# Patient Record
Sex: FEMALE | Race: WHITE | NOT HISPANIC OR LATINO | Employment: OTHER | URBAN - METROPOLITAN AREA
[De-identification: names, ages, dates, MRNs, and addresses within clinical notes are randomized per-mention and may not be internally consistent; named-entity substitution may affect disease eponyms.]

---

## 2017-01-24 ENCOUNTER — HOSPITAL ENCOUNTER (OUTPATIENT)
Dept: CT IMAGING | Facility: CLINIC | Age: 62
Discharge: HOME | End: 2017-01-24
Attending: NURSE PRACTITIONER | Admitting: NURSE PRACTITIONER
Payer: COMMERCIAL

## 2017-01-24 ENCOUNTER — CHARTING TRANS (OUTPATIENT)
Dept: OTHER | Age: 62
End: 2017-01-24

## 2017-01-24 ENCOUNTER — OFFICE VISIT (OUTPATIENT)
Dept: FAMILY MEDICINE | Facility: CLINIC | Age: 62
End: 2017-01-24
Payer: COMMERCIAL

## 2017-01-24 VITALS
SYSTOLIC BLOOD PRESSURE: 126 MMHG | HEART RATE: 80 BPM | TEMPERATURE: 98.7 F | DIASTOLIC BLOOD PRESSURE: 80 MMHG | WEIGHT: 286 LBS | BODY MASS INDEX: 38.78 KG/M2

## 2017-01-24 DIAGNOSIS — R10.84 ABDOMINAL PAIN, GENERALIZED: ICD-10-CM

## 2017-01-24 DIAGNOSIS — K57.32 DIVERTICULITIS OF COLON: Primary | ICD-10-CM

## 2017-01-24 LAB
ALBUMIN SERPL-MCNC: 3.7 G/DL (ref 3.4–5)
ALBUMIN UR-MCNC: ABNORMAL MG/DL
ALP SERPL-CCNC: 89 U/L (ref 40–150)
ALT SERPL W P-5'-P-CCNC: 25 U/L (ref 0–50)
ANION GAP SERPL CALCULATED.3IONS-SCNC: 8 MMOL/L (ref 3–14)
APPEARANCE UR: CLEAR
AST SERPL W P-5'-P-CCNC: 16 U/L (ref 0–45)
BACTERIA #/AREA URNS HPF: ABNORMAL /HPF
BILIRUB SERPL-MCNC: 0.4 MG/DL (ref 0.2–1.3)
BILIRUB UR QL STRIP: NEGATIVE
BUN SERPL-MCNC: 11 MG/DL (ref 7–30)
CALCIUM SERPL-MCNC: 8.9 MG/DL (ref 8.5–10.1)
CHLORIDE SERPL-SCNC: 105 MMOL/L (ref 94–109)
CO2 SERPL-SCNC: 29 MMOL/L (ref 20–32)
COLOR UR AUTO: YELLOW
CREAT SERPL-MCNC: 0.86 MG/DL (ref 0.52–1.04)
CRP SERPL-MCNC: 70.2 MG/L (ref 0–8)
ERYTHROCYTE [DISTWIDTH] IN BLOOD BY AUTOMATED COUNT: 13.4 % (ref 10–15)
GFR SERPL CREATININE-BSD FRML MDRD: 67 ML/MIN/1.7M2
GLUCOSE SERPL-MCNC: 91 MG/DL (ref 70–99)
GLUCOSE UR STRIP-MCNC: NEGATIVE MG/DL
HCT VFR BLD AUTO: 42.2 % (ref 35–47)
HGB BLD-MCNC: 14.1 G/DL (ref 11.7–15.7)
HGB UR QL STRIP: ABNORMAL
KETONES UR STRIP-MCNC: NEGATIVE MG/DL
LEUKOCYTE ESTERASE UR QL STRIP: ABNORMAL
MCH RBC QN AUTO: 30.5 PG (ref 26.5–33)
MCHC RBC AUTO-ENTMCNC: 33.4 G/DL (ref 31.5–36.5)
MCV RBC AUTO: 91 FL (ref 78–100)
NITRATE UR QL: NEGATIVE
NON-SQ EPI CELLS #/AREA URNS LPF: ABNORMAL /LPF
PH UR STRIP: 6.5 PH (ref 5–7)
PLATELET # BLD AUTO: 296 10E9/L (ref 150–450)
POTASSIUM SERPL-SCNC: 4.2 MMOL/L (ref 3.4–5.3)
PROT SERPL-MCNC: 8.2 G/DL (ref 6.8–8.8)
RBC # BLD AUTO: 4.63 10E12/L (ref 3.8–5.2)
RBC #/AREA URNS AUTO: ABNORMAL /HPF (ref 0–2)
SODIUM SERPL-SCNC: 142 MMOL/L (ref 133–144)
SP GR UR STRIP: 1.02 (ref 1–1.03)
URN SPEC COLLECT METH UR: ABNORMAL
UROBILINOGEN UR STRIP-ACNC: 0.2 EU/DL (ref 0.2–1)
WBC # BLD AUTO: 9.2 10E9/L (ref 4–11)
WBC #/AREA URNS AUTO: ABNORMAL /HPF (ref 0–2)

## 2017-01-24 PROCEDURE — 86140 C-REACTIVE PROTEIN: CPT | Performed by: NURSE PRACTITIONER

## 2017-01-24 PROCEDURE — 80053 COMPREHEN METABOLIC PANEL: CPT | Performed by: NURSE PRACTITIONER

## 2017-01-24 PROCEDURE — 99214 OFFICE O/P EST MOD 30 MIN: CPT | Performed by: NURSE PRACTITIONER

## 2017-01-24 PROCEDURE — 81001 URINALYSIS AUTO W/SCOPE: CPT | Performed by: NURSE PRACTITIONER

## 2017-01-24 PROCEDURE — 85027 COMPLETE CBC AUTOMATED: CPT | Performed by: NURSE PRACTITIONER

## 2017-01-24 PROCEDURE — 36415 COLL VENOUS BLD VENIPUNCTURE: CPT | Performed by: NURSE PRACTITIONER

## 2017-01-24 PROCEDURE — 25000125 ZZHC RX 250: Performed by: NURSE PRACTITIONER

## 2017-01-24 PROCEDURE — 74177 CT ABD & PELVIS W/CONTRAST: CPT

## 2017-01-24 PROCEDURE — 25500064 ZZH RX 255 OP 636: Performed by: NURSE PRACTITIONER

## 2017-01-24 RX ORDER — CIPROFLOXACIN 500 MG/1
500 TABLET, FILM COATED ORAL 2 TIMES DAILY
Qty: 28 TABLET | Refills: 0 | Status: SHIPPED | OUTPATIENT
Start: 2017-01-24 | End: 2017-11-13

## 2017-01-24 RX ORDER — METRONIDAZOLE 500 MG/1
500 TABLET ORAL 4 TIMES DAILY
Qty: 56 TABLET | Refills: 0 | Status: SHIPPED | OUTPATIENT
Start: 2017-01-24 | End: 2017-02-07

## 2017-01-24 RX ORDER — IOPAMIDOL 755 MG/ML
100 INJECTION, SOLUTION INTRAVASCULAR ONCE
Status: COMPLETED | OUTPATIENT
Start: 2017-01-24 | End: 2017-01-24

## 2017-01-24 RX ADMIN — SODIUM CHLORIDE 60 ML: 9 INJECTION, SOLUTION INTRAVENOUS at 15:24

## 2017-01-24 RX ADMIN — IOPAMIDOL 99 ML: 755 INJECTION, SOLUTION INTRAVENOUS at 15:24

## 2017-01-24 NOTE — NURSING NOTE
Chief Complaint   Patient presents with     Abdominal Pain     pelvic pain       Initial /80 mmHg  Pulse 80  Temp(Src) 98.7  F (37.1  C) (Tympanic)  Wt 286 lb (129.729 kg)  LMP 01/19/2005 Estimated body mass index is 38.78 kg/(m^2) as calculated from the following:    Height as of 4/6/16: 6' (1.829 m).    Weight as of this encounter: 286 lb (129.729 kg).  BP completed using cuff size: large

## 2017-01-24 NOTE — PROGRESS NOTES
SUBJECTIVE:                                                    Ashli Rogers is a 61 year old female who presents to clinic today for the following health issues:      ABDOMINAL PAIN     Onset: 3 days     Description:   Character: Cramping  Location: suprapubic region  Radiation: None    Intensity: moderate    Progression of Symptoms:  same    Accompanying Signs & Symptoms:  Fever/Chills?: no   Gas/Bloating: no   Nausea: no   Vomitting: no   Diarrhea?: no   Constipation:no   Dysuria or Hematuria: no    History:   Trauma: no   Previous similar pain: no    Previous tests done: none    Precipitating factors:   Does the pain change with:     Food: no      BM: no     Urination: no     Alleviating factors:  Heating pad    Therapies Tried and outcome: pad    LMP:  not applicable       Ashli is a 61-year-old female who seen today with a 4 day history of lower abdominal pain. She describes this as a cramping pain. It is not affected by eating or by defecation.Stools are a little bit loose, but no actual diarrhea. Denies passing blood or mucus in her stools.  She denies nausea, denies fever. She has a history of gluten intolerance, and on Saturday she went to the [facility], did eat a little gluten, and so when she began having pain that evening she thought it was related to that. However, the pain has persisted. She denies urinary tract symptoms other thenher chronic issues with urinary urge and stress incontinence. She denies vaginal discharge.    She does have a cold, has some laryngitis and nasal congestion.    Problem list and histories reviewed & adjusted, as indicated.  Additional history: as documented    BP Readings from Last 3 Encounters:   01/24/17 126/80   07/05/16 124/76   05/11/16 112/70    Wt Readings from Last 3 Encounters:   01/24/17 286 lb (129.729 kg)   07/05/16 298 lb (135.172 kg)   04/06/16 292 lb (132.45 kg)                    ROS:  Constitutional, HEENT, cardiovascular, pulmonary, gi and  gu systems are negative, except as otherwise noted.    OBJECTIVE:                                                    /80 mmHg  Pulse 80  Temp(Src) 98.7  F (37.1  C) (Tympanic)  Wt 286 lb (129.729 kg)  LMP 01/19/2005  Body mass index is 38.78 kg/(m^2).  GENERAL: Well-nourished, well-hydrated. Appears moderately uncomfortable, no acute distress.  EYES: Eyes grossly normal to inspection, PERRL and conjunctivae and sclerae normal  HENT: ear canals and TM's normal, nose and mouth without ulcers or lesions  NECK: no adenopathy, no asymmetry, masses, or scars and thyroid normal to palpation  RESP: lungs clear to auscultation - no rales, rhonchi or wheezes  CV: regular rate and rhythm, normal S1 S2, no S3 or S4, no murmur, click or rub, no peripheral edema and peripheral pulses strong  ABDOMEN: Soft, nondistended. Bowel sounds are present in all quadrants. No masses, no organomegaly. She has diffuse tenderness across the lower abdomen palpation. No guarding or rebound tenderness  SKIN: no suspicious lesions or rashes    Diagnostic Test Results:  Results for orders placed or performed in visit on 01/24/17 (from the past 24 hour(s))   CBC with platelets   Result Value Ref Range    WBC 9.2 4.0 - 11.0 10e9/L    RBC Count 4.63 3.8 - 5.2 10e12/L    Hemoglobin 14.1 11.7 - 15.7 g/dL    Hematocrit 42.2 35.0 - 47.0 %    MCV 91 78 - 100 fl    MCH 30.5 26.5 - 33.0 pg    MCHC 33.4 31.5 - 36.5 g/dL    RDW 13.4 10.0 - 15.0 %    Platelet Count 296 150 - 450 10e9/L   Comprehensive metabolic panel   Result Value Ref Range    Sodium 142 133 - 144 mmol/L    Potassium 4.2 3.4 - 5.3 mmol/L    Chloride 105 94 - 109 mmol/L    Carbon Dioxide 29 20 - 32 mmol/L    Anion Gap 8 3 - 14 mmol/L    Glucose 91 70 - 99 mg/dL    Urea Nitrogen 11 7 - 30 mg/dL    Creatinine 0.86 0.52 - 1.04 mg/dL    GFR Estimate 67 >60 mL/min/1.7m2    GFR Estimate If Black 81 >60 mL/min/1.7m2    Calcium 8.9 8.5 - 10.1 mg/dL    Bilirubin Total 0.4 0.2 - 1.3 mg/dL     Albumin 3.7 3.4 - 5.0 g/dL    Protein Total 8.2 6.8 - 8.8 g/dL    Alkaline Phosphatase 89 40 - 150 U/L    ALT 25 0 - 50 U/L    AST 16 0 - 45 U/L   CRP, inflammation   Result Value Ref Range    CRP Inflammation 70.2 (H) 0.0 - 8.0 mg/L   *UA reflex to Microscopic and Culture ([facility] (except [place] and [place])   Result Value Ref Range    Color Urine Yellow     Appearance Urine Clear     Glucose Urine Negative NEG mg/dL    Bilirubin Urine Negative NEG    Ketones Urine Negative NEG mg/dL    Specific Gravity Urine 1.020 1.003 - 1.035    Blood Urine Small (A) NEG    pH Urine 6.5 5.0 - 7.0 pH    Protein Albumin Urine Trace (A) NEG mg/dL    Urobilinogen Urine 0.2 0.2 - 1.0 EU/dL    Nitrite Urine Negative NEG    Leukocyte Esterase Urine Trace (A) NEG    Source Unspecified Urine    Urine Microscopic   Result Value Ref Range    WBC Urine 2-5 (A) 0 - 2 /HPF    RBC Urine 2-5 (A) 0 - 2 /HPF    Squamous Epithelial /LPF Urine Moderate (A) FEW /LPF    Bacteria Urine Few (A) NEG /HPF        CT ABDOMEN AND PELVIS WITH CONTRAST  1/24/2017 3:54 PM     HISTORY: Generalized abdominal pain and cramping.     TECHNIQUE: Helical axial scans from dome of liver through pubic  symphysis with 99 mL Isovue 370 IV contrast. Radiation dose for this  scan was reduced using automated exposure control, adjustment of the  mA and/or kV according to patient size, or iterative reconstruction  technique.     COMPARISON: None.     FINDINGS: There are four small lucencies in the liver measuring 1 cm  or less. One of these in the posterior right lobe is a benign cyst.  Another in the lateral segment of the left lobe is almost certainly a  benign cyst. Two other very small lucencies at the inferior tip of the  right lobe of the liver are too small to characterize but most likely  also represent benign cysts. The liver is otherwise unremarkable. The  spleen, pancreas, adrenal glands bilaterally and left kidney show  no  significant abnormalities. There is a 5 cm benign cyst in the upper  pole of the right kidney and a probable 1.5 x 1 cm peripelvic cyst at  the midpole of the right kidney. Minimal vascular calcifications are  seen. There is a small to moderate-sized hiatal hernia.     There is abnormal induration of the pericolonic fat in the hepatic  flexure region. There are multiple diverticula scattered throughout  the colon, and the acute inflammatory changes are most likely related  to acute diverticulitis. No evidence for abscess or perforation. The  remainder of the bowel and mesentery in the upper abdomen appear  normal.     Scans through the pelvis show extensive diverticulosis in the distal  descending and sigmoid colon without acute diverticulitis. There is a  retrocecal appendix without appendicitis. No free fluid. The lower  pelvis is partially obscured by metallic artifact from a right hip  arthroplasty.                                                                       IMPRESSION:  1. Diffuse colonic diverticulosis with acute diverticulitis at the  hepatic flexure. No evidence for abscess or perforation.  2. Multiple small liver lucencies most consistent with benign cysts,  but a few are too small to characterize.  3. Benign cysts in the right kidney.           ASSESSMENT/PLAN:                                                      Problem List Items Addressed This Visit     None      Visit Diagnoses     Abdominal pain, generalized    -  Primary     Relevant Orders     CBC with platelets (Completed)     Comprehensive metabolic panel (Completed)     CRP, inflammation (Completed)     *UA reflex to Microscopic and Culture ([facility] (except [place] and [place])     CT Abdomen Pelvis w Contrast            Results reviewed with the patient  Cipro 500 mg b.i.d. For 14 days  Metronidazole 500 mg q.i.d. For 14 days  Clear liquids for 2 days, then gradually increase to a low residue  diet  Minimal activity for the next 3-4 days  Follow up in clinic for recheck in one week, sooner if running fever or abdominal pain worsens        ADRIENNE Loya CNP  [facility]

## 2017-02-01 ENCOUNTER — OFFICE VISIT (OUTPATIENT)
Dept: FAMILY MEDICINE | Facility: CLINIC | Age: 62
End: 2017-02-01
Payer: COMMERCIAL

## 2017-02-01 VITALS
OXYGEN SATURATION: 99 % | TEMPERATURE: 96 F | BODY MASS INDEX: 37.93 KG/M2 | DIASTOLIC BLOOD PRESSURE: 80 MMHG | RESPIRATION RATE: 18 BRPM | SYSTOLIC BLOOD PRESSURE: 118 MMHG | HEART RATE: 115 BPM | WEIGHT: 280 LBS | HEIGHT: 72 IN

## 2017-02-01 DIAGNOSIS — K57.32 DIVERTICULITIS OF COLON: Primary | ICD-10-CM

## 2017-02-01 PROCEDURE — 99213 OFFICE O/P EST LOW 20 MIN: CPT | Performed by: NURSE PRACTITIONER

## 2017-02-01 ASSESSMENT — PAIN SCALES - GENERAL: PAINLEVEL: NO PAIN (0)

## 2017-02-01 NOTE — NURSING NOTE
Chief Complaint   Patient presents with     Abdominal Pain       Initial LMP 01/19/2005 Estimated body mass index is 38.78 kg/(m^2) as calculated from the following:    Height as of 4/6/16: 6' (1.829 m).    Weight as of 1/24/17: 286 lb (129.729 kg).  BP completed using cuff size: annie Corado MA

## 2017-02-01 NOTE — PROGRESS NOTES
SUBJECTIVE:                                                    Ashli Rogers is a 61 year old female who presents to clinic today for the following health issues:       Recheck abdominal pain     Ashli was recently seen in clinic, was diagnosed with diverticulitis of the colon on CT. She is currently taking Cipro and metronidazole. She's had diarrhea with the medications and the diverticulitis, but for the most part is tolerating this quite well. She is no longer having any abdominal pain. She denies passing any blood or mucus in the stools. She is tolerating oral intake, would like to advance her diet.    Problem list and histories reviewed & adjusted, as indicated.  Additional history: as documented    BP Readings from Last 3 Encounters:   02/01/17 118/80   01/24/17 126/80   07/05/16 124/76    Wt Readings from Last 3 Encounters:   02/01/17 280 lb (127.007 kg)   01/24/17 286 lb (129.729 kg)   07/05/16 298 lb (135.172 kg)                  Labs reviewed in EPIC    ROS:  Constitutional, HEENT, cardiovascular, pulmonary, gi and gu systems are negative, except as otherwise noted.    OBJECTIVE:                                                    /80 mmHg  Pulse 115  Temp(Src) 96  F (35.6  C) (Tympanic)  Resp 18  Ht 6' (1.829 m)  Wt 280 lb (127.007 kg)  BMI 37.97 kg/m2  SpO2 99%  LMP 01/19/2005  Body mass index is 37.97 kg/(m^2).  GENERAL: healthy, alert and no distress  RESP: lungs clear to auscultation - no rales, rhonchi or wheezes  CV: regular rates and rhythm, normal S1 S2, no S3 or S4 and no murmur, click or rub  ABDOMEN: Soft, nondistended. Bowel sounds normal in all quadrants. Mild diffuse tenderness, no guarding or rebound tenderness.         ASSESSMENT/PLAN:                                                      Problem List Items Addressed This Visit        Medium    Diverticulitis of colon - Primary         Diverticulitis resolving.  Continue antibiotics as ordered  May increase diet gradually. When  this has completely resolved, she may follow a normal diet. Discussed the fact that our recommended any longer for patients with diverticulosis.  Patient had a normal colonoscopy one year ago.    ADRIENNE Loya CNP  [facility]

## 2017-02-01 NOTE — MR AVS SNAPSHOT
After Visit Summary   2/1/2017    Ashli Rogers    MRN: [id number]           Patient Information     Date Of Birth          1955        Visit Information        Provider Department      2/1/2017 3:15 PM Lakisha Wynne APRN CNP [facility]         Follow-ups after your visit        Who to contact     If you have questions or need follow up information about today's clinic visit or your schedule please contact [facility] directly at [phone].  Normal or non-critical lab and imaging results will be communicated to you by [email or web address], letter or phone within 4 business days after the clinic has received the results. If you do not hear from us within 7 days, please contact the clinic through [employer] or phone. If you have a critical or abnormal lab result, we will notify you by phone as soon as possible.  Submit refill requests through Last Size or call your pharmacy and they will forward the refill request to us. Please allow 3 business days for your refill to be completed.          Additional Information About Your Visit        MyChart Information     Last [employer] gives you secure access to your electronic health record. If you see a primary care provider, you can also send messages to your care team and make appointments. If you have questions, please call your primary care clinic.  If you do not have a primary care provider, please call [phone] and they will assist you.        Care EveryWhere ID     This is your Care EveryWhere ID. This could be used by other organizations to access your [facility] medical records  [id number]        Your Vitals Were     Pulse Temperature Respirations Height BMI (Body Mass Index) Pulse Oximetry    115 96  F (35.6  C) (Tympanic) 18 6' (1.829 m) 37.97 kg/m2 99%    Last Period                   01/19/2005            Blood Pressure from Last 3 Encounters:   02/01/17 118/80   01/24/17 126/80   07/05/16 124/76    Weight from Last 3  Encounters:   02/01/17 280 lb (127.007 kg)   01/24/17 286 lb (129.729 kg)   07/05/16 298 lb (135.172 kg)              Today, you had the following     No orders found for display       Primary Care Provider Office Phone # Fax #    ADRIENNE Loya [phone]       [facility] [address] [place] [address]        Thank you!     Thank you for choosing [facility]  for your care. Our goal is always to provide you with excellent care. Hearing back from our patients is one way we can continue to improve our services. Please take a few minutes to complete the written survey that you may receive in the mail after your visit with us. Thank you!             Your Updated Medication List - Protect others around you: Learn how to safely use, store and throw away your medicines at www.disposemymeds.org.          This list is accurate as of: 2/1/17  4:28 PM.  Always use your most recent med list.                   Brand Name Dispense Instructions for use    CALCIUM-MAGNESIUM-ZINC PO      Take 1 tablet by mouth daily       ciprofloxacin 500 MG tablet    CIPRO    28 tablet    Take 1 tablet (500 mg) by mouth 2 times daily       meclizine 25 MG tablet    ANTIVERT    30 tablet    Take 1 tablet by mouth every 6 hours as needed.       metroNIDAZOLE 500 MG tablet    FLAGYL    56 tablet    Take 1 tablet (500 mg) by mouth 4 times daily for 14 days       VITAMIN D3 PO      Take by mouth daily       VYVANSE 30 MG capsule   Generic drug:  lisdexamfetamine      Take by mouth every morning       ZYRTEC 10 MG tablet   Generic drug:  cetirizine     30    ONE TABLET DAILY

## 2017-02-03 PROBLEM — K57.32 DIVERTICULITIS OF COLON: Status: ACTIVE | Noted: 2017-02-03

## 2017-02-10 ENCOUNTER — CHARTING TRANS (OUTPATIENT)
Dept: OTHER | Age: 62
End: 2017-02-10

## 2017-03-01 ENCOUNTER — MYAURORA ACCOUNT LINK (OUTPATIENT)
Dept: OTHER | Age: 62
End: 2017-03-01

## 2017-03-01 ENCOUNTER — CHARTING TRANS (OUTPATIENT)
Dept: OBGYN | Age: 62
End: 2017-03-01

## 2017-03-22 ENCOUNTER — CHARTING TRANS (OUTPATIENT)
Dept: OTHER | Age: 62
End: 2017-03-22

## 2017-03-28 ENCOUNTER — OFFICE VISIT (OUTPATIENT)
Dept: URGENT CARE | Facility: RETAIL CLINIC | Age: 62
End: 2017-03-28
Payer: COMMERCIAL

## 2017-03-28 VITALS
OXYGEN SATURATION: 99 % | HEART RATE: 70 BPM | DIASTOLIC BLOOD PRESSURE: 89 MMHG | SYSTOLIC BLOOD PRESSURE: 121 MMHG | TEMPERATURE: 98.2 F

## 2017-03-28 DIAGNOSIS — H10.31 ACUTE CONJUNCTIVITIS OF RIGHT EYE, UNSPECIFIED ACUTE CONJUNCTIVITIS TYPE: ICD-10-CM

## 2017-03-28 DIAGNOSIS — J02.9 ACUTE PHARYNGITIS, UNSPECIFIED ETIOLOGY: Primary | ICD-10-CM

## 2017-03-28 LAB — S PYO AG THROAT QL IA.RAPID: NORMAL

## 2017-03-28 PROCEDURE — 87081 CULTURE SCREEN ONLY: CPT | Performed by: NURSE PRACTITIONER

## 2017-03-28 PROCEDURE — 99213 OFFICE O/P EST LOW 20 MIN: CPT | Performed by: NURSE PRACTITIONER

## 2017-03-28 PROCEDURE — 87880 STREP A ASSAY W/OPTIC: CPT | Mod: QW | Performed by: NURSE PRACTITIONER

## 2017-03-28 NOTE — NURSING NOTE
Chief Complaint   Patient presents with     Eye Problem     redness and itchy for x 1day     Pharyngitis     x 1 week       Initial /89  Pulse 70  Temp 98.2  F (36.8  C) (Oral)  LMP 01/19/2005  SpO2 99% Estimated body mass index is 37.97 kg/(m^2) as calculated from the following:    Height as of 2/1/17: 6' (1.829 m).    Weight as of 2/1/17: 280 lb (127 kg).  Medication Reconciliation: complete   Leeanna Art, CMA

## 2017-03-28 NOTE — PROGRESS NOTES
[facility] clinic note    SUBJECTIVE:  Ashli Rogers is a 62 year old female who presents to [facility] clinic with chief complaint of sore throat.    Onset of symptoms was 1 week(s) ago.    Course of illness: gradual onset, worsening and the eye sudden today.    Severity mild and moderate  Course of illness:  Current and Associated symptoms: nasal congestion, rhinorrhea, cough , sore throat, facial pain/pressure, hoarse voice and headache  Treatment measures tried at home include OTC meds.  Predisposing factors include Teacher.    Current Outpatient Prescriptions   Medication     neomycin-polymixin-dexamethasone (MAXITROL) ophthalmic ointment     Cholecalciferol (VITAMIN D3 PO)     lisdexamfetamine (VYVANSE) 30 MG capsule     ZYRTEC 10 MG OR TABS     ciprofloxacin (CIPRO) 500 MG tablet     CALCIUM-MAGNESIUM-ZINC PO     meclizine (ANTIVERT) 25 MG tablet     No current facility-administered medications for this visit.      PAST MEDICAL HISTORY:   Past Medical History:   Diagnosis Date     Closed fracture of medial malleolus     Distal evulsion fracture of the medial malleoli     Diverticulitis of colon 2/3/2017     DJD (degenerative joint disease) 5/24/2011     Erythema nodosum      Generalized osteoarthrosis, unspecified site     Hips     S/P total hip arthroplasty 4/4/2003       PAST SURGICAL HISTORY:   Past Surgical History:   Procedure Laterality Date     C TOTAL HIP ARTHROPLASTY  4/26/04    Hip Replacement, Total     COLONOSCOPY N/A 5/11/2016    Procedure: COLONOSCOPY;  Surgeon: Ervin Johnston MD;  Location:  GI     ESOPHAGOSCOPY, GASTROSCOPY, DUODENOSCOPY (EGD), COMBINED N/A 5/11/2016    Procedure: COMBINED ESOPHAGOSCOPY, GASTROSCOPY, DUODENOSCOPY (EGD), BIOPSY SINGLE OR MULTIPLE;  Surgeon: Ervin Johnston MD;  Location:  GI     HC COLONOSCOPY W/WO BRUSH/WASH  1/11/2006       FAMILY HISTORY:   Family History   Problem Relation Age of Onset     CANCER  Father       of lung cancer     HEART DISEASE Daughter      2 holes in her heart       SOCIAL HISTORY:   Social History   Substance Use Topics     Smoking status: Never Smoker     Smokeless tobacco: Never Used     Alcohol use Yes      Comment: 2 drinks per month       ROS:  Review of systems negative except as stated above.    OBJECTIVE:   Vitals:    17 1741   BP: 121/89   Pulse: 70   Temp: 98.2  F (36.8  C)   TempSrc: Oral   SpO2: 99%     GENERAL APPEARANCE: alert, mild distress and cooperative  EYES: EOMI,  PERRL, conjunctiva clear  HENT: ear canals and TM's normal.  Nose normal.  Pharynx erythematous noted.  NECK: bilateral anterior cervical adenopathy  RESP: lungs clear to auscultation - no rales, rhonchi or wheezes  CV: regular rates and rhythm, normal S1 S2, no murmur noted  ABDOMEN:  soft, nontender, no HSM or masses and bowel sounds normal  SKIN: no suspicious lesions or rashes    Rapid Strep test is negative; await throat culture results.    ASSESSMENT:     Acute pharyngitis, unspecified etiology  Acute conjunctivitis of right eye, unspecified acute conjunctivitis type      PLAN:   Outpatient Encounter Prescriptions as of 3/28/2017   Medication Sig Dispense Refill     neomycin-polymixin-dexamethasone (MAXITROL) ophthalmic ointment Instil 1/2 inch ribbon of ointment into affected eye every 4 hours for 7 to 10 days. 3.5 g 0     Cholecalciferol (VITAMIN D3 PO) Take by mouth daily       lisdexamfetamine (VYVANSE) 30 MG capsule Take by mouth every morning       ZYRTEC 10 MG OR TABS ONE TABLET DAILY 30 8     ciprofloxacin (CIPRO) 500 MG tablet Take 1 tablet (500 mg) by mouth 2 times daily (Patient not taking: Reported on 3/28/2017) 28 tablet 0     CALCIUM-MAGNESIUM-ZINC PO Take 1 tablet by mouth daily Reported on 3/28/2017       meclizine (ANTIVERT) 25 MG tablet Take 1 tablet by mouth every 6 hours as needed. (Patient not taking: Reported on 3/28/2017) 30 tablet 0     No facility-administered encounter  medications on file as of 3/28/2017.      If not improving Follow up at:  [facility] [phone]  Encourage good hydration (mainly water), may drink tea /c honey, warm chicken broth to sooth throat.  Soft foods may be preferred for several days.  Symptomatic treatment with warm Na+ H2O gargles, OTC analgesic, etc. discussed.   Strep culture pending.   Ashli Rogers told positive cultures called only.  Rest as needed.  Follow-up with primary care provider if not improving.    If difficulty breathing or swallowing be seen immediately in the ED.    Nabil Bradford MSN, APRN, Family NP-C  [facility]

## 2017-03-28 NOTE — MR AVS SNAPSHOT
After Visit Summary   3/28/2017    Ashli Rogers    MRN: [id number]           Patient Information     Date Of Birth          1955        Visit Information        Provider Department      3/28/2017 4:50 PM Nabil Bradford APRN [facility]        Today's Diagnoses     Acute pharyngitis, unspecified etiology    -  1    Acute conjunctivitis of right eye, unspecified acute conjunctivitis type           Follow-ups after your visit        Who to contact     You can reach your care team any time of the day by calling [phone].  Notification of test results:  If you have an abnormal lab result, we will notify you by phone as soon as possible.         Additional Information About Your Visit        MyChart Information     [facility] gives you secure access to your electronic health record. If you see a primary care provider, you can also send messages to your care team and make appointments. If you have questions, please call your primary care clinic.  If you do not have a primary care provider, please call [phone] and they will assist you.        Care EveryWhere ID     This is your Care EveryWhere ID. This could be used by other organizations to access your [place] medical records  [id number]        Your Vitals Were     Pulse Temperature Last Period Pulse Oximetry          70 98.2  F (36.8  C) (Oral) 01/19/2005 99%         Blood Pressure from Last 3 Encounters:   03/28/17 121/89   02/01/17 118/80   01/24/17 126/80    Weight from Last 3 Encounters:   02/01/17 280 lb (127 kg)   01/24/17 286 lb (129.7 kg)   07/05/16 298 lb (135.2 kg)              We Performed the Following     BETA STREP GROUP A R/O CULTURE     RAPID STREP SCREEN          Today's Medication Changes          These changes are accurate as of: 3/28/17  6:14 PM.  If you have any questions, ask your nurse or doctor.               Start taking these medicines.        Dose/Directions     neomycin-polymixin-dexamethasone ophthalmic ointment   Commonly known as:  MAXITROL   Used for:  Acute conjunctivitis of right eye, unspecified acute conjunctivitis type   Started by:  Nabil Bradford, ADRIENNE CNP        Instil 1/2 inch ribbon of ointment into affected eye every 4 hours for 7 to 10 days.   Quantity:  3.5 g   Refills:  0            Where to get your medicines      These medications were sent to [address]  [address] [facility] [address]     Phone:  [phone]     neomycin-polymixin-dexamethasone ophthalmic ointment                Primary Care Provider Office Phone # Fax #    Lakisha ADRIENNE Franklin [phone]       [facility] [address]   [facility] [place] [address]        Thank you!     Thank you for choosing [facility]  for your care. Our goal is always to provide you with excellent care. Hearing back from our patients is one way we can continue to improve our services. Please take a few minutes to complete the written survey that you may receive in the mail after your visit with us. Thank you!             Your Updated Medication List - Protect others around you: Learn how to safely use, store and throw away your medicines at www.disposemymeds.org.          This list is accurate as of: 3/28/17  6:14 PM.  Always use your most recent med list.                   Brand Name Dispense Instructions for use    CALCIUM-MAGNESIUM-ZINC PO      Take 1 tablet by mouth daily Reported on 3/28/2017       ciprofloxacin 500 MG tablet    CIPRO    28 tablet    Take 1 tablet (500 mg) by mouth 2 times daily       meclizine 25 MG tablet    ANTIVERT    30 tablet    Take 1 tablet by mouth every 6 hours as needed.       neomycin-polymixin-dexamethasone ophthalmic ointment    MAXITROL    3.5 g    Instil 1/2 inch ribbon of ointment into affected eye every 4 hours for 7 to 10 days.       VITAMIN D3 PO      Take by mouth daily        VYVANSE 30 MG capsule   Generic drug:  lisdexamfetamine      Take by mouth every morning       ZYRTEC 10 MG tablet   Generic drug:  cetirizine     30    ONE TABLET DAILY

## 2017-03-29 ENCOUNTER — CHARTING TRANS (OUTPATIENT)
Dept: OTHER | Age: 62
End: 2017-03-29

## 2017-03-31 ENCOUNTER — CHARTING TRANS (OUTPATIENT)
Dept: FAMILY MEDICINE | Age: 62
End: 2017-03-31

## 2017-03-31 LAB — BETA STREP CONFIRM: NORMAL

## 2017-04-20 ENCOUNTER — CHARTING TRANS (OUTPATIENT)
Dept: OTHER | Age: 62
End: 2017-04-20

## 2017-04-24 ENCOUNTER — CHARTING TRANS (OUTPATIENT)
Dept: OTHER | Age: 62
End: 2017-04-24

## 2017-04-25 ENCOUNTER — CHARTING TRANS (OUTPATIENT)
Dept: OTHER | Age: 62
End: 2017-04-25

## 2017-05-05 ENCOUNTER — CHARTING TRANS (OUTPATIENT)
Dept: FAMILY MEDICINE | Age: 62
End: 2017-05-05

## 2017-05-09 ENCOUNTER — LAB SERVICES (OUTPATIENT)
Dept: OTHER | Age: 62
End: 2017-05-09

## 2017-05-09 ENCOUNTER — CHARTING TRANS (OUTPATIENT)
Dept: OTHER | Age: 62
End: 2017-05-09

## 2017-05-09 LAB
ALBUMIN SERPL BCG-MCNC: 3.5 G/DL (ref 3.6–5.1)
ALBUMIN/CREAT UR: 5.8 MG/G (ref 0–30)
ALP SERPL-CCNC: 82 U/L (ref 45–105)
ALT SERPL W/O P-5'-P-CCNC: 47 U/L (ref 15–43)
AST SERPL-CCNC: 36 U/L (ref 14–43)
BASOPHIL %: 0.2 % (ref 0–1.2)
BASOPHIL ABSOLUTE #: 0 10*3/UL (ref 0–0.1)
BILIRUB SERPL-MCNC: 1.4 MG/DL (ref 0–1.3)
BUN SERPL-MCNC: 24 MG/DL (ref 7–20)
CALCIUM SERPL-MCNC: 9.5 MG/DL (ref 8.6–10.6)
CHLORIDE SERPL-SCNC: 103 MMOL/L (ref 96–107)
CHOLEST SERPL-MCNC: 144 MG/DL (ref 140–200)
CREAT UR-MCNC: 122.6 MG/DL
CREATININE, SERUM: 0.7 MG/DL (ref 0.5–1.4)
DIFFERENTIAL TYPE: ABNORMAL
EOSINOPHIL %: 1.2 % (ref 0–10)
EOSINOPHIL ABSOLUTE #: 0.2 10*3/UL (ref 0–0.5)
GFR SERPL CREATININE-BSD FRML MDRD: >60 ML/MIN/{1.73M2}
GFR SERPL CREATININE-BSD FRML MDRD: >60 ML/MIN/{1.73M2}
GLUCOSE P FAST SERPL-MCNC: 97 MG/DL (ref 60–100)
HCO3 SERPL-SCNC: 29 MMOL/L (ref 22–32)
HDLC SERPL-MCNC: 53 MG/DL
HEMATOCRIT: 40.1 % (ref 34–45)
HEMOGLOBIN: 13 G/DL (ref 11.2–15.7)
LDLC SERPL CALC-MCNC: 81 MG/DL (ref 30–100)
LYMPH PERCENT: 13.2 % (ref 20.5–51.1)
LYMPHOCYTE ABSOLUTE #: 1.7 10*3/UL (ref 1.2–3.4)
MEAN CORPUSCULAR HGB CONCENTRATION: 32.4 % (ref 32–36)
MEAN CORPUSCULAR HGB: 30.4 PG (ref 27–34)
MEAN CORPUSCULAR VOLUME: 93.7 FL (ref 79–95)
MEAN PLATELET VOLUME: 11.7 FL (ref 8.6–12.4)
MICROALBUMIN UR-MCNC: 7.1 MG/L
MONOCYTE ABSOLUTE #: 1 10*3/UL (ref 0.2–0.9)
MONOCYTE PERCENT: 7.9 % (ref 4.3–12.9)
NEUTROPHIL ABSOLUTE #: 9.7 10*3/UL (ref 1.4–6.5)
NEUTROPHIL PERCENT: 77.5 % (ref 34–73.5)
PLATELET COUNT: 237 10*3/UL (ref 150–400)
POTASSIUM SERPL-SCNC: 4.3 MMOL/L (ref 3.5–5.3)
PROT SERPL-MCNC: 6.4 G/DL (ref 6.4–8.5)
RED BLOOD CELL COUNT: 4.28 10*6/UL (ref 3.7–5.2)
RED CELL DISTRIBUTION WIDTH: 14.1 % (ref 11.3–14.8)
SODIUM SERPL-SCNC: 141 MMOL/L (ref 136–146)
TRIGL SERPL-MCNC: 51 MG/DL (ref 0–200)
TSH SERPL-ACNC: 0.32 M[IU]/L (ref 0.3–4.82)
WHITE BLOOD CELL COUNT: 12.6 10*3/UL (ref 4–10)

## 2017-06-09 ENCOUNTER — CHARTING TRANS (OUTPATIENT)
Dept: OTHER | Age: 62
End: 2017-06-09

## 2017-06-19 ENCOUNTER — IMAGING SERVICES (OUTPATIENT)
Dept: OTHER | Age: 62
End: 2017-06-19

## 2017-06-26 ENCOUNTER — CHARTING TRANS (OUTPATIENT)
Dept: OTHER | Age: 62
End: 2017-06-26

## 2017-08-22 ENCOUNTER — CHARTING TRANS (OUTPATIENT)
Dept: OTHER | Age: 62
End: 2017-08-22

## 2017-08-28 ENCOUNTER — IMAGING SERVICES (OUTPATIENT)
Dept: OTHER | Age: 62
End: 2017-08-28

## 2017-08-28 ENCOUNTER — CHARTING TRANS (OUTPATIENT)
Dept: FAMILY MEDICINE | Age: 62
End: 2017-08-28

## 2017-08-28 ENCOUNTER — CHARTING TRANS (OUTPATIENT)
Dept: OTHER | Age: 62
End: 2017-08-28

## 2017-09-01 ENCOUNTER — HEALTH MAINTENANCE LETTER (OUTPATIENT)
Age: 62
End: 2017-09-01

## 2017-09-06 ENCOUNTER — MYAURORA ACCOUNT LINK (OUTPATIENT)
Dept: OTHER | Age: 62
End: 2017-09-06

## 2017-09-06 ENCOUNTER — CHARTING TRANS (OUTPATIENT)
Dept: FAMILY MEDICINE | Age: 62
End: 2017-09-06

## 2017-09-11 ENCOUNTER — CHARTING TRANS (OUTPATIENT)
Dept: OTHER | Age: 62
End: 2017-09-11

## 2017-09-13 ENCOUNTER — CHARTING TRANS (OUTPATIENT)
Dept: OTHER | Age: 62
End: 2017-09-13

## 2017-09-13 ENCOUNTER — IMAGING SERVICES (OUTPATIENT)
Dept: OTHER | Age: 62
End: 2017-09-13

## 2017-10-17 ENCOUNTER — CHARTING TRANS (OUTPATIENT)
Dept: OTHER | Age: 62
End: 2017-10-17

## 2017-10-30 ENCOUNTER — LAB SERVICES (OUTPATIENT)
Dept: OTHER | Age: 62
End: 2017-10-30

## 2017-10-30 LAB
ALBUMIN SERPL BCG-MCNC: 4.1 G/DL (ref 3.6–5.1)
ALP SERPL-CCNC: 81 U/L (ref 45–105)
ALT SERPL W/O P-5'-P-CCNC: 42 U/L (ref 15–43)
AST SERPL-CCNC: 34 U/L (ref 14–43)
BASOPHIL %: 0.7 % (ref 0–1.2)
BASOPHIL ABSOLUTE #: 0 10*3/UL (ref 0–0.1)
BILIRUB SERPL-MCNC: 0.6 MG/DL (ref 0–1.3)
BUN SERPL-MCNC: 20 MG/DL (ref 7–20)
CALCIUM SERPL-MCNC: 9.7 MG/DL (ref 8.6–10.6)
CHLORIDE SERPL-SCNC: 105 MMOL/L (ref 96–107)
CREATININE, SERUM: 0.7 MG/DL (ref 0.5–1.4)
DIFFERENTIAL TYPE: ABNORMAL
EOSINOPHIL %: 2.7 % (ref 0–10)
EOSINOPHIL ABSOLUTE #: 0.2 10*3/UL (ref 0–0.5)
GFR SERPL CREATININE-BSD FRML MDRD: >60 ML/MIN/{1.73M2}
GFR SERPL CREATININE-BSD FRML MDRD: >60 ML/MIN/{1.73M2}
GLUCOSE SERPL-MCNC: 95 MG/DL (ref 70–200)
HCO3 SERPL-SCNC: 29 MMOL/L (ref 22–32)
HEMATOCRIT: 42.1 % (ref 34–45)
HEMOGLOBIN: 13 G/DL (ref 11.2–15.7)
LYMPH PERCENT: 29.4 % (ref 20.5–51.1)
LYMPHOCYTE ABSOLUTE #: 1.7 10*3/UL (ref 1.2–3.4)
MEAN CORPUSCULAR HGB CONCENTRATION: 30.9 % (ref 32–36)
MEAN CORPUSCULAR HGB: 29.4 PG (ref 27–34)
MEAN CORPUSCULAR VOLUME: 95.2 FL (ref 79–95)
MEAN PLATELET VOLUME: 12 FL (ref 8.6–12.4)
MONOCYTE ABSOLUTE #: 0.6 10*3/UL (ref 0.2–0.9)
MONOCYTE PERCENT: 9.8 % (ref 4.3–12.9)
NEUTROPHIL ABSOLUTE #: 3.4 10*3/UL (ref 1.4–6.5)
NEUTROPHIL PERCENT: 57.4 % (ref 34–73.5)
PLATELET COUNT: 242 10*3/UL (ref 150–400)
POTASSIUM SERPL-SCNC: 4.3 MMOL/L (ref 3.5–5.3)
PROT SERPL-MCNC: 7.3 G/DL (ref 6.4–8.5)
RED BLOOD CELL COUNT: 4.42 10*6/UL (ref 3.7–5.2)
RED CELL DISTRIBUTION WIDTH: 14.6 % (ref 11.3–14.8)
SODIUM SERPL-SCNC: 144 MMOL/L (ref 136–146)
WHITE BLOOD CELL COUNT: 5.9 10*3/UL (ref 4–10)

## 2017-11-02 ENCOUNTER — CHARTING TRANS (OUTPATIENT)
Dept: OTHER | Age: 62
End: 2017-11-02

## 2017-11-03 ENCOUNTER — CHARTING TRANS (OUTPATIENT)
Dept: OTHER | Age: 62
End: 2017-11-03

## 2017-11-06 ENCOUNTER — CHARTING TRANS (OUTPATIENT)
Dept: FAMILY MEDICINE | Age: 62
End: 2017-11-06

## 2017-11-06 ENCOUNTER — IMAGING SERVICES (OUTPATIENT)
Dept: OTHER | Age: 62
End: 2017-11-06

## 2017-11-06 ENCOUNTER — CHARTING TRANS (OUTPATIENT)
Dept: OTHER | Age: 62
End: 2017-11-06

## 2017-11-07 ENCOUNTER — CHARTING TRANS (OUTPATIENT)
Dept: OTHER | Age: 62
End: 2017-11-07

## 2017-11-13 ENCOUNTER — RADIANT APPOINTMENT (OUTPATIENT)
Dept: GENERAL RADIOLOGY | Facility: CLINIC | Age: 62
End: 2017-11-13
Attending: PODIATRIST
Payer: COMMERCIAL

## 2017-11-13 ENCOUNTER — OFFICE VISIT (OUTPATIENT)
Dept: PODIATRY | Facility: CLINIC | Age: 62
End: 2017-11-13
Payer: COMMERCIAL

## 2017-11-13 ENCOUNTER — TELEPHONE (OUTPATIENT)
Dept: PODIATRY | Facility: CLINIC | Age: 62
End: 2017-11-13

## 2017-11-13 VITALS — WEIGHT: 289 LBS | TEMPERATURE: 96.5 F | HEIGHT: 72 IN | BODY MASS INDEX: 39.14 KG/M2

## 2017-11-13 DIAGNOSIS — S93.401A SPRAIN OF RIGHT ANKLE, UNSPECIFIED LIGAMENT, INITIAL ENCOUNTER: Primary | ICD-10-CM

## 2017-11-13 DIAGNOSIS — M12.571 TRAUMATIC ARTHRITIS OF ANKLE, RIGHT: ICD-10-CM

## 2017-11-13 DIAGNOSIS — M79.671 RIGHT FOOT PAIN: ICD-10-CM

## 2017-11-13 DIAGNOSIS — M25.571 RIGHT ANKLE PAIN: ICD-10-CM

## 2017-11-13 PROCEDURE — 73600 X-RAY EXAM OF ANKLE: CPT | Mod: TC

## 2017-11-13 PROCEDURE — 99203 OFFICE O/P NEW LOW 30 MIN: CPT | Performed by: PODIATRIST

## 2017-11-13 PROCEDURE — 73630 X-RAY EXAM OF FOOT: CPT | Mod: TC

## 2017-11-13 ASSESSMENT — PAIN SCALES - GENERAL: PAINLEVEL: MODERATE PAIN (5)

## 2017-11-13 NOTE — PROGRESS NOTES
HPI:  Ashli Rogers is a 62 year old female who is seen in consultation at the request of self.    Pt presents for eval of:   (Onset, Location, L/R, Character, Treatments, Injury if yes)    XR Right foot and ankle today, 11/13/2017 11/12/2017, when getting out of hot tub and walking on wet pool deck slipped and twisted Right foot and ankle. Presents today WB with sandals and medial and dorsal Right foot and ankle pain and Right knee pain.  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    Weight management plan: Patient was referred to their PCP to discuss a diet and exercise plan.     ROS:  10 point ROS neg other than the symptoms noted above in the HPI.    PAST MEDICAL HISTORY:   Past Medical History:   Diagnosis Date     Closed fracture of medial malleolus     Distal evulsion fracture of the medial malleoli     Diverticulitis of colon 2/3/2017     DJD (degenerative joint disease) 5/24/2011     Erythema nodosum      Generalized osteoarthrosis, unspecified site     Hips     S/P total hip arthroplasty 4/4/2003        PAST SURGICAL HISTORY:   Past Surgical History:   Procedure Laterality Date     C TOTAL HIP ARTHROPLASTY  4/26/04    Hip Replacement, Total     COLONOSCOPY N/A 5/11/2016    Procedure: COLONOSCOPY;  Surgeon: Ervin Johnston MD;  Location:  GI     ESOPHAGOSCOPY, GASTROSCOPY, DUODENOSCOPY (EGD), COMBINED N/A 5/11/2016    Procedure: COMBINED ESOPHAGOSCOPY, GASTROSCOPY, DUODENOSCOPY (EGD), BIOPSY SINGLE OR MULTIPLE;  Surgeon: Ervin Johnston MD;  Location:  GI     HC COLONOSCOPY W/WO BRUSH/WASH  1/11/2006        MEDICATIONS:   Current Outpatient Prescriptions:      IBUPROFEN PO, , Disp: , Rfl:      CALCIUM-MAGNESIUM-ZINC PO, Take 1 tablet by mouth daily Reported on 3/28/2017, Disp: , Rfl:      Cholecalciferol (VITAMIN D3 PO), Take by mouth daily, Disp: , Rfl:       lisdexamfetamine (VYVANSE) 30 MG capsule, Take by mouth every morning, Disp: , Rfl:      ZYRTEC 10 MG OR TABS, ONE TABLET DAILY, Disp: 30, Rfl: 8     meclizine (ANTIVERT) 25 MG tablet, Take 1 tablet by mouth every 6 hours as needed., Disp: 30 tablet, Rfl: 0     ALLERGIES:    Allergies   Allergen Reactions     Cephalexin Monohydrate      keflex     Gluten Meal GI Disturbance     Warfarin Sodium      coumadin        SOCIAL HISTORY:   Social History     Social History     Marital status:      Spouse name: Osbaldo     Number of children: 2     Years of education: N/A     Occupational History     Not on file.     Social History Main Topics     Smoking status: Never Smoker     Smokeless tobacco: Never Used     Alcohol use Yes      Comment: 2 drinks per month     Drug use: No     Sexual activity: Yes     Partners: Male     Other Topics Concern     Weight Concern Yes     going to lose 100 pounds     Social History Narrative        FAMILY HISTORY:   Family History   Problem Relation Age of Onset     CANCER Father       of lung cancer     HEART DISEASE Daughter      2 holes in her heart        EXAM:Vitals: Temp 96.5  F (35.8  C) (Temporal)  Ht 6' (1.829 m)  Wt 289 lb (131.1 kg)  LMP 2005  BMI 39.2 kg/m2  BMI= Body mass index is 39.2 kg/(m^2).    General appearance: Patient is alert and fully cooperative with history & exam.  No sign of distress is noted during the visit.     Psychiatric: Affect is pleasant & appropriate.  Patient appears motivated to improve health.     Respiratory: Breathing is regular & unlabored while sitting.     HEENT: Hearing is intact to spoken word.  Speech is clear.  No gross evidence of visual impairment that would impact ambulation.     Vascular: DP & PT pulses are intact & regular bilaterally.  No significant edema or varicosities noted.  CFT and skin temperature is normal to both lower extremities.     Neurologic: Lower extremity sensation is intact to light touch.  No  evidence of weakness or contracture in the lower extremities.  No evidence of neuropathy.    Dermatologic: Skin is intact to both lower extremities with adequate texture, turgor and tone about the integument.  No paronychia or evidence of soft tissue infection is noted.     Musculoskeletal: Patient is ambulatory limping and guarding about the right ankle. Tight edema is noted about the medial lateral ankle anterior ankle. Ecchymosis extends from medial calcaneus to anterior ankle to lateral calcaneus. No pain with direct palpation to the Achilles tendon or fifth metatarsal base or proximal fibula.    Radiographs 3 views right foot and ankle demonstrate joint space narrowing about the ankle mortise as well as medial lateral gutters. No obvious displaced fracture or cortical disruption. Hypertrophic bone changes are noted about the distal tib-fib syndesmosis. Irregular bone morphology noted throughout the ankle. This is consistent with chronic arthritis of the ankle.     ASSESSMENT:       ICD-10-CM    1. Sprain of right ankle, unspecified ligament, initial encounter S93.401A XR Ankle Right 2 Views     XR Foot Right G/E 3 Views     IBUPROFEN PO   2. Traumatic arthritis of ankle, right M12.571 XR Ankle Right 2 Views     XR Foot Right G/E 3 Views     IBUPROFEN PO        PLAN:  Reviewed patient's chart in [address].      11/13/2017   Intern for grafts of the foot and ankle.  Its been almost 20 years since last sprain so that likely chronic instability. No feeling of weakness over the last years  She does have a history of a bad sprain many decades ago.  Begin compression until no edema   Dispensed fracture boot  Then stay in boot until follow up in three weeks.     Jordan Jang DPM

## 2017-11-13 NOTE — PATIENT INSTRUCTIONS
Follow-up in 3 weeks  Utilize the fracture boot for weightbearing activities as tolerated.  Compression dressing daily.

## 2017-11-13 NOTE — LETTER
11/13/2017         RE: Ashli Rogers  [address]  [facility] [address]        Dear Colleague,    Thank you for referring your patient, Ashli Rogers, to the [facility]. Please see a copy of my visit note below.    HPI:  Ashli Rogers is a 62 year old female who is seen in consultation at the request of self.    Pt presents for eval of:   (Onset, Location, L/R, Character, Treatments, Injury if yes)    XR Right foot and ankle today, 11/13/2017 11/12/2017, when getting out of hot tub and walking on wet pool deck slipped and twisted Right foot and ankle. Presents today WB with sandals and medial and dorsal Right foot and ankle pain and Right knee pain.  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    Weight management plan: Patient was referred to their PCP to discuss a diet and exercise plan.     ROS:  10 point ROS neg other than the symptoms noted above in the HPI.    PAST MEDICAL HISTORY:   Past Medical History:   Diagnosis Date     Closed fracture of medial malleolus     Distal evulsion fracture of the medial malleoli     Diverticulitis of colon 2/3/2017     DJD (degenerative joint disease) 5/24/2011     Erythema nodosum      Generalized osteoarthrosis, unspecified site     Hips     S/P total hip arthroplasty 4/4/2003        PAST SURGICAL HISTORY:   Past Surgical History:   Procedure Laterality Date     C TOTAL HIP ARTHROPLASTY  4/26/04    Hip Replacement, Total     COLONOSCOPY N/A 5/11/2016    Procedure: COLONOSCOPY;  Surgeon: Ervin Johnston MD;  Location:  GI     ESOPHAGOSCOPY, GASTROSCOPY, DUODENOSCOPY (EGD), COMBINED N/A 5/11/2016    Procedure: COMBINED ESOPHAGOSCOPY, GASTROSCOPY, DUODENOSCOPY (EGD), BIOPSY SINGLE OR MULTIPLE;  Surgeon: Ervin Johnston MD;  Location:  GI     HC COLONOSCOPY W/WO BRUSH/WASH  1/11/2006        MEDICATIONS:   Current Outpatient  Prescriptions:      IBUPROFEN PO, , Disp: , Rfl:      CALCIUM-MAGNESIUM-ZINC PO, Take 1 tablet by mouth daily Reported on 3/28/2017, Disp: , Rfl:      Cholecalciferol (VITAMIN D3 PO), Take by mouth daily, Disp: , Rfl:      lisdexamfetamine (VYVANSE) 30 MG capsule, Take by mouth every morning, Disp: , Rfl:      ZYRTEC 10 MG OR TABS, ONE TABLET DAILY, Disp: 30, Rfl: 8     meclizine (ANTIVERT) 25 MG tablet, Take 1 tablet by mouth every 6 hours as needed., Disp: 30 tablet, Rfl: 0     ALLERGIES:    Allergies   Allergen Reactions     Cephalexin Monohydrate      keflex     Gluten Meal GI Disturbance     Warfarin Sodium      coumadin        SOCIAL HISTORY:   Social History     Social History     Marital status:      Spouse name: Osbaldo     Number of children: 2     Years of education: N/A     Occupational History     Not on file.     Social History Main Topics     Smoking status: Never Smoker     Smokeless tobacco: Never Used     Alcohol use Yes      Comment: 2 drinks per month     Drug use: No     Sexual activity: Yes     Partners: Male     Other Topics Concern     Weight Concern Yes     going to lose 100 pounds     Social History Narrative        FAMILY HISTORY:   Family History   Problem Relation Age of Onset     CANCER Father       of lung cancer     HEART DISEASE Daughter      2 holes in her heart        EXAM:Vitals: Temp 96.5  F (35.8  C) (Temporal)  Ht 6' (1.829 m)  Wt 289 lb (131.1 kg)  LMP 2005  BMI 39.2 kg/m2  BMI= Body mass index is 39.2 kg/(m^2).    General appearance: Patient is alert and fully cooperative with history & exam.  No sign of distress is noted during the visit.     Psychiatric: Affect is pleasant & appropriate.  Patient appears motivated to improve health.     Respiratory: Breathing is regular & unlabored while sitting.     HEENT: Hearing is intact to spoken word.  Speech is clear.  No gross evidence of visual impairment that would impact ambulation.     Vascular: DP & PT  pulses are intact & regular bilaterally.  No significant edema or varicosities noted.  CFT and skin temperature is normal to both lower extremities.     Neurologic: Lower extremity sensation is intact to light touch.  No evidence of weakness or contracture in the lower extremities.  No evidence of neuropathy.    Dermatologic: Skin is intact to both lower extremities with adequate texture, turgor and tone about the integument.  No paronychia or evidence of soft tissue infection is noted.     Musculoskeletal: Patient is ambulatory limping and guarding about the right ankle. Tight edema is noted about the medial lateral ankle anterior ankle. Ecchymosis extends from medial calcaneus to anterior ankle to lateral calcaneus. No pain with direct palpation to the Achilles tendon or fifth metatarsal base or proximal fibula.    Radiographs 3 views right foot and ankle demonstrate joint space narrowing about the ankle mortise as well as medial lateral gutters. No obvious displaced fracture or cortical disruption. Hypertrophic bone changes are noted about the distal tib-fib syndesmosis. Irregular bone morphology noted throughout the ankle. This is consistent with chronic arthritis of the ankle.     ASSESSMENT:       ICD-10-CM    1. Sprain of right ankle, unspecified ligament, initial encounter S93.401A XR Ankle Right 2 Views     XR Foot Right G/E 3 Views     IBUPROFEN PO   2. Traumatic arthritis of ankle, right M12.571 XR Ankle Right 2 Views     XR Foot Right G/E 3 Views     IBUPROFEN PO        PLAN:  Reviewed patient's chart in [facility].      11/13/2017   Intern for grafts of the foot and ankle.  Its been almost 20 years since last sprain so that likely chronic instability. No feeling of weakness over the last years  She does have a history of a bad sprain many decades ago.  Begin compression until no edema   Dispensed fracture boot  Then stay in boot until follow up in three weeks.     Jordan Jang DPM      Again, thank you  for allowing me to participate in the care of your patient.        Sincerely,        Jordan Jang DPM

## 2017-11-13 NOTE — MR AVS SNAPSHOT
After Visit Summary   11/13/2017    Ashli Rogers    MRN: [id number]           Patient Information     Date Of Birth          1955        Visit Information        Provider Department      11/13/2017 2:00 PM Jordan Jang DPM [facility]        Today's Diagnoses     Sprain of right ankle, unspecified ligament, initial encounter    -  1    Traumatic arthritis of ankle, right          Care Instructions    Follow-up in 3 weeks  Utilize the fracture boot for weightbearing activities as tolerated.  Compression dressing daily.          Follow-ups after your visit        Your next 10 appointments already scheduled     Dec 04, 2017  3:45 PM CST   Return Visit with Jordan Jang DPM   [facility] ([facility]    [address] [facility] [address] [phone]              Who to contact     If you have questions or need follow up information about today's clinic visit or your schedule please contact [facility] directly at [phone].  Normal or non-critical lab and imaging results will be communicated to you by [employer], letter or phone within 4 business days after the clinic has received the results. If you do not hear from us within 7 days, please contact the clinic through "[employer]t or phone. If you have a critical or abnormal lab result, we will notify you by phone as soon as possible.  Submit refill requests through [facility] or call your pharmacy and they will forward the refill request to us. Please allow 3 business days for your refill to be completed.          Additional Information About Your Visit        MyChart Information     [facility] gives you secure access to your electronic health record. If you see a primary care provider, you can also send messages to your care team and make appointments. If you have questions, please call your primary care clinic.  If you do not have a primary care provider, please call  [phone] and they will assist you.        Care EveryWhere ID     This is your Care EveryWhere ID. This could be used by other organizations to access your [facility] medical records  [id number]        Your Vitals Were     Temperature Height Last Period BMI (Body Mass Index)          96.5  F (35.8  C) (Temporal) 6' (1.829 m) 01/19/2005 39.2 kg/m2         Blood Pressure from Last 3 Encounters:   03/28/17 121/89   02/01/17 118/80   01/24/17 126/80    Weight from Last 3 Encounters:   11/13/17 289 lb (131.1 kg)   02/01/17 280 lb (127 kg)   01/24/17 286 lb (129.7 kg)               Primary Care Provider Office Phone # Fax #    Lakisha Suzanna Wynne, APRN [facility] [phone] [address] [place] [address]        Equal Access to Services     MANNY SESAY : Hadii aad ku hadasho Soomaali, waaxda luqadaha, qaybta kaalmada adeegyada, waxay vishnuin haymelon jason mcduffie . So [facility] [phone].    ATENCIÓN: Si habla español, tiene a selby disposición servicios gratuitos de asistencia lingüística. Llame al [phone].    We comply with applicable federal civil rights laws and [place] laws. We do not discriminate on the basis of race, color, national origin, age, disability, sex, sexual orientation, or gender identity.            Thank you!     Thank you for choosing [facility]  for your care. Our goal is always to provide you with excellent care. Hearing back from our patients is one way we can continue to improve our services. Please take a few minutes to complete the written survey that you may receive in the mail after your visit with us. Thank you!             Your Updated Medication List - Protect others around you: Learn how to safely use, store and throw away your medicines at www.disposemymeds.org.          This list is accurate as of: 11/13/17  4:32 PM.  Always use your most recent med list.                   Brand Name Dispense Instructions for use Diagnosis     CALCIUM-MAGNESIUM-ZINC PO      Take 1 tablet by mouth daily Reported on 3/28/2017        IBUPROFEN PO       Sprain of right ankle, unspecified ligament, initial encounter, Traumatic arthritis of ankle, right       meclizine 25 MG tablet    ANTIVERT    30 tablet    Take 1 tablet by mouth every 6 hours as needed.    Vertigo       VITAMIN D3 PO      Take by mouth daily        VYVANSE 30 MG capsule   Generic drug:  lisdexamfetamine      Take by mouth every morning        ZYRTEC 10 MG tablet   Generic drug:  cetirizine     30    ONE TABLET DAILY    Allergic rhinitis, cause unspecified

## 2017-11-13 NOTE — NURSING NOTE
Chief Complaint   Patient presents with     Consult     Right foot and ankle pain, onset 11/12/2017; new pt       Initial Temp 96.5  F (35.8  C) (Temporal)  Ht 6' (1.829 m)  Wt 289 lb (131.1 kg)  LMP 01/19/2005  BMI 39.2 kg/m2 Estimated body mass index is 39.2 kg/(m^2) as calculated from the following:    Height as of this encounter: 6' (1.829 m).    Weight as of this encounter: 289 lb (131.1 kg).  BP completed using cuff size: NA (Not Taken)  Medication Reconciliation: complete    Carolina Limon CMA, November 13, 2017

## 2017-11-13 NOTE — NURSING NOTE
Dispensed 1 Pneumatic Walking Brace, Size Large, with [facility] agreement signed by patient. Carolina Limon CMA, November 13, 2017

## 2017-11-13 NOTE — TELEPHONE ENCOUNTER
Reason for Call:  Other appointment    Detailed comments: Patient states she sprained / possible fractured her right foot yesterday and was hoping to be seen today in [place].  Please advise    Phone Number Patient can be reached at: Home number on file [phone] (home)    Best Time: any    Can we leave a detailed message on this number? YES    Call taken on 11/13/2017 at 12:39 PM by Caridad Tapia

## 2017-11-14 ENCOUNTER — CHARTING TRANS (OUTPATIENT)
Dept: OTHER | Age: 62
End: 2017-11-14

## 2017-11-27 ENCOUNTER — CHARTING TRANS (OUTPATIENT)
Dept: OTHER | Age: 62
End: 2017-11-27

## 2017-12-04 ENCOUNTER — OFFICE VISIT (OUTPATIENT)
Dept: PODIATRY | Facility: CLINIC | Age: 62
End: 2017-12-04
Payer: COMMERCIAL

## 2017-12-04 VITALS — HEIGHT: 72 IN | TEMPERATURE: 97.2 F | WEIGHT: 289 LBS | BODY MASS INDEX: 39.14 KG/M2

## 2017-12-04 DIAGNOSIS — S93.491A SPRAIN OF ANTERIOR TALOFIBULAR LIGAMENT OF RIGHT ANKLE, INITIAL ENCOUNTER: Primary | ICD-10-CM

## 2017-12-04 PROCEDURE — 99213 OFFICE O/P EST LOW 20 MIN: CPT | Performed by: PODIATRIST

## 2017-12-04 ASSESSMENT — PAIN SCALES - GENERAL: PAINLEVEL: MODERATE PAIN (4)

## 2017-12-04 NOTE — PROGRESS NOTES
Chief Complaint   Patient presents with     RECHECK     (3w1d) 1 week only in tall gray fx boot, presents today with sandal, swelling, medial Right arch pain, KTtape at heel/ankle, pain 4 - Right ankle sprain, onset 11/12/2017; LOV 11/13/2017       Weight management plan: Patient was referred to their PCP to discuss a diet and exercise plan.     HPI:  Ashli Rogers is a 62 year old female who is seen in consultation at the request of self.    Pt presents for eval of:   (Onset, Location, L/R, Character, Treatments, Injury if yes)    XR Right foot and ankle today, 11/13/2017 11/12/2017, when getting out of hot tub and walking on wet pool deck slipped and twisted Right foot and ankle. Presents today WB with sandals and medial and dorsal Right foot and ankle pain and Right knee pain.  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    General appearance: Patient is alert and fully cooperative with history & exam.  No sign of distress is noted during the visit.     Psychiatric: Affect is pleasant & appropriate.  Patient appears motivated to improve health.     Respiratory: Breathing is regular & unlabored while sitting.     HEENT: Hearing is intact to spoken word.  Speech is clear.  No gross evidence of visual impairment that would impact ambulation.     Vascular: DP & PT pulses are intact & regular bilaterally.  No significant edema or varicosities noted.  CFT and skin temperature is normal to both lower extremities.     Neurologic: Lower extremity sensation is intact to light touch.  No evidence of weakness or contracture in the lower extremities.  No evidence of neuropathy.    Dermatologic: Skin is intact to both lower extremities with  adequate texture, turgor and tone about the integument.  No paronychia or evidence of soft tissue infection is noted.     Musculoskeletal: Patient is ambulatory with tape and regular shoe but still considerable edema noted throughout the foot and ankle right foot. Most discomfort is noted directly over the ATF ligament anterolateral right ankle. No peroneal subluxation. No pain with direct palpation to the Achilles peroneal or posterior tibial tendons bilateral.    Radiographs 3 views right foot and ankle demonstrate joint space narrowing about the ankle mortise as well as medial lateral gutters. No obvious displaced fracture or cortical disruption. Hypertrophic bone changes are noted about the distal tib-fib syndesmosis. Irregular bone morphology noted throughout the ankle. This is consistent with chronic arthritis of the ankle.     ASSESSMENT:       ICD-10-CM    1. Sprain of anterior talofibular ligament of right ankle, initial encounter S93.491A PHYSICAL THERAPY REFERRAL        PLAN:  Reviewed patient's chart in [facility].      11/13/2017   Interpreted radiographs of the foot and ankle.  Its been almost 20 years since last sprain so that likely chronic instability. No feeling of weakness over the last years  She does have a history of a bad sprain many decades ago.  Begin compression until no edema   Dispensed fracture boot  Then stay in boot until follow up in three weeks.     12/4/2017  3 weeks since injury.     She stopped using the boot secondary to balance walking and hip pain  Start using it at night  Start ankle compression brace for a few weeks then go away from it.  Start PT order today, progress as tolerated.    Progress proprioception and strength after edema is resolved.     Jordan Jang DPM

## 2017-12-04 NOTE — MR AVS SNAPSHOT
After Visit Summary   12/4/2017    Ashli Rogers    MRN: [id number]           Patient Information     Date Of Birth          1955        Visit Information        Provider Department      12/4/2017 3:45 PM Jordan Jang DPM [facility]        Care Instructions    Tri Lock ankle brace is a reliable and sturdy ankle brace. A Stromgren double ankle strap, figure of 8 ankle brace or lace up ankle gauntlet or similar brand are most readily available on line, "[employer] or [employer] delivered for around $20.  Health insurance will not usually pay for these.             Follow-ups after your visit        Who to contact     If you have questions or need follow up information about today's clinic visit or your schedule please contact [facility] directly at [phone].  Normal or non-critical lab and imaging results will be communicated to you by Alfredhart, letter or phone within 4 business days after the clinic has received the results. If you do not hear from us within 7 days, please contact the clinic through Alfredhart or phone. If you have a critical or abnormal lab result, we will notify you by phone as soon as possible.  Submit refill requests through Skillset or call your pharmacy and they will forward the refill request to us. Please allow 3 business days for your refill to be completed.          Additional Information About Your Visit        MyChart Information     Skillset gives you secure access to your electronic health record. If you see a primary care provider, you can also send messages to your care team and make appointments. If you have questions, please call your primary care clinic.  If you do not have a primary care provider, please call [phone] and they will assist you.        Care EveryWhere ID     This is your Care EveryWhere ID. This could be used by other organizations to access your [facility] medical records  [id number]        Your Vitals Were      Temperature Height Last Period BMI (Body Mass Index)          97.2  F (36.2  C) (Temporal) 6' (1.829 m) 01/19/2005 39.2 kg/m2         Blood Pressure from Last 3 Encounters:   03/28/17 121/89   02/01/17 118/80   01/24/17 126/80    Weight from Last 3 Encounters:   12/04/17 289 lb (131.1 kg)   11/13/17 289 lb (131.1 kg)   02/01/17 280 lb (127 kg)              Today, you had the following     No orders found for display       Primary Care Provider Office Phone # Fax #    Lakisha Suzanna Wynne, APRN [facility] [phone] [address] [place] [address]        Equal Access to Services     MANNY SESAY : Nel benavidezo Sojesseali, waaxda luqadaha, qaybta kaalmada adeegyada, davin mcduffie . So [facility] [phone].    ATENCIÓN: Si habla español, tiene a selby disposición servicios gratuitos de asistencia lingüística. Llame al [phone].    We comply with applicable federal civil rights laws and [place] laws. We do not discriminate on the basis of race, color, national origin, age, disability, sex, sexual orientation, or gender identity.            Thank you!     Thank you for choosing [facility]  for your care. Our goal is always to provide you with excellent care. Hearing back from our patients is one way we can continue to improve our services. Please take a few minutes to complete the written survey that you may receive in the mail after your visit with us. Thank you!             Your Updated Medication List - Protect others around you: Learn how to safely use, store and throw away your medicines at www.disposemymeds.org.          This list is accurate as of: 12/4/17  4:19 PM.  Always use your most recent med list.                   Brand Name Dispense Instructions for use Diagnosis    CALCIUM-MAGNESIUM-ZINC PO      Take 1 tablet by mouth daily Reported on 3/28/2017        IBUPROFEN PO       Sprain of right ankle, unspecified ligament, initial encounter,  Traumatic arthritis of ankle, right       meclizine 25 MG tablet    ANTIVERT    30 tablet    Take 1 tablet by mouth every 6 hours as needed.    Vertigo       VITAMIN D3 PO      Take by mouth daily        VYVANSE 30 MG capsule   Generic drug:  lisdexamfetamine      Take by mouth every morning        ZYRTEC 10 MG tablet   Generic drug:  cetirizine     30    ONE TABLET DAILY    Allergic rhinitis, cause unspecified

## 2017-12-04 NOTE — PATIENT INSTRUCTIONS
Tri Lock ankle brace is a reliable and sturdy ankle brace. A Stromgren double ankle strap, figure of 8 ankle brace or lace up ankle gauntlet or similar brand are most readily available on line, [employer], or [employer] delivered for around $20.  Health insurance will not usually pay for these.

## 2017-12-04 NOTE — NURSING NOTE
Chief Complaint   Patient presents with     RECHECK     (3w1d) 1 week only in tall gray fx boot, presents today with sandal, swelling, medial Right arch pain, KTtape at heel/ankle, pain 4 - Right ankle sprain, onset 11/12/2017; LOV 11/13/2017       Initial Temp 97.2  F (36.2  C) (Temporal)  Ht 6' (1.829 m)  Wt 289 lb (131.1 kg)  LMP 01/19/2005  BMI 39.2 kg/m2 Estimated body mass index is 39.2 kg/(m^2) as calculated from the following:    Height as of this encounter: 6' (1.829 m).    Weight as of this encounter: 289 lb (131.1 kg).  BP completed using cuff size: NA (Not Taken)  Medication Reconciliation: complete    Carolina Limon CMA, December 4, 2017

## 2018-01-24 ENCOUNTER — OFFICE VISIT (OUTPATIENT)
Dept: PODIATRY | Facility: CLINIC | Age: 63
End: 2018-01-24
Payer: COMMERCIAL

## 2018-01-24 VITALS — HEIGHT: 72 IN | TEMPERATURE: 97.2 F | WEIGHT: 283 LBS | BODY MASS INDEX: 38.33 KG/M2

## 2018-01-24 DIAGNOSIS — S93.491A SPRAIN OF ANTERIOR TALOFIBULAR LIGAMENT OF RIGHT ANKLE, INITIAL ENCOUNTER: Primary | ICD-10-CM

## 2018-01-24 PROCEDURE — 99213 OFFICE O/P EST LOW 20 MIN: CPT | Performed by: PODIATRIST

## 2018-01-24 ASSESSMENT — PAIN SCALES - GENERAL: PAINLEVEL: MILD PAIN (2)

## 2018-01-24 NOTE — MR AVS SNAPSHOT
"              After Visit Summary   1/24/2018    Ashli Rogers    MRN: [id number]           Patient Information     Date Of Birth          1955        Visit Information        Provider Department      1/24/2018 4:00 PM Jordan Jang DPM [facility]        Today's Diagnoses     Sprain of anterior talofibular ligament of right ankle, initial encounter    -  1      Care Instructions    Follow-up as needed          Follow-ups after your visit        Who to contact     If you have questions or need follow up information about today's clinic visit or your schedule please contact [facility] directly at [phone].  Normal or non-critical lab and imaging results will be communicated to you by GoSavehart, letter or phone within 4 business days after the clinic has received the results. If you do not hear from us within 7 days, please contact the clinic through GoSavehart or phone. If you have a critical or abnormal lab result, we will notify you by phone as soon as possible.  Submit refill requests through [employer] or call your pharmacy and they will forward the refill request to us. Please allow 3 business days for your refill to be completed.          Additional Information About Your Visit        MyChart Information     [employer] gives you secure access to your electronic health record. If you see a primary care provider, you can also send messages to your care team and make appointments. If you have questions, please call your primary care clinic.  If you do not have a primary care provider, please call [phone] and they will assist you.        Care EveryWhere ID     This is your Care EveryWhere ID. This could be used by other organizations to access your [facility] medical records  [id number]        Your Vitals Were     Temperature Height Last Period BMI (Body Mass Index)          97.2  F (36.2  C) (Temporal) 5' 11.65\" (1.82 m) 01/19/2005 38.75 kg/m2         Blood Pressure from Last " 3 Encounters:   03/28/17 121/89   02/01/17 118/80   01/24/17 126/80    Weight from Last 3 Encounters:   01/24/18 283 lb (128.4 kg)   12/04/17 289 lb (131.1 kg)   11/13/17 289 lb (131.1 kg)              Today, you had the following     No orders found for display       Primary Care Provider Office Phone # Fax #    Lakisha Wynne, APRN [facility] [phone]       [address] [place] [address]        Equal Access to Services     [facility]: Hadii aad ku hadasho Soomaali, waaxda luqadaha, qaybta kaalmada adeegyada, waxbrendan mcduffie . So [facility] [phone].    ATENCIÓN: Si habla español, tiene a selby disposición servicios gratuitos de asistencia lingüística. [facility] [phone].    We comply with applicable federal civil rights laws and [place] laws. We do not discriminate on the basis of race, color, national origin, age, disability, sex, sexual orientation, or gender identity.            Thank you!     Thank you for choosing [facility]  for your care. Our goal is always to provide you with excellent care. Hearing back from our patients is one way we can continue to improve our services. Please take a few minutes to complete the written survey that you may receive in the mail after your visit with us. Thank you!             Your Updated Medication List - Protect others around you: Learn how to safely use, store and throw away your medicines at www.disposemymeds.org.          This list is accurate as of 1/24/18  4:58 PM.  Always use your most recent med list.                   Brand Name Dispense Instructions for use Diagnosis    CALCIUM-MAGNESIUM-ZINC PO      Take 1 tablet by mouth daily Reported on 3/28/2017        IBUPROFEN PO       Sprain of right ankle, unspecified ligament, initial encounter, Traumatic arthritis of ankle, right       meclizine 25 MG tablet    ANTIVERT    30 tablet    Take 1 tablet by mouth every 6 hours as needed.    Vertigo        VITAMIN D3 PO      Take by mouth daily        VYVANSE 30 MG capsule   Generic drug:  lisdexamfetamine      Take by mouth every morning        ZYRTEC 10 MG tablet   Generic drug:  cetirizine     30    ONE TABLET DAILY    Allergic rhinitis, cause unspecified

## 2018-01-24 NOTE — NURSING NOTE
"Chief Complaint   Patient presents with     RECHECK     (10w3d) swelling, PT, limited ROM w/stairs, pain 2, increasing activity - Right ankle sprain, onset 11/12/2017; LOV 12/4/2017       Initial Temp 97.2  F (36.2  C) (Temporal)  Ht 5' 11.65\" (1.82 m)  Wt 283 lb (128.4 kg)  LMP 01/19/2005  BMI 38.75 kg/m2 Estimated body mass index is 38.75 kg/(m^2) as calculated from the following:    Height as of this encounter: 5' 11.65\" (1.82 m).    Weight as of this encounter: 283 lb (128.4 kg).  BP completed using cuff size: NA (Not Taken)  Medication Reconciliation: complete    Carolina Limon CMA, January 24, 2018  "

## 2018-01-24 NOTE — PROGRESS NOTES
"Chief Complaint   Patient presents with     RECHECK     (10w3d) swelling, PT, limited ROM w/stairs, pain 2, increasing activity - Right ankle sprain, onset 11/12/2017; LOV 12/4/2017       Weight management plan: Patient was referred to their PCP to discuss a diet and exercise plan.     HPI:  Ashli Rogers is a 62 year old female who is seen in consultation at the request of self.    Pt presents for eval of:   (Onset, Location, L/R, Character, Treatments, Injury if yes)    XR Right foot and ankle today, 11/13/2017 11/12/2017, when getting out of hot tub and walking on wet pool deck slipped and twisted Right foot and ankle. Presents today WB with sandals and medial and dorsal Right foot and ankle pain and Right knee pain.  Swelling, sharp, throbbing, numbness, pain 5-7, bruising  Ice, rest, some elevation, ibuprofen    Works as a Speech and Language Pathologist at [employer]     EXAM:Vitals: Temp 97.2  F (36.2  C) (Temporal)  Ht 5' 11.65\" (1.82 m)  Wt 283 lb (128.4 kg)  LMP 01/19/2005  BMI 38.75 kg/m2  BMI= Body mass index is 38.75 kg/(m^2).    General appearance: Patient is alert and fully cooperative with history & exam.  No sign of distress is noted during the visit.     Psychiatric: Affect is pleasant & appropriate.  Patient appears motivated to improve health.     Respiratory: Breathing is regular & unlabored while sitting.     HEENT: Hearing is intact to spoken word.  Speech is clear.  No gross evidence of visual impairment that would impact ambulation.     Vascular: DP & PT pulses are intact & regular bilaterally.  No significant edema or varicosities noted.  CFT and skin temperature is normal to both lower extremities.     Neurologic: Lower extremity sensation is intact to light touch.  No evidence of weakness or contracture in the lower extremities.  No evidence of neuropathy.    Dermatologic: Skin is intact to both lower extremities with adequate texture, turgor and tone about the integument.  " No paronychia or evidence of soft tissue infection is noted.     Musculoskeletal: Patient is ambulatory with tape and regular shoe but still considerable edema noted throughout the foot and ankle right foot. Most discomfort is noted directly over the ATF ligament anterolateral right ankle. No peroneal subluxation. No pain with direct palpation to the Achilles peroneal or posterior tibial tendons bilateral.    Radiographs 3 views right foot and ankle demonstrate joint space narrowing about the ankle mortise as well as medial lateral gutters. No obvious displaced fracture or cortical disruption. Hypertrophic bone changes are noted about the distal tib-fib syndesmosis. Irregular bone morphology noted throughout the ankle. This is consistent with chronic arthritis of the ankle.     ASSESSMENT:       ICD-10-CM    1. Sprain of anterior talofibular ligament of right ankle, initial encounter S93.491A         PLAN:  Reviewed patient's chart in [facility].      11/13/2017   Interpreted radiographs of the foot and ankle.  Its been almost 20 years since last sprain so that likely chronic instability. No feeling of weakness over the last years  She does have a history of a bad sprain many decades ago.  Begin compression until no edema   Dispensed fracture boot  Then stay in boot until follow up in three weeks.     12/4/2017  3 weeks since injury.     She stopped using the boot secondary to balance walking and hip pain  Start using it at night  Start ankle compression brace for a few weeks then go away from it.  Start PT order today, progress as tolerated.    Progress proprioception and strength after edema is resolved.     1/24/2018  Return to all activities as tolerated.  No restrictions.  Discussed continuing proprioception activities without restrictions and make this part of lifestyle.  All questions were answered and she will follow-up as needed.    Jordan Jang DPM

## 2018-02-07 ENCOUNTER — OFFICE VISIT (OUTPATIENT)
Dept: FAMILY MEDICINE | Facility: CLINIC | Age: 63
End: 2018-02-07
Payer: COMMERCIAL

## 2018-02-07 VITALS
OXYGEN SATURATION: 94 % | HEART RATE: 114 BPM | TEMPERATURE: 98.4 F | BODY MASS INDEX: 37.66 KG/M2 | DIASTOLIC BLOOD PRESSURE: 80 MMHG | WEIGHT: 275 LBS | SYSTOLIC BLOOD PRESSURE: 122 MMHG | RESPIRATION RATE: 18 BRPM

## 2018-02-07 DIAGNOSIS — J01.00 SUBACUTE MAXILLARY SINUSITIS: Primary | ICD-10-CM

## 2018-02-07 PROCEDURE — 99213 OFFICE O/P EST LOW 20 MIN: CPT | Performed by: FAMILY MEDICINE

## 2018-02-07 ASSESSMENT — PAIN SCALES - GENERAL: PAINLEVEL: NO PAIN (0)

## 2018-02-07 NOTE — PROGRESS NOTES
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  Additional history: as documented      Reviewed and updated as needed this visit by clinical staff  Tobacco  Allergies  Meds  Problems       Reviewed and updated as needed this visit by Provider  Allergies  Meds  Problems         Patient here today for evaluation of above-noted symptoms.  Specifically, she is concerned about possibility of sinus infection.  She also was not sure if she has something more severe with her lungs as she continues to have coughing with some wheezing.  She has no history of asthma or COPD or a smoking history.    ROS:  10 point ROS of systems including Constitutional, Eyes, HENT, Respiratory, Cardiovascular, Gastroenterology, Genitourinary, Integumentary, Muscularskeletal, Psychiatric were all negative except for pertinent positives noted in my HPI.     OBJECTIVE:   /80  Pulse 114  Temp 98.4  F (36.9  C) (Tympanic)  Resp 18  Wt 275 lb (124.7 kg)  LMP 01/19/2005  SpO2 94%  BMI 37.66 kg/m2  Body mass index is 37.66 kg/(m^2).  Physical Exam   Constitutional: She appears well-developed and well-nourished.   HENT:   Head: Normocephalic.   Right Ear: Tympanic membrane, external ear and ear canal normal.   Left Ear: Tympanic membrane, external ear and ear canal normal.   Nose: Mucosal  edema and rhinorrhea present. Right sinus exhibits maxillary sinus tenderness (Mild). Right sinus exhibits no frontal sinus tenderness. Left sinus exhibits maxillary sinus tenderness (Mild). Left sinus exhibits no frontal sinus tenderness.   Mouth/Throat: Uvula is midline, oropharynx is clear and moist and mucous membranes are normal. Mucous membranes are not dry. No oropharyngeal exudate. No tonsillar exudate.   Eyes: Conjunctivae and lids are normal. Right eye exhibits no discharge. Left eye exhibits no discharge.   Neck: Normal range of motion. Neck supple. No thyromegaly present.   Cardiovascular: Normal rate, regular rhythm, S1 normal, S2 normal and normal heart sounds.    No murmur heard.  Pulmonary/Chest: Effort normal and breath sounds normal. No respiratory distress. She has no wheezes. She has no rhonchi. She has no rales.   Lymphadenopathy:     She has no cervical adenopathy.   Neurological: She is alert.   Skin: Skin is warm. No rash noted. No erythema.         ASSESSMENT/PLAN:       ICD-10-CM    1. Subacute maxillary sinusitis J01.00      PLAN:  1.  Patient has mild bilateral sinusitis symptoms that at this point still seem consistent with viral illness.  We discussed the typical course of bacterial sinus infections and she may contact us if she starts developing unilateral sinus pain, upper tooth pain, or unilateral ear pain as these would be signs of bacterial infection and would necessitate an antibiotic.  I told her that she could talk to 1 of our triage nurses and review her symptoms and if it does appear she has a bacterial infection, I will be happy to call her in an appropriate antibiotic at that time.  2. See below for over the counter medication recommendations to manage her sinus symptoms at this time.    Patient Instructions   1.  Saline sinus rinse (one form comes in a squirt bottle form and the another kind is also known as Neti Pots).  Follow directions on package.  May do this 1-2 times  per day.  2.  May also use Afrin (oxymetazoline) nasal spray for a maximum of 3 days for symptom control.  Do not use for longer than this.  A good idea is to use this medication with saline nasal irrigation.  If you choose to do this, use the Afrin about 30-45 minutes after the sinus rinse to maximize effect of medication.    3.  Sudafed (psudoephedrine) per package directions.  This is the medication that has to be obtained from behind the pharmacist's counter  4.  Antihistamines:  Daytime:  Claritin (loratadine) or zyrtec (cetirizine).  Nighttime:  Benadryl (diphenhydramine).  5.  Tylenol (acetaminophen) or Advil (ibuprofen) as needed for pain and/or fever.         Damaso Liu MD   [facility]

## 2018-02-07 NOTE — NURSING NOTE
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  Cheryl Corado MA

## 2018-02-07 NOTE — NURSING NOTE
"Chief Complaint   Patient presents with     URI       Initial /80  Pulse 114  Temp 98.4  F (36.9  C) (Tympanic)  Resp 18  Wt 275 lb (124.7 kg)  LMP 01/19/2005  SpO2 94%  BMI 37.66 kg/m2 Estimated body mass index is 37.66 kg/(m^2) as calculated from the following:    Height as of 1/24/18: 5' 11.65\" (1.82 m).    Weight as of this encounter: 275 lb (124.7 kg).  BP completed using cuff size: john Corado MA      "

## 2018-02-07 NOTE — MR AVS SNAPSHOT
After Visit Summary   2/7/2018    Ashli Rogers    MRN: [id number]           Patient Information     Date Of Birth          1955        Visit Information        Provider Department      2/7/2018 12:00 PM Damaso Liu MD [facility]        Today's Diagnoses     Subacute maxillary sinusitis    -  1      Care Instructions    1.  Saline sinus rinse (one form comes in a squirt bottle form and the another kind is also known as Neti Pots).  Follow directions on package.  May do this 1-2 times per day.  2.  May also use Afrin (oxymetazoline) nasal spray for a maximum of 3 days for symptom control.  Do not use for longer than this.  A good idea is to use this medication with saline nasal irrigation.  If you choose to do this, use the Afrin about 30-45 minutes after the sinus rinse to maximize effect of medication.    3.  Sudafed (psudoephedrine) per package directions.  This is the medication that has to be obtained from behind the pharmacist's counter  4.  Antihistamines:  Daytime:  Claritin (loratadine) or zyrtec (cetirizine).  Nighttime:  Benadryl (diphenhydramine).  5.  Tylenol (acetaminophen) or Advil (ibuprofen) as needed for pain and/or fever.           Follow-ups after your visit        Who to contact     If you have questions or need follow up information about today's clinic visit or your schedule please contact [facility] directly at [phone].  Normal or non-critical lab and imaging results will be communicated to you by MyChart, letter or phone within 4 business days after the clinic has received the results. If you do not hear from us within 7 days, please contact the clinic through Loopcamhart or phone. If you have a critical or abnormal lab result, we will notify you by phone as soon as possible.  Submit refill requests through [employer] or call your pharmacy and they will forward the refill request to us. Please allow 3 business days for your refill to  be completed.          Additional Information About Your Visit        Mozaik Mediahart Information     dotSyntax gives you secure access to your electronic health record. If you see a primary care provider, you can also send messages to your care team and make appointments. If you have questions, please call your primary care clinic.  If you do not have a primary care provider, please call [phone] and they will assist you.        Care EveryWhere ID     This is your Care EveryWhere ID. This could be used by other organizations to access your [place] medical records  [id number]        Your Vitals Were     Pulse Temperature Respirations Last Period Pulse Oximetry BMI (Body Mass Index)    114 98.4  F (36.9  C) (Tympanic) 18 01/19/2005 94% 37.66 kg/m2       Blood Pressure from Last 3 Encounters:   02/07/18 122/80   03/28/17 121/89   02/01/17 118/80    Weight from Last 3 Encounters:   02/07/18 275 lb (124.7 kg)   01/24/18 283 lb (128.4 kg)   12/04/17 289 lb (131.1 kg)              Today, you had the following     No orders found for display       Primary Care Provider Office Phone # Fax #    Lakisha Wynne, ADRIENNE [facility] [phone] [address] [place] [address]        Equal Access to Services     MANNY SESAY : Hadii jodie ku hadasho Soomaali, waaxda luqadaha, qaybta kaalmada adeegyada, waxay idiin haymelon jason rivera. So [facility] [phone].    ATENCIÓN: Si habla español, tiene a selby disposición servicios gratuitos de asistencia lingüística. Llame al [phone].    We comply with applicable federal civil rights laws and [place] laws. We do not discriminate on the basis of race, color, national origin, age, disability, sex, sexual orientation, or gender identity.            Thank you!     Thank you for choosing [facility]  for your care. Our goal is always to provide you with excellent care. Hearing back from our patients is one way we can continue to improve our services.  Please take a few minutes to complete the written survey that you may receive in the mail after your visit with us. Thank you!             Your Updated Medication List - Protect others around you: Learn how to safely use, store and throw away your medicines at www.disposemymeds.org.          This list is accurate as of 2/7/18  1:08 PM.  Always use your most recent med list.                   Brand Name Dispense Instructions for use Diagnosis    CALCIUM-MAGNESIUM-ZINC PO      Take 1 tablet by mouth daily Reported on 3/28/2017        IBUPROFEN PO       Sprain of right ankle, unspecified ligament, initial encounter, Traumatic arthritis of ankle, right       VITAMIN D3 PO      Take by mouth daily        VYVANSE 30 MG capsule   Generic drug:  lisdexamfetamine      Take by mouth every morning        ZYRTEC 10 MG tablet   Generic drug:  cetirizine     30    ONE TABLET DAILY    Allergic rhinitis, cause unspecified

## 2018-03-07 ENCOUNTER — CHARTING TRANS (OUTPATIENT)
Dept: OTHER | Age: 63
End: 2018-03-07

## 2018-03-09 ENCOUNTER — CHARTING TRANS (OUTPATIENT)
Dept: OTHER | Age: 63
End: 2018-03-09

## 2018-03-12 ENCOUNTER — CHARTING TRANS (OUTPATIENT)
Dept: OTHER | Age: 63
End: 2018-03-12

## 2018-04-02 ENCOUNTER — MYAURORA ACCOUNT LINK (OUTPATIENT)
Dept: OTHER | Age: 63
End: 2018-04-02

## 2018-04-02 ENCOUNTER — CHARTING TRANS (OUTPATIENT)
Dept: OTHER | Age: 63
End: 2018-04-02

## 2018-04-03 ENCOUNTER — CHARTING TRANS (OUTPATIENT)
Dept: OTHER | Age: 63
End: 2018-04-03

## 2018-04-09 ENCOUNTER — CHARTING TRANS (OUTPATIENT)
Dept: OTHER | Age: 63
End: 2018-04-09

## 2018-04-09 ENCOUNTER — IMAGING SERVICES (OUTPATIENT)
Dept: OTHER | Age: 63
End: 2018-04-09

## 2018-04-13 ENCOUNTER — CHARTING TRANS (OUTPATIENT)
Dept: OTHER | Age: 63
End: 2018-04-13

## 2018-04-17 ENCOUNTER — CHARTING TRANS (OUTPATIENT)
Dept: OTHER | Age: 63
End: 2018-04-17

## 2018-04-30 ENCOUNTER — LAB SERVICES (OUTPATIENT)
Dept: OTHER | Age: 63
End: 2018-04-30

## 2018-04-30 LAB
ALBUMIN SERPL-MCNC: 3.9 G/DL (ref 3.6–5.1)
ALP SERPL-CCNC: 77 U/L (ref 45–105)
ALT SERPL-CCNC: 38 U/L (ref 15–43)
AST SERPL-CCNC: 30 U/L (ref 14–43)
BASOPHIL %: 0.5 % (ref 0–1.2)
BASOPHIL ABSOLUTE #: 0 10*3/UL (ref 0–0.1)
BILIRUB SERPL-MCNC: 0.8 MG/DL (ref 0–1.3)
BUN SERPL-MCNC: 19 MG/DL (ref 7–20)
CALCIUM SERPL-MCNC: 9.9 MG/DL (ref 8.6–10.6)
CHLORIDE SERPL-SCNC: 106 MMOL/L (ref 96–107)
CHOLEST SERPL-MCNC: 142 MG/DL (ref 140–200)
CO2 SERPL-SCNC: 29 MMOL/L (ref 22–32)
CREAT SERPL-MCNC: 0.7 MG/DL (ref 0.5–1.4)
DIFFERENTIAL TYPE: ABNORMAL
EOSINOPHIL %: 4.2 % (ref 0–10)
EOSINOPHIL ABSOLUTE #: 0.3 10*3/UL (ref 0–0.5)
GFR SERPL CREATININE-BSD FRML MDRD: >60 ML/MIN/{1.73M2}
GFR SERPL CREATININE-BSD FRML MDRD: >60 ML/MIN/{1.73M2}
GLUCOSE P FAST SERPL-MCNC: 95 MG/DL (ref 60–100)
HDLC SERPL-MCNC: 53 MG/DL
HEMATOCRIT: 39.7 % (ref 34–45)
HEMOGLOBIN: 12.6 G/DL (ref 11.2–15.7)
LDLC SERPL CALC-MCNC: 78 MG/DL (ref 30–100)
LYMPH PERCENT: 30.4 % (ref 20.5–51.1)
LYMPHOCYTE ABSOLUTE #: 1.8 10*3/UL (ref 1.2–3.4)
MEAN CORPUSCULAR HGB CONCENTRATION: 31.7 % (ref 32–36)
MEAN CORPUSCULAR HGB: 31.4 PG (ref 27–34)
MEAN CORPUSCULAR VOLUME: 99 FL (ref 79–95)
MEAN PLATELET VOLUME: 12 FL (ref 8.6–12.4)
MONOCYTE ABSOLUTE #: 0.7 10*3/UL (ref 0.2–0.9)
MONOCYTE PERCENT: 10.9 % (ref 4.3–12.9)
NEUTROPHIL ABSOLUTE #: 3.2 10*3/UL (ref 1.4–6.5)
NEUTROPHIL PERCENT: 54 % (ref 34–73.5)
PLATELET COUNT: 237 10*3/UL (ref 150–400)
POTASSIUM SERPL-SCNC: 4.5 MMOL/L (ref 3.5–5.3)
PROT SERPL-MCNC: 7.1 G/DL (ref 6.4–8.5)
RED BLOOD CELL COUNT: 4.01 10*6/UL (ref 3.7–5.2)
RED CELL DISTRIBUTION WIDTH: 14.5 % (ref 11.3–14.8)
SODIUM SERPL-SCNC: 145 MMOL/L (ref 136–146)
TRIGL SERPL-MCNC: 57 MG/DL (ref 0–200)
TSH SERPL DL<=0.05 MIU/L-ACNC: 0.59 M[IU]/L (ref 0.3–4.82)
WHITE BLOOD CELL COUNT: 6 10*3/UL (ref 4–10)

## 2018-05-01 ENCOUNTER — CHARTING TRANS (OUTPATIENT)
Dept: OTHER | Age: 63
End: 2018-05-01

## 2018-05-14 ENCOUNTER — CHARTING TRANS (OUTPATIENT)
Dept: OTHER | Age: 63
End: 2018-05-14

## 2018-06-11 ENCOUNTER — CHARTING TRANS (OUTPATIENT)
Dept: OTHER | Age: 63
End: 2018-06-11

## 2018-06-22 ENCOUNTER — IMAGING SERVICES (OUTPATIENT)
Dept: OTHER | Age: 63
End: 2018-06-22

## 2018-07-05 ENCOUNTER — MYAURORA ACCOUNT LINK (OUTPATIENT)
Dept: OTHER | Age: 63
End: 2018-07-05

## 2018-07-05 ENCOUNTER — CHARTING TRANS (OUTPATIENT)
Dept: OTHER | Age: 63
End: 2018-07-05

## 2018-07-18 ENCOUNTER — CHARTING TRANS (OUTPATIENT)
Dept: OTHER | Age: 63
End: 2018-07-18

## 2018-07-20 ENCOUNTER — CHARTING TRANS (OUTPATIENT)
Dept: OTHER | Age: 63
End: 2018-07-20

## 2018-08-01 ENCOUNTER — CHARTING TRANS (OUTPATIENT)
Dept: OTHER | Age: 63
End: 2018-08-01

## 2018-08-21 ENCOUNTER — LAB SERVICES (OUTPATIENT)
Dept: OTHER | Age: 63
End: 2018-08-21

## 2018-08-21 ENCOUNTER — MYAURORA ACCOUNT LINK (OUTPATIENT)
Dept: OTHER | Age: 63
End: 2018-08-21

## 2018-08-21 ENCOUNTER — CHARTING TRANS (OUTPATIENT)
Dept: OTHER | Age: 63
End: 2018-08-21

## 2018-08-23 ENCOUNTER — CHARTING TRANS (OUTPATIENT)
Dept: OTHER | Age: 63
End: 2018-08-23

## 2018-08-23 LAB — AP REPORT: NORMAL

## 2018-09-10 ENCOUNTER — CHARTING TRANS (OUTPATIENT)
Dept: OTHER | Age: 63
End: 2018-09-10

## 2018-09-21 ENCOUNTER — CHARTING TRANS (OUTPATIENT)
Dept: OTHER | Age: 63
End: 2018-09-21

## 2018-10-05 ENCOUNTER — MYAURORA ACCOUNT LINK (OUTPATIENT)
Dept: OTHER | Age: 63
End: 2018-10-05

## 2018-10-05 ENCOUNTER — CHARTING TRANS (OUTPATIENT)
Dept: OTHER | Age: 63
End: 2018-10-05

## 2018-10-11 ENCOUNTER — CHARTING TRANS (OUTPATIENT)
Dept: OTHER | Age: 63
End: 2018-10-11

## 2018-10-11 ENCOUNTER — MYAURORA ACCOUNT LINK (OUTPATIENT)
Dept: OTHER | Age: 63
End: 2018-10-11

## 2018-10-17 ENCOUNTER — OFFICE VISIT (OUTPATIENT)
Dept: URGENT CARE | Facility: RETAIL CLINIC | Age: 63
End: 2018-10-17
Payer: COMMERCIAL

## 2018-10-17 VITALS
SYSTOLIC BLOOD PRESSURE: 125 MMHG | DIASTOLIC BLOOD PRESSURE: 74 MMHG | OXYGEN SATURATION: 99 % | TEMPERATURE: 97.7 F | HEART RATE: 75 BPM

## 2018-10-17 DIAGNOSIS — J06.9 UPPER RESPIRATORY TRACT INFECTION, UNSPECIFIED TYPE: Primary | ICD-10-CM

## 2018-10-17 DIAGNOSIS — J02.9 ACUTE PHARYNGITIS, UNSPECIFIED ETIOLOGY: ICD-10-CM

## 2018-10-17 DIAGNOSIS — J01.90 ACUTE SINUSITIS WITH SYMPTOMS > 10 DAYS: ICD-10-CM

## 2018-10-17 DIAGNOSIS — R05.9 COUGH: ICD-10-CM

## 2018-10-17 PROCEDURE — 87880 STREP A ASSAY W/OPTIC: CPT | Mod: QW | Performed by: PHYSICIAN ASSISTANT

## 2018-10-17 PROCEDURE — 87081 CULTURE SCREEN ONLY: CPT | Performed by: PHYSICIAN ASSISTANT

## 2018-10-17 PROCEDURE — 99213 OFFICE O/P EST LOW 20 MIN: CPT | Performed by: PHYSICIAN ASSISTANT

## 2018-10-17 NOTE — PATIENT INSTRUCTIONS
Throat culture pending - will be notified of positive results only.      Please FOLLOW UP at primary care clinic if not improving, new symptoms, worse or this does not resolve.  [facility] [place] [facility]  [phone]     * PHARYNGITIS (Sore Throat),REPORT PENDING    Pharyngitis (sore throat) is often due to a virus, but can also be caused by the  strep  bacteria. This is called  strep throat . Both viral and strep infection can cause throat pain that is worse when swallowing, aching all over with headache and fever. Both types of infections are contagious. They may be spread by coughing, kissing or touching others after touching your mouth or nose, so wash your hands often.  A test has been done to determine whether or not you have strep throat. If it is positive for strep infection you will usually need to take antibiotics. If the test is negative, you probably have a viral pharyngitis, and antibiotic treatment will not help you recover.  HOME CARE:      If your symptoms are severe, rest at home for the first 2-3 days. If you are told that your test is positive for strep, you should be off work and school for the first two days of antibiotic treatment. After that, you will no longer be as contagious.    Children: Use acetaminophen (Tylenol) for fever, fussiness or discomfort. In infants over six months of age, you may use ibuprofen (Children's Motrin) instead of Tylenol. [NOTE: If your child has chronic liver or kidney disease or ever had a stomach ulcer or GI bleeding, talk with your doctor before using these medicines.]   (Aspirin should never be used in anyone under 18 years of age who is ill with a fever. It may cause severe liver damage.)  Adults: You may use acetaminophen (Tylenol) 650-1000 mg every 6 hours or ibuprofen (Motrin, Advil) 600 mg every 6-8 hours with food to control pain, if you are able to take these medicines. [NOTE: If you have chronic liver or kidney disease or ever had a stomach ulcer or GI  bleeding, talk with your doctor before using these medicines.]    Throat lozenges or sprays (Chloraseptic and others), or gargling with warm salt water will reduce throat pain. Dissolve 1/2 teaspoon of salt in 1 glass of warm water. This is especially useful just before meals.     FOLLOW UP with your doctor as advised by our staff if you are not improving over the next week.  GET PROMPT MEDICAL ATTENTION  if any of the following occur:    Fever over 101 F (38.3 C) for more than three days    New or worsening ear pain, sinus pain or headache    Unable to swallow liquids or open your mouth wide due to throat pain    Trouble breathing    Muffled voice    New rash       0814-7072 [employer]. [address] [place] [address], [place] [address]. All rights reserved. This information is not intended as a substitute for professional medical care. Always follow your healthcare professional's instructions.  This information has been modified by your health care provider with permission from the publisher.

## 2018-10-17 NOTE — PROGRESS NOTES
Chief Complaint   Patient presents with     Throat Pain     2 weeks; pt believes is post nasal drip; using zycam, mucinex     Cough     10 days     Nasal Congestion     had a cold beginning 2 weeks ago, has improved     Headache     sinus pressure days ago         SUBJECTIVE:   Pt. presenting to [facility] -  with a chief complaint of URI symptoms x 2 weeks. Not improving. Now ST.   See CC.   Hx of asthma not for years.  Onset of symptoms gradual  Course of illness is same.    Severity moderate  Current and Associated symptoms: runny nose, stuffy nose, cough - non-productive, sore throat, hoarse voice, facial pain/pressure and headache  Treatment measures tried include Tylenol/Ibuprofen, Decongestants and Fluids.  Predisposing factors include works in schools.  Last antibiotic > year     ROS:  Afebrile   Energy level is <  ENT - denies ear pain  CP - No SOB or chest pain   GI- - appetite ok. No nausea, vomiting or diarrhea.   No bowel or bladder changes   MSK - no joint pain or swelling   Skin: No rashes      Past Medical History:   Diagnosis Date     Closed fracture of medial malleolus     Distal evulsion fracture of the medial malleoli     Diverticulitis of colon 2/3/2017     DJD (degenerative joint disease) 5/24/2011     Erythema nodosum      Generalized osteoarthrosis, unspecified site     Hips     S/P total hip arthroplasty 4/4/2003     Past Surgical History:   Procedure Laterality Date     C TOTAL HIP ARTHROPLASTY  4/26/04    Hip Replacement, Total     COLONOSCOPY N/A 5/11/2016    Procedure: COLONOSCOPY;  Surgeon: Ervin Johnston MD;  Location:  GI     ESOPHAGOSCOPY, GASTROSCOPY, DUODENOSCOPY (EGD), COMBINED N/A 5/11/2016    Procedure: COMBINED ESOPHAGOSCOPY, GASTROSCOPY, DUODENOSCOPY (EGD), BIOPSY SINGLE OR MULTIPLE;  Surgeon: Ervin Johnston MD;  Location:  GI     HC COLONOSCOPY W/WO BRUSH/WASH  1/11/2006     Patient Active Problem List   Diagnosis     S/P total hip  arthroplasty     Varicose veins of lower extremities with complications     Allergic rhinitis     HYPERLIPIDEMIA LDL GOAL <160     DJD (degenerative joint disease) of knee     Advanced directives, counseling/discussion     ADD (attention deficit disorder)     Gastroesophageal reflux disease, esophagitis presence not specified     Morbid obesity due to excess calories (H)     Hiatal hernia     Diverticulitis of colon     Current Outpatient Prescriptions   Medication     CALCIUM-MAGNESIUM-ZINC PO     Cholecalciferol (VITAMIN D3 PO)     IBUPROFEN PO     lisdexamfetamine (VYVANSE) 30 MG capsule     ZYRTEC 10 MG OR TABS     No current facility-administered medications for this visit.          OBJECTIVE:  /74 (BP Location: Right arm)  Pulse 75  Temp 97.7  F (36.5  C) (Oral)  LMP 01/19/2005  SpO2 99%    GENERAL APPEARANCE: cooperative, alert and no distress. Appears well hydrated.  EYES: conjunctiva clear  HENT: Rt ear canal  clear and TM normal   Lt ear canal clear and TM normal   Nose mod congestion. thick discharge  Mouth without ulcers or lesions. mild erythema. no exudate. PND noted  NECK: supple, few small shoddy NT ant nodes. No  posterior nodes.  RESP: lungs clear to auscultation - no rales, rhonchi or wheezes. Breathing easily.  CV: regular rates and rhythm  ABDOMEN:  soft, nontender, no HSM or masses and bowel sounds normal   SKIN: no suspicious lesions or rashes  no tenderness to palpate over  sinus areas.    Rapid strep neg    ASSESSMENT:     Acute pharyngitis, unspecified etiology  Cough  Upper respiratory tract infection, unspecified type  Acute sinusitis with symptoms > 10 days      PLAN:  Symptomatic measures   Prescriptions as below. Discussed indications, dosing, side affects and adverse reactions of medications with  Patient -Augmentin - 'watchful waiting' - fill if sym[toms not improving in a week, 'worse', fever.  Eat yogurt daily or take a probiotic supplement when on antibiotics.  OTC cough  suppressant/expectorant discussed.  Salt water gargles  -throat lozenges or honey/lemon tea if soothing   Throat culture pending - will be notified of positive results only..  saline nasal spray for  nasal congestion .  Cool mist vaporizer.   Stay in clean air environment.  > rest.  > fluids.  Contagiousness and hygiene discussed.  Fever and pain  control measures discusse  If unable to swallow or any breathing difficulty to go to ED   AVS given and discussed:  Patient Instructions   Throat culture pending - will be notified of positive results only.      Please FOLLOW UP at primary care clinic if not improving, new symptoms, worse or this does not resolve.  [facility]  [phone]     * PHARYNGITIS (Sore Throat),REPORT PENDING    Pharyngitis (sore throat) is often due to a virus, but can also be caused by the  strep  bacteria. This is called  strep throat . Both viral and strep infection can cause throat pain that is worse when swallowing, aching all over with headache and fever. Both types of infections are contagious. They may be spread by coughing, kissing or touching others after touching your mouth or nose, so wash your hands often.  A test has been done to determine whether or not you have strep throat. If it is positive for strep infection you will usually need to take antibiotics. If the test is negative, you probably have a viral pharyngitis, and antibiotic treatment will not help you recover.  HOME CARE:      If your symptoms are severe, rest at home for the first 2-3 days. If you are told that your test is positive for strep, you should be off work and school for the first two days of antibiotic treatment. After that, you will no longer be as contagious.    Children: Use acetaminophen (Tylenol) for fever, fussiness or discomfort. In infants over six months of age, you may use ibuprofen (Children's Motrin) instead of Tylenol. [NOTE: If your child has chronic liver or kidney disease or ever  had a stomach ulcer or GI bleeding, talk with your doctor before using these medicines.]   (Aspirin should never be used in anyone under 18 years of age who is ill with a fever. It may cause severe liver damage.)  Adults: You may use acetaminophen (Tylenol) 650-1000 mg every 6 hours or ibuprofen (Motrin, Advil) 600 mg every 6-8 hours with food to control pain, if you are able to take these medicines. [NOTE: If you have chronic liver or kidney disease or ever had a stomach ulcer or GI bleeding, talk with your doctor before using these medicines.]    Throat lozenges or sprays (Chloraseptic and others), or gargling with warm salt water will reduce throat pain. Dissolve 1/2 teaspoon of salt in 1 glass of warm water. This is especially useful just before meals.     FOLLOW UP with your doctor as advised by our staff if you are not improving over the next week.  GET PROMPT MEDICAL ATTENTION  if any of the following occur:    Fever over 101 F (38.3 C) for more than three days    New or worsening ear pain, sinus pain or headache    Unable to swallow liquids or open your mouth wide due to throat pain    Trouble breathing    Muffled voice    New rash       6084-4285 [employer]. [address] [place] [address], [place] [address]. All rights reserved. This information is not intended as a substitute for professional medical care. Always follow your healthcare professional's instructions.  This information has been modified by your health care provider with permission from the publisher.      .Pt is comfortable with this plan.  Electronically signed,  GENIE Saini

## 2018-10-17 NOTE — MR AVS SNAPSHOT
After Visit Summary   10/17/2018    Ashli Rogers    MRN: [id number]           Patient Information     Date Of Birth          1955        Visit Information        Provider Department      10/17/2018 11:30 AM Ceci Traylor PA-C [facility]        Today's Diagnoses     Acute pharyngitis, unspecified etiology    -  1    Cough        Upper respiratory tract infection, unspecified type        Acute sinusitis with symptoms > 10 days          Care Instructions    Throat culture pending - will be notified of positive results only.      Please FOLLOW UP at primary care clinic if not improving, new symptoms, worse or this does not resolve.  [facility]  [phone]     * PHARYNGITIS (Sore Throat),REPORT PENDING    Pharyngitis (sore throat) is often due to a virus, but can also be caused by the  strep  bacteria. This is called  strep throat . Both viral and strep infection can cause throat pain that is worse when swallowing, aching all over with headache and fever. Both types of infections are contagious. They may be spread by coughing, kissing or touching others after touching your mouth or nose, so wash your hands often.  A test has been done to determine whether or not you have strep throat. If it is positive for strep infection you will usually need to take antibiotics. If the test is negative, you probably have a viral pharyngitis, and antibiotic treatment will not help you recover.  HOME CARE:      If your symptoms are severe, rest at home for the first 2-3 days. If you are told that your test is positive for strep, you should be off work and school for the first two days of antibiotic treatment. After that, you will no longer be as contagious.    Children: Use acetaminophen (Tylenol) for fever, fussiness or discomfort. In infants over six months of age, you may use ibuprofen (Children's Motrin) instead of Tylenol. [NOTE: If your child has chronic liver or kidney  disease or ever had a stomach ulcer or GI bleeding, talk with your doctor before using these medicines.]   (Aspirin should never be used in anyone under 18 years of age who is ill with a fever. It may cause severe liver damage.)  Adults: You may use acetaminophen (Tylenol) 650-1000 mg every 6 hours or ibuprofen (Motrin, Advil) 600 mg every 6-8 hours with food to control pain, if you are able to take these medicines. [NOTE: If you have chronic liver or kidney disease or ever had a stomach ulcer or GI bleeding, talk with your doctor before using these medicines.]    Throat lozenges or sprays (Chloraseptic and others), or gargling with warm salt water will reduce throat pain. Dissolve 1/2 teaspoon of salt in 1 glass of warm water. This is especially useful just before meals.     FOLLOW UP with your doctor as advised by our staff if you are not improving over the next week.  GET PROMPT MEDICAL ATTENTION  if any of the following occur:    Fever over 101 F (38.3 C) for more than three days    New or worsening ear pain, sinus pain or headache    Unable to swallow liquids or open your mouth wide due to throat pain    Trouble breathing    Muffled voice    New rash       4171-4012 [employer]. [address] [place] [address], [place] [address]. All rights reserved. This information is not intended as a substitute for professional medical care. Always follow your healthcare professional's instructions.  This information has been modified by your health care provider with permission from the publisher.            Follow-ups after your visit        Who to contact     You can reach your care team any time of the day by calling [phone].  Notification of test results:  If you have an abnormal lab result, we will notify you by phone as soon as possible.         Additional Information About Your Visit        Pinocularhart Information     [employer] gives you secure access to your electronic health record. If you see a primary care  provider, you can also send messages to your care team and make appointments. If you have questions, please call your primary care clinic.  If you do not have a primary care provider, please call [phone] and they will assist you.        Care EveryWhere ID     This is your Care EveryWhere ID. This could be used by other organizations to access your [place] medical records  [id number]        Your Vitals Were     Pulse Temperature Last Period Pulse Oximetry          75 97.7  F (36.5  C) (Oral) 01/19/2005 99%         Blood Pressure from Last 3 Encounters:   10/17/18 125/74   02/07/18 122/80   03/28/17 121/89    Weight from Last 3 Encounters:   02/07/18 275 lb (124.7 kg)   01/24/18 283 lb (128.4 kg)   12/04/17 289 lb (131.1 kg)              We Performed the Following     BETA STREP GROUP A R/O CULTURE     RAPID STREP SCREEN          Today's Medication Changes          These changes are accurate as of 10/17/18 12:25 PM.  If you have any questions, ask your nurse or doctor.               Start taking these medicines.        Dose/Directions    amoxicillin-clavulanate 875-125 MG per tablet   Commonly known as:  AUGMENTIN   Used for:  Acute sinusitis with symptoms > 10 days        Dose:  1 tablet   Take 1 tablet by mouth 2 times daily   Quantity:  20 tablet   Refills:  0            Where to get your medicines      Some of these will need a paper prescription and others can be bought over the counter.  Ask your nurse if you have questions.     Bring a paper prescription for each of these medications     amoxicillin-clavulanate 875-125 MG per tablet                Primary Care Provider Office Phone # Fax #    ADRIENNE Loya [facility] [phone] [address] [place] [address]        Equal Access to Services     [facility] HospitalRAFAEL AH: Nel Olivia, vi smith, davin qiuroga. So [facility] [phone].    ATENCIÓN: [place] carlson  español, tiene a selby disposición servicios gratuitos de asistencia lingüística. Arianne bernabe [phone].    We comply with applicable federal civil rights laws and [place] laws. We do not discriminate on the basis of race, color, national origin, age, disability, sex, sexual orientation, or gender identity.            Thank you!     Thank you for choosing [facility]  for your care. Our goal is always to provide you with excellent care. Hearing back from our patients is one way we can continue to improve our services. Please take a few minutes to complete the written survey that you may receive in the mail after your visit with us. Thank you!             Your Updated Medication List - Protect others around you: Learn how to safely use, store and throw away your medicines at www.disposemymeds.org.          This list is accurate as of 10/17/18 12:25 PM.  Always use your most recent med list.                   Brand Name Dispense Instructions for use Diagnosis    amoxicillin-clavulanate 875-125 MG per tablet    AUGMENTIN    20 tablet    Take 1 tablet by mouth 2 times daily    Acute sinusitis with symptoms > 10 days       CALCIUM-MAGNESIUM-ZINC PO      Take 1 tablet by mouth daily Reported on 3/28/2017        IBUPROFEN PO       Sprain of right ankle, unspecified ligament, initial encounter, Traumatic arthritis of ankle, right       VITAMIN D3 PO      Take by mouth daily        VYVANSE 30 MG capsule   Generic drug:  lisdexamfetamine      Take by mouth every morning        ZYRTEC 10 MG tablet   Generic drug:  cetirizine     30    ONE TABLET DAILY    Allergic rhinitis, cause unspecified

## 2018-10-19 LAB
BACTERIA SPEC CULT: NORMAL
SPECIMEN SOURCE: NORMAL

## 2018-11-06 ENCOUNTER — CHARTING TRANS (OUTPATIENT)
Dept: OTHER | Age: 63
End: 2018-11-06

## 2018-11-09 ENCOUNTER — CHARTING TRANS (OUTPATIENT)
Dept: OTHER | Age: 63
End: 2018-11-09

## 2018-11-13 ENCOUNTER — CHARTING TRANS (OUTPATIENT)
Dept: OTHER | Age: 63
End: 2018-11-13

## 2018-11-13 ENCOUNTER — LAB SERVICES (OUTPATIENT)
Dept: OTHER | Age: 63
End: 2018-11-13

## 2018-11-13 LAB
BILIRUBIN URINE: NEGATIVE
BLOOD URINE: ABNORMAL
CLARITY: ABNORMAL
COLOR: YELLOW
GLUCOSE QUALITATIVE U: NEGATIVE
KETONES, URINE: NEGATIVE
LEUKOCYTE ESTERASE URINE: NEGATIVE
NITRITE URINE: NEGATIVE
PH URINE: 7 (ref 5–7)
SPECIFIC GRAVITY URINE: 1.02 (ref 1–1.03)
URINE PROTEIN, QUAL (DIPSTICK): 100
UROBILINOGEN URINE: <2

## 2018-11-14 ENCOUNTER — CHARTING TRANS (OUTPATIENT)
Dept: OTHER | Age: 63
End: 2018-11-14

## 2018-11-23 ENCOUNTER — IMAGING SERVICES (OUTPATIENT)
Dept: OTHER | Age: 63
End: 2018-11-23

## 2018-11-27 ENCOUNTER — LAB SERVICES (OUTPATIENT)
Dept: OTHER | Age: 63
End: 2018-11-27

## 2018-11-27 LAB
BILIRUBIN URINE: NEGATIVE
BLOOD URINE: NEGATIVE
CLARITY: NORMAL
COLOR: YELLOW
GLUCOSE QUALITATIVE U: NEGATIVE
HYALINE CAST: ABNORMAL
KETONES, URINE: NEGATIVE
LEUKOCYTE ESTERASE URINE: NEGATIVE
MUCOUS: ABNORMAL
NITRITE URINE: NEGATIVE
PH URINE: 7 (ref 5–7)
RED BLOOD CELLS URINE: ABNORMAL (ref 0–3)
SPECIFIC GRAVITY URINE: 1.02 (ref 1–1.03)
SQUAMOUS EPITHELIAL CELLS: ABNORMAL
URINE PROTEIN, QUAL (DIPSTICK): NEGATIVE
UROBILINOGEN URINE: <2
WHITE BLOOD CELLS URINE: 0 (ref 0–5)

## 2018-11-28 VITALS
DIASTOLIC BLOOD PRESSURE: 80 MMHG | TEMPERATURE: 97.2 F | HEART RATE: 88 BPM | RESPIRATION RATE: 22 BRPM | BODY MASS INDEX: 37.04 KG/M2 | WEIGHT: 236 LBS | HEIGHT: 67 IN | SYSTOLIC BLOOD PRESSURE: 130 MMHG

## 2018-11-28 VITALS
DIASTOLIC BLOOD PRESSURE: 82 MMHG | TEMPERATURE: 100.2 F | HEART RATE: 96 BPM | HEIGHT: 67 IN | WEIGHT: 241 LBS | BODY MASS INDEX: 37.83 KG/M2 | SYSTOLIC BLOOD PRESSURE: 140 MMHG | RESPIRATION RATE: 16 BRPM

## 2018-11-28 VITALS
TEMPERATURE: 98.3 F | HEART RATE: 84 BPM | DIASTOLIC BLOOD PRESSURE: 88 MMHG | BODY MASS INDEX: 36.65 KG/M2 | SYSTOLIC BLOOD PRESSURE: 128 MMHG | RESPIRATION RATE: 16 BRPM | WEIGHT: 234 LBS

## 2018-11-28 VITALS
SYSTOLIC BLOOD PRESSURE: 148 MMHG | HEART RATE: 64 BPM | TEMPERATURE: 98.1 F | WEIGHT: 241 LBS | DIASTOLIC BLOOD PRESSURE: 82 MMHG

## 2018-11-28 VITALS
DIASTOLIC BLOOD PRESSURE: 74 MMHG | RESPIRATION RATE: 18 BRPM | OXYGEN SATURATION: 96 % | TEMPERATURE: 98.5 F | HEART RATE: 104 BPM | WEIGHT: 234 LBS | SYSTOLIC BLOOD PRESSURE: 116 MMHG

## 2018-11-30 VITALS — SYSTOLIC BLOOD PRESSURE: 138 MMHG | DIASTOLIC BLOOD PRESSURE: 80 MMHG | WEIGHT: 236 LBS

## 2018-12-01 ENCOUNTER — DOCUMENTATION (OUTPATIENT)
Dept: OPHTHALMOLOGY | Age: 63
End: 2018-12-01

## 2018-12-09 RX ORDER — VERAPAMIL HYDROCHLORIDE 240 MG/1
TABLET, FILM COATED, EXTENDED RELEASE ORAL
Qty: 90 TABLET | Refills: 0 | Status: SHIPPED | OUTPATIENT
Start: 2018-12-09 | End: 2019-03-09 | Stop reason: SDUPTHER

## 2019-01-16 ENCOUNTER — TELEPHONE (OUTPATIENT)
Dept: FAMILY MEDICINE | Age: 64
End: 2019-01-16

## 2019-01-17 RX ORDER — LOSARTAN POTASSIUM AND HYDROCHLOROTHIAZIDE 25; 100 MG/1; MG/1
TABLET ORAL
Qty: 90 TABLET | Refills: 0 | Status: SHIPPED | OUTPATIENT
Start: 2019-01-17 | End: 2019-04-08 | Stop reason: SDUPTHER

## 2019-01-17 RX ORDER — LOSARTAN POTASSIUM AND HYDROCHLOROTHIAZIDE 25; 100 MG/1; MG/1
TABLET ORAL
COMMUNITY
Start: 2018-07-05 | End: 2019-01-17 | Stop reason: SDUPTHER

## 2019-01-24 ENCOUNTER — IMAGING SERVICES (OUTPATIENT)
Dept: OTHER | Age: 64
End: 2019-01-24

## 2019-02-11 ENCOUNTER — IMAGING SERVICES (OUTPATIENT)
Dept: OTHER | Age: 64
End: 2019-02-11

## 2019-02-18 RX ORDER — FLUTICASONE PROPIONATE AND SALMETEROL 250; 50 UG/1; UG/1
POWDER RESPIRATORY (INHALATION)
COMMUNITY
Start: 2018-10-05 | End: 2019-05-07 | Stop reason: SDUPTHER

## 2019-02-18 RX ORDER — ALBUTEROL SULFATE 90 UG/1
AEROSOL, METERED RESPIRATORY (INHALATION)
COMMUNITY
Start: 2017-03-31 | End: 2020-07-17 | Stop reason: SDUPTHER

## 2019-02-18 RX ORDER — FLUTICASONE PROPIONATE 50 MCG
SPRAY, SUSPENSION (ML) NASAL
COMMUNITY
Start: 2018-08-01 | End: 2019-09-06 | Stop reason: SDUPTHER

## 2019-02-18 RX ORDER — LEVOTHYROXINE SODIUM 137 UG/1
TABLET ORAL
COMMUNITY
Start: 2018-11-09 | End: 2019-05-02 | Stop reason: SDUPTHER

## 2019-02-18 RX ORDER — OMEPRAZOLE 40 MG/1
CAPSULE, DELAYED RELEASE ORAL
COMMUNITY
Start: 2018-11-21 | End: 2019-08-25 | Stop reason: SDUPTHER

## 2019-02-18 RX ORDER — METRONIDAZOLE 10 MG/G
GEL TOPICAL
COMMUNITY
Start: 2017-05-09 | End: 2019-04-18 | Stop reason: SDUPTHER

## 2019-02-18 RX ORDER — ATORVASTATIN CALCIUM 40 MG/1
TABLET, FILM COATED ORAL
COMMUNITY
Start: 2018-11-09 | End: 2019-05-02 | Stop reason: SDUPTHER

## 2019-02-18 RX ORDER — CLOBETASOL PROPIONATE 0.5 MG/G
OINTMENT TOPICAL
COMMUNITY
Start: 2018-11-15 | End: 2019-08-22 | Stop reason: SDUPTHER

## 2019-03-05 VITALS
WEIGHT: 235 LBS | HEART RATE: 80 BPM | BODY MASS INDEX: 36.88 KG/M2 | SYSTOLIC BLOOD PRESSURE: 134 MMHG | DIASTOLIC BLOOD PRESSURE: 80 MMHG | HEIGHT: 67 IN | TEMPERATURE: 99.4 F

## 2019-03-05 VITALS
BODY MASS INDEX: 37.61 KG/M2 | TEMPERATURE: 98.1 F | SYSTOLIC BLOOD PRESSURE: 144 MMHG | DIASTOLIC BLOOD PRESSURE: 88 MMHG | HEART RATE: 76 BPM | HEIGHT: 66 IN | WEIGHT: 234 LBS

## 2019-03-06 VITALS
BODY MASS INDEX: 36.41 KG/M2 | WEIGHT: 232 LBS | DIASTOLIC BLOOD PRESSURE: 74 MMHG | HEIGHT: 67 IN | SYSTOLIC BLOOD PRESSURE: 118 MMHG

## 2019-03-06 VITALS
DIASTOLIC BLOOD PRESSURE: 80 MMHG | HEART RATE: 66 BPM | BODY MASS INDEX: 36.1 KG/M2 | WEIGHT: 230 LBS | SYSTOLIC BLOOD PRESSURE: 142 MMHG | HEIGHT: 67 IN

## 2019-03-08 ENCOUNTER — E-ADVICE (OUTPATIENT)
Dept: FAMILY MEDICINE | Age: 64
End: 2019-03-08

## 2019-03-09 RX ORDER — VERAPAMIL HYDROCHLORIDE 240 MG/1
TABLET, FILM COATED, EXTENDED RELEASE ORAL
Qty: 90 TABLET | Refills: 0 | Status: SHIPPED | OUTPATIENT
Start: 2019-03-09 | End: 2019-06-06 | Stop reason: SDUPTHER

## 2019-04-05 ENCOUNTER — APPOINTMENT (OUTPATIENT)
Dept: ALLERGY | Age: 64
End: 2019-04-05

## 2019-04-09 RX ORDER — LOSARTAN POTASSIUM AND HYDROCHLOROTHIAZIDE 25; 100 MG/1; MG/1
TABLET ORAL
Qty: 90 TABLET | Refills: 0 | Status: SHIPPED | OUTPATIENT
Start: 2019-04-09 | End: 2019-07-05 | Stop reason: SDUPTHER

## 2019-04-19 ENCOUNTER — OFFICE VISIT (OUTPATIENT)
Dept: ALLERGY | Age: 64
End: 2019-04-19

## 2019-04-19 ENCOUNTER — OFFICE VISIT (OUTPATIENT)
Dept: FAMILY MEDICINE | Facility: CLINIC | Age: 64
End: 2019-04-19
Payer: COMMERCIAL

## 2019-04-19 VITALS
HEART RATE: 100 BPM | SYSTOLIC BLOOD PRESSURE: 124 MMHG | OXYGEN SATURATION: 97 % | WEIGHT: 293 LBS | RESPIRATION RATE: 22 BRPM | HEIGHT: 72 IN | BODY MASS INDEX: 39.68 KG/M2 | DIASTOLIC BLOOD PRESSURE: 80 MMHG | TEMPERATURE: 96.5 F

## 2019-04-19 VITALS
SYSTOLIC BLOOD PRESSURE: 140 MMHG | BODY MASS INDEX: 37.61 KG/M2 | HEIGHT: 66 IN | WEIGHT: 234 LBS | DIASTOLIC BLOOD PRESSURE: 88 MMHG | TEMPERATURE: 98.1 F | HEART RATE: 80 BPM

## 2019-04-19 DIAGNOSIS — K21.9 GASTROESOPHAGEAL REFLUX DISEASE, ESOPHAGITIS PRESENCE NOT SPECIFIED: ICD-10-CM

## 2019-04-19 DIAGNOSIS — Z01.818 PREOP GENERAL PHYSICAL EXAM: Primary | ICD-10-CM

## 2019-04-19 DIAGNOSIS — J45.30 MILD PERSISTENT ASTHMA WITHOUT COMPLICATION: Primary | ICD-10-CM

## 2019-04-19 DIAGNOSIS — M71.30 SYNOVIAL CYST: ICD-10-CM

## 2019-04-19 DIAGNOSIS — J30.1 ALLERGIC RHINITIS DUE TO POLLEN, UNSPECIFIED SEASONALITY: ICD-10-CM

## 2019-04-19 DIAGNOSIS — R05.9 COUGH: ICD-10-CM

## 2019-04-19 LAB
ERYTHROCYTE [DISTWIDTH] IN BLOOD BY AUTOMATED COUNT: 13.4 % (ref 10–15)
HCT VFR BLD AUTO: 42.9 % (ref 35–47)
HGB BLD-MCNC: 13.9 G/DL (ref 11.7–15.7)
MCH RBC QN AUTO: 30.7 PG (ref 26.5–33)
MCHC RBC AUTO-ENTMCNC: 32.4 G/DL (ref 31.5–36.5)
MCV RBC AUTO: 95 FL (ref 78–100)
PLATELET # BLD AUTO: 293 10E9/L (ref 150–450)
RBC # BLD AUTO: 4.53 10E12/L (ref 3.8–5.2)
WBC # BLD AUTO: 7.9 10E9/L (ref 4–11)

## 2019-04-19 PROCEDURE — 93000 ELECTROCARDIOGRAM COMPLETE: CPT | Performed by: NURSE PRACTITIONER

## 2019-04-19 PROCEDURE — 36415 COLL VENOUS BLD VENIPUNCTURE: CPT | Performed by: NURSE PRACTITIONER

## 2019-04-19 PROCEDURE — 94010 BREATHING CAPACITY TEST: CPT | Performed by: ALLERGY & IMMUNOLOGY

## 2019-04-19 PROCEDURE — 99214 OFFICE O/P EST MOD 30 MIN: CPT | Performed by: NURSE PRACTITIONER

## 2019-04-19 PROCEDURE — 99214 OFFICE O/P EST MOD 30 MIN: CPT | Performed by: ALLERGY & IMMUNOLOGY

## 2019-04-19 PROCEDURE — 85027 COMPLETE CBC AUTOMATED: CPT | Performed by: NURSE PRACTITIONER

## 2019-04-19 RX ORDER — OMEPRAZOLE 20 MG/1
20 TABLET, DELAYED RELEASE ORAL DAILY
Qty: 90 TABLET | Refills: 1 | COMMUNITY
Start: 2019-04-19 | End: 2024-09-13

## 2019-04-19 RX ORDER — METRONIDAZOLE 10 MG/G
GEL TOPICAL
Qty: 60 G | Refills: 4 | Status: SHIPPED | OUTPATIENT
Start: 2019-04-19 | End: 2021-06-14 | Stop reason: SDUPTHER

## 2019-04-19 ASSESSMENT — MIFFLIN-ST. JEOR: SCORE: 1991.47

## 2019-04-19 NOTE — PROGRESS NOTES
[address] [id number] [phone]  Dept: [phone]    PRE-OP EVALUATION:  Today's date: 2019    Ashli Rogers (: 1955) presents for pre-operative evaluation assessment as requested by Dr. Carpenter.  She requires evaluation and anesthesia risk assessment prior to undergoing surgery/procedure for treatment of ganglion cyst right 4th digit .    Fax number for surgical facility: [phone]  Primary Physician: Lakisha Wynne  Type of Anesthesia Anticipated: MAC    Patient has a Health Care Directive or Living Will:  YES     Preop Questions 2019   Who is doing your surgery? ryan carpenter   What are you having done? basal ganglion cyst on right ring finger removed   Date of Surgery/Procedure: 19   Facility or Hospital where procedure/surgery will be performed: [facility]      1.  Do you have a history of Heart attack, stroke, stent, coronary bypass surgery, or other heart surgery? No   2.  Do you ever have any pain or discomfort in your chest? No   3.  Do you have a history of  Heart Failure? No   4.   Are you troubled by shortness of breath when:  walking on a level surface, or up a slight hill, or at night? No   5.  Do you currently have a cold, bronchitis or other respiratory infection? UNKNOWN - maybe getting over a cold   6.  Do you have a cough, shortness of breath, or wheezing? No   7.  Do you sometimes get pains in the calves of your legs when you walk? No   8. Do you or anyone in your family have previous history of blood clots? No   9.  Do you or does anyone in your family have a serious bleeding problem such as prolonged bleeding following surgeries or cuts? No   10. Have you ever had problems with anemia or been told to take iron pills? No   11. Have you had any abnormal blood loss such as black, tarry or bloody stools, or abnormal vaginal bleeding? YES - ? diverticulitis   12. Have you ever had a blood transfusion?  No   13. Have you or any of your relatives ever had problems with anesthesia? No   14. Do you have sleep apnea, excessive snoring or daytime drowsiness? YES - ?sleep apnea   15. Do you have any prosthetic heart valves? No   16. Do you have prosthetic joints? YES - left knee, right hip   17. Is there any chance that you may be pregnant? No         HPI:     HPI related to upcoming procedure: The patient has had a cyst over the dorsal surface of the DIP joint of the right fourth  fourth finger for about 2 years.  Recently it became enlarged and painful.  About a week ago it spontaneously ruptured and drained.      See problem list for active medical problems.  Problems all longstanding and stable, except as noted/documented.  See ROS for pertinent symptoms related to these conditions.                                                                                                                                                          .    MEDICAL HISTORY:     Patient Active Problem List    Diagnosis Date Noted     Diverticulitis of colon 02/03/2017     Priority: Medium     Morbid obesity due to excess calories (H) 07/05/2016     Priority: Medium     Hiatal hernia 07/05/2016     Priority: Medium     Gastroesophageal reflux disease, esophagitis presence not specified 03/18/2016     Priority: Medium     ADD (attention deficit disorder) 03/03/2013     Priority: Medium     On vivanse and is being followed by a mental health specialist        Advanced directives, counseling/discussion 10/28/2011     Priority: Medium     Pt is going to take home the packet and is going to look at it.  ZHOU/MA       DJD (degenerative joint disease) of knee 05/24/2011     Priority: Medium     HYPERLIPIDEMIA LDL GOAL <160 10/31/2010     Priority: Medium     Allergic rhinitis 11/13/2006     Priority: Medium     Problem list name updated by automated process. Provider to review       Varicose veins of lower extremities with complications 09/28/2005      Priority: Medium     Problem list name updated by automated process. Provider to review       S/P total hip arthroplasty 04/04/2003     Priority: Medium      Past Medical History:   Diagnosis Date     Closed fracture of medial malleolus     Distal evulsion fracture of the medial malleoli     Diverticulitis of colon 2/3/2017     DJD (degenerative joint disease) 5/24/2011     Erythema nodosum      Generalized osteoarthrosis, unspecified site     Hips     S/P total hip arthroplasty 4/4/2003     Past Surgical History:   Procedure Laterality Date     C TOTAL HIP ARTHROPLASTY  4/26/04    Hip Replacement, Total     COLONOSCOPY N/A 5/11/2016    Procedure: COLONOSCOPY;  Surgeon: Ervin Johnston MD;  Location: [facility]     ESOPHAGOSCOPY, GASTROSCOPY, DUODENOSCOPY (EGD), COMBINED N/A 5/11/2016    Procedure: COMBINED ESOPHAGOSCOPY, GASTROSCOPY, DUODENOSCOPY (EGD), BIOPSY SINGLE OR MULTIPLE;  Surgeon: Ervin Johnston MD;  Location: [facility]     HC COLONOSCOPY W/WO BRUSH/WASH  1/11/2006     Current Outpatient Medications   Medication Sig Dispense Refill     CALCIUM-MAGNESIUM-ZINC PO Take 1 tablet by mouth daily Reported on 3/28/2017       Cholecalciferol (VITAMIN D3 PO) Take by mouth daily       omeprazole (PRILOSEC OTC) 20 MG EC tablet Take 1 tablet (20 mg) by mouth daily 90 tablet 1     ZYRTEC 10 MG OR TABS ONE TABLET DAILY 30 8     IBUPROFEN PO        lisdexamfetamine (VYVANSE) 30 MG capsule Take by mouth every morning       OTC products: as above    Allergies   Allergen Reactions     Cephalexin Monohydrate      keflex     Gluten Meal GI Disturbance     Warfarin Sodium      coumadin      Latex Allergy: NO    Social History     Tobacco Use     Smoking status: Never Smoker     Smokeless tobacco: Never Used   Substance Use Topics     Alcohol use: Yes     Comment: 2 drinks per month     History   Drug Use No       REVIEW OF SYSTEMS:   CONSTITUTIONAL: NEGATIVE for fever, chills, change in weight  INTEGUMENTARY/SKIN:  NEGATIVE for worrisome rashes, moles or lesions  EYES: NEGATIVE for vision changes or irritation  ENT/MOUTH: NEGATIVE for ear, mouth and throat problems  RESP: NEGATIVE for significant cough or SOB  BREAST: NEGATIVE for masses, tenderness or discharge  CV: NEGATIVE for chest pain, palpitations or peripheral edema  GI: POSITIVE for reflux : NEGATIVE for frequency, dysuria, or hematuria  MUSCULOSKELETAL: NEGATIVE for significant arthralgias or myalgia  NEURO: NEGATIVE for weakness, dizziness or paresthesias  ENDOCRINE: NEGATIVE for temperature intolerance, skin/hair changes  HEME: NEGATIVE for bleeding problems  PSYCHIATRIC: NEGATIVE for changes in mood or affect    EXAM:   /80   Pulse 100   Temp 96.5  F (35.8  C) (Temporal)   Resp 22   Ht 1.829 m (6')   Wt 133.9 kg (295 lb 4.8 oz)   LMP 01/19/2005   SpO2 97%   BMI 40.05 kg/m      GENERAL APPEARANCE: healthy, alert and no distress     EYES: EOMI, PERRL     HENT: ear canals and TM's normal and nose and mouth without ulcers or lesions     NECK: no adenopathy, no asymmetry, masses, or scars and thyroid normal to palpation     RESP: lungs clear to auscultation - no rales, rhonchi or wheezes     CV: regular rates and rhythm, normal S1 S2, no S3 or S4 and no murmur, click or rub     ABDOMEN:  soft, nontender, no HSM or masses and bowel sounds normal     MS: extremities normal- no gross deformities noted, no evidence of inflammation in joints, FROM in all extremities.     SKIN: Small nontender nodule over the DIP joint of the right fourth finger     NEURO: Normal strength and tone, sensory exam grossly normal, mentation intact and speech normal     PSYCH: mentation appears normal. and affect normal/bright     LYMPHATICS: No cervical adenopathy    DIAGNOSTICS:     EKG: sinus bradycardia, normal axis, normal intervals, no acute ST/T changes c/w ischemia, no LVH by voltage criteria  Labs Resulted Today:   Results for orders placed or performed in visit on  10/17/18   BETA STREP GROUP A R/O CULTURE   Result Value Ref Range    Specimen Description Throat     Culture Micro No beta hemolytic Streptococcus Group A isolated      Results for orders placed or performed in visit on 04/19/19   CBC with platelets   Result Value Ref Range    WBC 7.9 4.0 - 11.0 10e9/L    RBC Count 4.53 3.8 - 5.2 10e12/L    Hemoglobin 13.9 11.7 - 15.7 g/dL    Hematocrit 42.9 35.0 - 47.0 %    MCV 95 78 - 100 fl    MCH 30.7 26.5 - 33.0 pg    MCHC 32.4 31.5 - 36.5 g/dL    RDW 13.4 10.0 - 15.0 %    Platelet Count 293 150 - 450 10e9/L         IMPRESSION:   Reason for surgery/procedure: Surgical excision of synovial cyst  Diagnosis/reason for consult: No medical contraindication noted to proceeding as planned    The proposed surgical procedure is considered LOW risk.    REVISED CARDIAC RISK INDEX  The patient has the following serious cardiovascular risks for perioperative complications such as (MI, PE, VFib and 3  AV Block):  No serious cardiac risks  INTERPRETATION: 0 risks: Class I (very low risk - 0.4% complication rate)    The patient has the following additional risks for perioperative complications:  No identified additional risks      ICD-10-CM    1. Preop general physical exam Z01.818 CBC with platelets     EKG 12-lead complete w/read - Clinics   2. Synovial cyst M71.30    3. Gastroesophageal reflux disease, esophagitis presence not specified K21.9 omeprazole (PRILOSEC OTC) 20 MG EC tablet       RECOMMENDATIONS:     --Patient is to take all scheduled medications on the day of surgery EXCEPT for modifications listed below.    Anticoagulant or Antiplatelet Medication Use  NSAIDS: Ibuprofen (Motrin):         Stop one day prior to surgery        APPROVAL GIVEN to proceed with proposed procedure, without further diagnostic evaluation       Signed Electronically by: ADRIENNE Loya CNP    Copy of this evaluation report is provided to requesting physician.    [facility] Pre[facility]  Guidelines    Revised Cardiac Risk Index

## 2019-04-20 ENCOUNTER — E-ADVICE (OUTPATIENT)
Dept: OBGYN | Age: 64
End: 2019-04-20

## 2019-04-23 ENCOUNTER — E-ADVICE (OUTPATIENT)
Dept: OBGYN | Age: 64
End: 2019-04-23

## 2019-05-02 RX ORDER — FLUTICASONE PROPIONATE AND SALMETEROL 250; 50 UG/1; UG/1
1 POWDER RESPIRATORY (INHALATION) DAILY
Qty: 1 EACH | Refills: 5 | Status: SHIPPED | OUTPATIENT
Start: 2019-05-02 | End: 2020-05-14 | Stop reason: SDUPTHER

## 2019-05-02 RX ORDER — LEVOTHYROXINE SODIUM 137 UG/1
TABLET ORAL
Qty: 90 TABLET | Refills: 0 | Status: SHIPPED | OUTPATIENT
Start: 2019-05-02 | End: 2019-07-08 | Stop reason: SDUPTHER

## 2019-05-02 RX ORDER — ATORVASTATIN CALCIUM 40 MG/1
TABLET, FILM COATED ORAL
Qty: 90 TABLET | Refills: 0 | Status: SHIPPED | OUTPATIENT
Start: 2019-05-02 | End: 2019-07-08 | Stop reason: SDUPTHER

## 2019-05-06 ENCOUNTER — E-ADVICE (OUTPATIENT)
Dept: OBGYN | Age: 64
End: 2019-05-06

## 2019-05-06 ENCOUNTER — APPOINTMENT (OUTPATIENT)
Dept: OBGYN | Age: 64
End: 2019-05-06

## 2019-05-06 SDOH — HEALTH STABILITY: MENTAL HEALTH: HOW OFTEN DO YOU HAVE A DRINK CONTAINING ALCOHOL?: NEVER

## 2019-05-07 ENCOUNTER — OFFICE VISIT (OUTPATIENT)
Dept: FAMILY MEDICINE | Age: 64
End: 2019-05-07

## 2019-05-07 ENCOUNTER — E-ADVICE (OUTPATIENT)
Dept: FAMILY MEDICINE | Age: 64
End: 2019-05-07

## 2019-05-07 VITALS
BODY MASS INDEX: 36.88 KG/M2 | HEIGHT: 67 IN | RESPIRATION RATE: 16 BRPM | TEMPERATURE: 99.4 F | WEIGHT: 235 LBS | DIASTOLIC BLOOD PRESSURE: 88 MMHG | HEART RATE: 76 BPM | SYSTOLIC BLOOD PRESSURE: 142 MMHG

## 2019-05-07 DIAGNOSIS — I10 ESSENTIAL HYPERTENSION: Primary | ICD-10-CM

## 2019-05-07 DIAGNOSIS — Z11.59 NEED FOR HEPATITIS C SCREENING TEST: ICD-10-CM

## 2019-05-07 DIAGNOSIS — E03.9 HYPOTHYROIDISM, UNSPECIFIED TYPE: ICD-10-CM

## 2019-05-07 DIAGNOSIS — E78.5 DYSLIPIDEMIA: ICD-10-CM

## 2019-05-07 DIAGNOSIS — Z51.81 MEDICATION MONITORING ENCOUNTER: ICD-10-CM

## 2019-05-07 PROCEDURE — 99214 OFFICE O/P EST MOD 30 MIN: CPT | Performed by: FAMILY MEDICINE

## 2019-05-07 SDOH — HEALTH STABILITY: PHYSICAL HEALTH: ON AVERAGE, HOW MANY DAYS PER WEEK DO YOU ENGAGE IN MODERATE TO STRENUOUS EXERCISE (LIKE A BRISK WALK)?: 3 DAYS

## 2019-05-07 SDOH — HEALTH STABILITY: MENTAL HEALTH: HOW OFTEN DO YOU HAVE A DRINK CONTAINING ALCOHOL?: NEVER

## 2019-05-07 SDOH — HEALTH STABILITY: PHYSICAL HEALTH: ON AVERAGE, HOW MANY MINUTES DO YOU ENGAGE IN EXERCISE AT THIS LEVEL?: 30 MIN

## 2019-05-07 ASSESSMENT — PATIENT HEALTH QUESTIONNAIRE - PHQ9
1. LITTLE INTEREST OR PLEASURE IN DOING THINGS: NOT AT ALL
SUM OF ALL RESPONSES TO PHQ9 QUESTIONS 1 AND 2: 0
2. FEELING DOWN, DEPRESSED OR HOPELESS: NOT AT ALL
SUM OF ALL RESPONSES TO PHQ9 QUESTIONS 1 AND 2: 0

## 2019-05-08 ENCOUNTER — LAB SERVICES (OUTPATIENT)
Dept: LAB | Age: 64
End: 2019-05-08

## 2019-05-08 DIAGNOSIS — E78.5 DYSLIPIDEMIA: ICD-10-CM

## 2019-05-08 DIAGNOSIS — R73.9 HYPERGLYCEMIA: Primary | ICD-10-CM

## 2019-05-08 DIAGNOSIS — I10 ESSENTIAL HYPERTENSION: ICD-10-CM

## 2019-05-08 DIAGNOSIS — Z51.81 MEDICATION MONITORING ENCOUNTER: ICD-10-CM

## 2019-05-08 DIAGNOSIS — Z11.59 NEED FOR HEPATITIS C SCREENING TEST: ICD-10-CM

## 2019-05-08 DIAGNOSIS — E03.9 HYPOTHYROIDISM, UNSPECIFIED TYPE: ICD-10-CM

## 2019-05-08 LAB
BASOPHIL %: 0.3 % (ref 0–1.2)
BASOPHIL ABSOLUTE #: 0 10*3/UL (ref 0–0.1)
DIFFERENTIAL TYPE: ABNORMAL
EOSINOPHIL %: 2.1 % (ref 0–10)
EOSINOPHIL ABSOLUTE #: 0.2 10*3/UL (ref 0–0.5)
HEMATOCRIT: 39.8 % (ref 34–45)
HEMOGLOBIN: 12.8 G/DL (ref 11.2–15.7)
LYMPH PERCENT: 26.9 % (ref 20.5–51.1)
LYMPHOCYTE ABSOLUTE #: 1.9 10*3/UL (ref 1.2–3.4)
MEAN CORPUSCULAR HGB CONCENTRATION: 32.2 % (ref 32–36)
MEAN CORPUSCULAR HGB: 30.6 PG (ref 27–34)
MEAN CORPUSCULAR VOLUME: 95.2 FL (ref 79–95)
MEAN PLATELET VOLUME: 12 FL (ref 8.6–12.4)
MONOCYTE ABSOLUTE #: 0.7 10*3/UL (ref 0.2–0.9)
MONOCYTE PERCENT: 10.6 % (ref 4.3–12.9)
NEUTROPHIL ABSOLUTE #: 4.2 10*3/UL (ref 1.4–6.5)
NEUTROPHIL PERCENT: 60.1 % (ref 34–73.5)
PLATELET COUNT: 244 10*3/UL (ref 150–400)
RED BLOOD CELL COUNT: 4.18 10*6/UL (ref 3.7–5.2)
RED CELL DISTRIBUTION WIDTH: 14.3 % (ref 11.3–14.8)
WHITE BLOOD CELL COUNT: 7 10*3/UL (ref 4–10)

## 2019-05-08 PROCEDURE — 80053 COMPREHEN METABOLIC PANEL: CPT | Performed by: FAMILY MEDICINE

## 2019-05-08 PROCEDURE — 84443 ASSAY THYROID STIM HORMONE: CPT | Performed by: FAMILY MEDICINE

## 2019-05-08 PROCEDURE — 82043 UR ALBUMIN QUANTITATIVE: CPT | Performed by: FAMILY MEDICINE

## 2019-05-08 PROCEDURE — 85025 COMPLETE CBC W/AUTO DIFF WBC: CPT | Performed by: FAMILY MEDICINE

## 2019-05-08 PROCEDURE — 36415 COLL VENOUS BLD VENIPUNCTURE: CPT | Performed by: FAMILY MEDICINE

## 2019-05-08 PROCEDURE — 82570 ASSAY OF URINE CREATININE: CPT | Performed by: FAMILY MEDICINE

## 2019-05-08 PROCEDURE — 80061 LIPID PANEL: CPT | Performed by: FAMILY MEDICINE

## 2019-05-09 LAB
ALBUMIN SERPL-MCNC: 4 G/DL (ref 3.6–5.1)
ALBUMIN/CREAT UR: 12 MG/G (ref 0–30)
ALP SERPL-CCNC: 85 U/L (ref 45–130)
ALT SERPL W/O P-5'-P-CCNC: 43 U/L (ref 4–38)
AST SERPL-CCNC: 34 U/L (ref 14–43)
BILIRUB SERPL-MCNC: 1.1 MG/DL (ref 0–1.3)
BUN SERPL-MCNC: 17 MG/DL (ref 7–20)
CALCIUM SERPL-MCNC: 10.2 MG/DL (ref 8.6–10.6)
CHLORIDE SERPL-SCNC: 106 MMOL/L (ref 96–107)
CHOLEST SERPL-MCNC: 152 MG/DL (ref 140–200)
CO2 SERPL-SCNC: 30 MMOL/L (ref 22–32)
CREAT SERPL-MCNC: 0.8 MG/DL (ref 0.5–1.4)
CREAT UR-MCNC: 77.1 MG/DL
GFR SERPL CREATININE-BSD FRML MDRD: >60 ML/MIN/{1.73M2}
GFR SERPL CREATININE-BSD FRML MDRD: >60 ML/MIN/{1.73M2}
GLUCOSE P FAST SERPL-MCNC: 97 MG/DL (ref 60–100)
HCV AB SER QL: NEGATIVE
HDLC SERPL-MCNC: 61 MG/DL
LDLC SERPL CALC-MCNC: 76 MG/DL (ref 30–100)
MICROALBUMIN UR-MCNC: 9.3 MG/L
POTASSIUM SERPL-SCNC: 4.3 MMOL/L (ref 3.5–5.3)
PROT SERPL-MCNC: 7.4 G/DL (ref 6.4–8.5)
SODIUM SERPL-SCNC: 143 MMOL/L (ref 136–146)
TRIGL SERPL-MCNC: 73 MG/DL (ref 0–200)
TSH SERPL DL<=0.05 MIU/L-ACNC: 0.73 M[IU]/L (ref 0.3–4.82)

## 2019-06-06 RX ORDER — VERAPAMIL HYDROCHLORIDE 240 MG/1
TABLET, FILM COATED, EXTENDED RELEASE ORAL
Qty: 90 TABLET | Refills: 0 | Status: SHIPPED | OUTPATIENT
Start: 2019-06-06 | End: 2019-09-01 | Stop reason: SDUPTHER

## 2019-06-07 RX ORDER — VERAPAMIL HYDROCHLORIDE 240 MG/1
TABLET, FILM COATED, EXTENDED RELEASE ORAL
Qty: 90 TABLET | Refills: 0 | OUTPATIENT
Start: 2019-06-07

## 2019-06-11 ENCOUNTER — E-ADVICE (OUTPATIENT)
Dept: FAMILY MEDICINE | Age: 64
End: 2019-06-11

## 2019-06-11 ENCOUNTER — TELEPHONE (OUTPATIENT)
Dept: FAMILY MEDICINE | Facility: CLINIC | Age: 64
End: 2019-06-11

## 2019-06-11 ENCOUNTER — MYC MEDICAL ADVICE (OUTPATIENT)
Dept: FAMILY MEDICINE | Facility: CLINIC | Age: 64
End: 2019-06-11

## 2019-06-11 NOTE — LETTER
[address] [id number] [phone]        Ashli Rogers  [address]  [facility] [address]      June 26, 2019      Dear Ashli,    I care about your health and have reviewed your health plan, including your medical conditions, medication list, and lab results and am making recommendations based on this review, to better manage your health.    You are in particular need of attention regarding:  -Breast Cancer Screening  -Wellness (Physical) Visit     I am recommending that you:  -schedule a WELLNESS (Physical) APPOINTMENT with me.   I will check fasting labs the same day - nothing to eat except water and meds for 8-10 hours prior.  -schedule a MAMMOGRAM which is due. You can call  [phone] to schedule your mammogram.      If you've had the preventative screening completed at another facility or feel you're not due for this screening, please call our clinic at the number listed above or send us a My Chart message so we can update our records. We would like to thank you in advance for taking the time to take care of your health.  If you have any questions, please don t hesitate to contact our clinic.    Sincerely,       Your [facility] Team

## 2019-06-11 NOTE — TELEPHONE ENCOUNTER
Panel Management Review      Patient has the following on her problem list: None      Composite cancer screening  Chart review shows that this patient is due/due soon for the following Mammogram  Summary:    Patient is due/failing the following:   MAMMOGRAM and PHYSICAL    Action needed:   Patient needs office visit for physical, mammogram.    Type of outreach:    Sent [employer] message.    Questions for provider review:    None                                                                                                                                    ACase/MA       Chart routed to  .

## 2019-06-24 ENCOUNTER — APPOINTMENT (OUTPATIENT)
Dept: MAMMOGRAPHY | Age: 64
End: 2019-06-24

## 2019-07-05 RX ORDER — LOSARTAN POTASSIUM AND HYDROCHLOROTHIAZIDE 25; 100 MG/1; MG/1
TABLET ORAL
Qty: 90 TABLET | Refills: 0 | Status: SHIPPED | OUTPATIENT
Start: 2019-07-05 | End: 2019-10-10 | Stop reason: SDUPTHER

## 2019-07-06 ENCOUNTER — E-ADVICE (OUTPATIENT)
Dept: OBGYN | Age: 64
End: 2019-07-06

## 2019-07-08 ENCOUNTER — OFFICE VISIT (OUTPATIENT)
Dept: FAMILY MEDICINE | Age: 64
End: 2019-07-08

## 2019-07-08 VITALS
TEMPERATURE: 99.3 F | HEIGHT: 67 IN | DIASTOLIC BLOOD PRESSURE: 86 MMHG | HEART RATE: 68 BPM | SYSTOLIC BLOOD PRESSURE: 138 MMHG | WEIGHT: 236 LBS | BODY MASS INDEX: 37.04 KG/M2

## 2019-07-08 DIAGNOSIS — M17.0 PRIMARY OSTEOARTHRITIS OF BOTH KNEES: ICD-10-CM

## 2019-07-08 DIAGNOSIS — I10 ESSENTIAL HYPERTENSION: Primary | ICD-10-CM

## 2019-07-08 DIAGNOSIS — E03.9 HYPOTHYROIDISM, UNSPECIFIED TYPE: ICD-10-CM

## 2019-07-08 DIAGNOSIS — Z00.00 PREVENTATIVE HEALTH CARE: ICD-10-CM

## 2019-07-08 DIAGNOSIS — E78.5 DYSLIPIDEMIA: ICD-10-CM

## 2019-07-08 PROCEDURE — G0439 PPPS, SUBSEQ VISIT: HCPCS | Performed by: FAMILY MEDICINE

## 2019-07-08 PROCEDURE — 99213 OFFICE O/P EST LOW 20 MIN: CPT | Performed by: FAMILY MEDICINE

## 2019-07-08 RX ORDER — LEVOTHYROXINE SODIUM 137 UG/1
137 TABLET ORAL DAILY
Qty: 90 TABLET | Refills: 1 | Status: SHIPPED | OUTPATIENT
Start: 2019-07-08 | End: 2020-02-01 | Stop reason: SDUPTHER

## 2019-07-08 RX ORDER — ATORVASTATIN CALCIUM 40 MG/1
40 TABLET, FILM COATED ORAL DAILY
Qty: 90 TABLET | Refills: 1 | Status: SHIPPED | OUTPATIENT
Start: 2019-07-08 | End: 2020-01-20 | Stop reason: SDUPTHER

## 2019-07-08 ASSESSMENT — COGNITIVE AND FUNCTIONAL STATUS - GENERAL
DO YOU HAVE DIFFICULTY DRESSING OR BATHING: NO
BECAUSE OF A PHYSICAL, MENTAL, OR EMOTIONAL CONDITION, DO YOU HAVE SERIOUS DIFFICULTY CONCENTRATING, REMEMBERING OR MAKING DECISIONS: NO
ARE YOU BLIND OR DO YOU HAVE SERIOUS DIFFICULTY SEEING, EVEN WHEN WEARING GLASSES: NO
ARE YOU DEAF OR DO YOU HAVE SERIOUS DIFFICULTY  HEARING: NO
BECAUSE OF A PHYSICAL, MENTAL, OR EMOTIONAL CONDITION, DO YOU HAVE DIFFICULTY DOING ERRANDS ALONE: NO
DO YOU HAVE SERIOUS DIFFICULTY WALKING OR CLIMBING STAIRS: NO

## 2019-07-08 ASSESSMENT — PATIENT HEALTH QUESTIONNAIRE - PHQ9
2. FEELING DOWN, DEPRESSED OR HOPELESS: NOT AT ALL
SUM OF ALL RESPONSES TO PHQ9 QUESTIONS 1 AND 2: 0
SUM OF ALL RESPONSES TO PHQ9 QUESTIONS 1 AND 2: 0
1. LITTLE INTEREST OR PLEASURE IN DOING THINGS: NOT AT ALL

## 2019-07-08 ASSESSMENT — ACTIVITIES OF DAILY LIVING (ADL)
ADL_SHORT_OF_BREATH: NO
ADL_SCORE: 12
RECENT_DECLINE_ADL: NO
SENSORY_SUPPORT_DEVICES: EYEGLASSES
ADL_BEFORE_ADMISSION: INDEPENDENT
NEEDS_ASSIST: NO

## 2019-07-09 ENCOUNTER — IMAGING SERVICES (OUTPATIENT)
Dept: MAMMOGRAPHY | Age: 64
End: 2019-07-09
Attending: FAMILY MEDICINE

## 2019-07-09 DIAGNOSIS — Z12.31 VISIT FOR SCREENING MAMMOGRAM: ICD-10-CM

## 2019-07-09 PROCEDURE — 77067 SCR MAMMO BI INCL CAD: CPT | Performed by: RADIOLOGY

## 2019-07-09 PROCEDURE — 77063 BREAST TOMOSYNTHESIS BI: CPT | Performed by: RADIOLOGY

## 2019-07-11 RX ORDER — LOSARTAN POTASSIUM AND HYDROCHLOROTHIAZIDE 25; 100 MG/1; MG/1
TABLET ORAL
Qty: 90 TABLET | Refills: 0 | OUTPATIENT
Start: 2019-07-11

## 2019-07-13 ASSESSMENT — COGNITIVE AND FUNCTIONAL STATUS - GENERAL
ARE YOU BLIND OR DO YOU HAVE SERIOUS DIFFICULTY SEEING, EVEN WHEN WEARING GLASSES: NO
DO YOU HAVE SERIOUS DIFFICULTY WALKING OR CLIMBING STAIRS: NO
DO YOU HAVE DIFFICULTY DRESSING OR BATHING: NO
BECAUSE OF A PHYSICAL, MENTAL, OR EMOTIONAL CONDITION, DO YOU HAVE SERIOUS DIFFICULTY CONCENTRATING, REMEMBERING OR MAKING DECISIONS: NO
ARE YOU DEAF OR DO YOU HAVE SERIOUS DIFFICULTY  HEARING: NO
BECAUSE OF A PHYSICAL, MENTAL, OR EMOTIONAL CONDITION, DO YOU HAVE DIFFICULTY DOING ERRANDS ALONE: NO

## 2019-07-13 ASSESSMENT — ACTIVITIES OF DAILY LIVING (ADL)
ADL_SHORT_OF_BREATH: NO
RECENT_DECLINE_ADL: NO
NEEDS_ASSIST: NO
ADL_BEFORE_ADMISSION: INDEPENDENT
ADL_SCORE: 12

## 2019-07-15 ENCOUNTER — APPOINTMENT (OUTPATIENT)
Dept: OBGYN | Age: 64
End: 2019-07-15

## 2019-08-01 ENCOUNTER — OFFICE VISIT (OUTPATIENT)
Dept: FAMILY MEDICINE | Facility: CLINIC | Age: 64
End: 2019-08-01
Payer: COMMERCIAL

## 2019-08-01 VITALS
BODY MASS INDEX: 40.7 KG/M2 | TEMPERATURE: 96.7 F | OXYGEN SATURATION: 97 % | SYSTOLIC BLOOD PRESSURE: 126 MMHG | HEART RATE: 96 BPM | RESPIRATION RATE: 22 BRPM | WEIGHT: 293 LBS | DIASTOLIC BLOOD PRESSURE: 76 MMHG

## 2019-08-01 DIAGNOSIS — R06.83 SNORING: Primary | ICD-10-CM

## 2019-08-01 DIAGNOSIS — Z11.59 NEED FOR HEPATITIS C SCREENING TEST: ICD-10-CM

## 2019-08-01 DIAGNOSIS — Z13.6 CARDIOVASCULAR SCREENING; LDL GOAL LESS THAN 130: ICD-10-CM

## 2019-08-01 DIAGNOSIS — Z23 PNEUMOCOCCAL VACCINE ADMINISTERED: ICD-10-CM

## 2019-08-01 DIAGNOSIS — M21.612 BUNION, LEFT: ICD-10-CM

## 2019-08-01 DIAGNOSIS — I83.813 PAIN DUE TO VARICOSE VEINS OF BOTH LOWER EXTREMITIES: ICD-10-CM

## 2019-08-01 LAB
CHOLEST SERPL-MCNC: 247 MG/DL
HDLC SERPL-MCNC: 70 MG/DL
LDLC SERPL CALC-MCNC: 157 MG/DL
NONHDLC SERPL-MCNC: 177 MG/DL
TRIGL SERPL-MCNC: 101 MG/DL

## 2019-08-01 PROCEDURE — 36415 COLL VENOUS BLD VENIPUNCTURE: CPT | Performed by: NURSE PRACTITIONER

## 2019-08-01 PROCEDURE — 90670 PCV13 VACCINE IM: CPT | Performed by: NURSE PRACTITIONER

## 2019-08-01 PROCEDURE — 80061 LIPID PANEL: CPT | Performed by: NURSE PRACTITIONER

## 2019-08-01 PROCEDURE — 90471 IMMUNIZATION ADMIN: CPT | Performed by: NURSE PRACTITIONER

## 2019-08-01 PROCEDURE — 86803 HEPATITIS C AB TEST: CPT | Performed by: NURSE PRACTITIONER

## 2019-08-01 PROCEDURE — 99214 OFFICE O/P EST MOD 30 MIN: CPT | Mod: 25 | Performed by: NURSE PRACTITIONER

## 2019-08-01 ASSESSMENT — PAIN SCALES - GENERAL: PAINLEVEL: NO PAIN (0)

## 2019-08-01 NOTE — PROGRESS NOTES
Subjective     Ashli Rogers is a 64 year old female who presents to clinic today for the following health issues:    HPI   Would like to discuss a referral for sleep apnea.  She states her  has told her for years that she snores very loudly and that at times she is holding her breath during sleep.    She also is concerned about chronic pain in both legs.  She states they tend to ache, this is worse when she is more active.  She has extensive varicosities in both legs.    She has developed more pain in the left foot due to a bunion.  She is also noted some bruising across the forefoot at the base of her toes, is wondering if perhaps this could be related.    She is overdue for mammogram and states she is going to schedule that  She is going to have some lab work done today that is overdue for routine health maintenance  She is needing immunizations.  We will get her Pneumovax today in clinic and will go to an outside pharmacy for her shingles immunization      BP Readings from Last 3 Encounters:   08/01/19 126/76   04/19/19 124/80   10/17/18 125/74    Wt Readings from Last 3 Encounters:   08/01/19 136.1 kg (300 lb 1.6 oz)   04/19/19 133.9 kg (295 lb 4.8 oz)   02/07/18 124.7 kg (275 lb)                    Reviewed and updated as needed this visit by Provider  Tobacco  Allergies  Meds  Problems  Med Hx  Surg Hx  Fam Hx         Review of Systems   ROS COMP: Constitutional, HEENT, cardiovascular, pulmonary, gi and gu systems are negative, except as otherwise noted.      Objective    /76   Pulse 96   Temp 96.7  F (35.9  C) (Temporal)   Resp 22   Wt 136.1 kg (300 lb 1.6 oz)   LMP 01/19/2005   SpO2 97%   BMI 40.70 kg/m    Body mass index is 40.7 kg/m .  Physical Exam   GENERAL: healthy, alert and no distress  NECK: no adenopathy, no asymmetry, masses, or scars and thyroid normal to palpation  RESP: lungs clear to auscultation - no rales, rhonchi or wheezes  CV: regular rate and rhythm, normal S1  S2, no S3 or S4, no murmur, click or rub, no peripheral edema and peripheral pulses strong  MS: Focused exam of left foot: She has significant medial deviation of the first metatarsal with prominent bunion.  A little bruising discoloration at the base of the toes over the MCP joints.  These are not tender.  No pedal edema.  Pedal pulses palpated.  EXTREMITIES: Multiple tortuous varicosities of both lower extremities from groin down to the ankles.  More superficial varicosities noted around the distal tibial area and into the ankles.            Assessment & Plan     1. Snoring  - SLEEP EVALUATION & MANAGEMENT REFERRAL - ADULT -[facility] [phone] (Age 13 and up if over 100 lbs); Future    2. Bunion, left  - PODIATRY/FOOT & ANKLE SURGERY REFERRAL    3. Pain due to varicose veins of both lower extremities  - US Venous Competency Bilateral; Future  - GENERAL SURG ADULT REFERRAL    4. Need for hepatitis C screening test  - Hepatitis C Screen Reflex to HCV RNA Quant and Genotype    5. CARDIOVASCULAR SCREENING; LDL GOAL LESS THAN 130  - Lipid panel reflex to direct LDL Non-fasting    6. Pneumococcal vaccine administered  - VACCINE ADMINISTRATION, INITIAL  - PNEUMOCOCCAL CONJ VACCINE 13 VALENT IM     BMI:   Estimated body mass index is 40.7 kg/m  as calculated from the following:    Height as of 4/19/19: 1.829 m (6').    Weight as of this encounter: 136.1 kg (300 lb 1.6 oz).   Weight management plan: Discussed healthy diet and exercise guidelines            Return for Appointments have been scheduled for Doppler studies of lower extremities and referral to surgeon.  . She has been referred to sleep clinic and is aware they will contact her to schedule an appointment.  Appointment scheduled with  podiatry    ADRIENNE Loya [facility]    Screening Questionnaire for Adult Immunization    Are you sick today?   No   Do you have allergies to medications, food, a vaccine  component or latex?   Yes   Have you ever had a serious reaction after receiving a vaccination?   No   Do you have a long-term health problem with heart disease, lung disease, asthma, kidney disease, metabolic disease (e.g. diabetes), anemia, or other blood disorder?   No   Do you have cancer, leukemia, HIV/AIDS, or any other immune system problem?   No   In the past 3 months, have you taken medications that affect  your immune system, such as prednisone, other steroids, or anticancer drugs; drugs for the treatment of rheumatoid arthritis, Crohn s disease, or psoriasis; or have you had radiation treatments?   No   Have you had a seizure, or a brain or other nervous system problem?   No   During the past year, have you received a transfusion of blood or blood     products, or been given immune (gamma) globulin or antiviral drug?   No   For women: Are you pregnant or is there a chance you could become        pregnant during the next month?   No   Have you received any vaccinations in the past 4 weeks?   No     Immunization questionnaire was positive for at least one answer.  Notified provider.        Per orders of Lakisha Wynne, injection of PCV 13 given by Dennise Bullock. Patient instructed to remain in clinic for 15 minutes afterwards, and to report any adverse reaction to me immediately.       Screening performed by Dennise Bullock on 8/1/2019 at 9:57 AM.  Verified patient's name and date of birth to confirm prior to injection. Instructed patient to remain in clinic 20 minutes following injection, in case allergic reaction occurs seek medical staff. A Case MA

## 2019-08-02 LAB — HCV AB SERPL QL IA: NONREACTIVE

## 2019-08-05 ENCOUNTER — ANCILLARY PROCEDURE (OUTPATIENT)
Dept: GENERAL RADIOLOGY | Facility: CLINIC | Age: 64
End: 2019-08-05
Attending: PODIATRIST
Payer: COMMERCIAL

## 2019-08-05 ENCOUNTER — OFFICE VISIT (OUTPATIENT)
Dept: PODIATRY | Facility: CLINIC | Age: 64
End: 2019-08-05
Payer: COMMERCIAL

## 2019-08-05 VITALS
BODY MASS INDEX: 39.68 KG/M2 | DIASTOLIC BLOOD PRESSURE: 92 MMHG | WEIGHT: 293 LBS | SYSTOLIC BLOOD PRESSURE: 130 MMHG | HEIGHT: 72 IN

## 2019-08-05 DIAGNOSIS — I87.8 VENOUS STASIS OF LOWER EXTREMITY: ICD-10-CM

## 2019-08-05 DIAGNOSIS — M20.12 ACQUIRED HALLUX VALGUS OF LEFT FOOT: Primary | ICD-10-CM

## 2019-08-05 DIAGNOSIS — R60.0 EDEMA EXTREMITIES: ICD-10-CM

## 2019-08-05 DIAGNOSIS — I83.893 VARICOSE VEINS OF BOTH LEGS WITH EDEMA: ICD-10-CM

## 2019-08-05 DIAGNOSIS — M20.12 ACQUIRED HALLUX VALGUS OF LEFT FOOT: ICD-10-CM

## 2019-08-05 PROCEDURE — 99213 OFFICE O/P EST LOW 20 MIN: CPT | Performed by: PODIATRIST

## 2019-08-05 PROCEDURE — 73630 X-RAY EXAM OF FOOT: CPT | Mod: TC

## 2019-08-05 ASSESSMENT — MIFFLIN-ST. JEOR: SCORE: 2013.24

## 2019-08-05 ASSESSMENT — PAIN SCALES - GENERAL: PAINLEVEL: NO PAIN (1)

## 2019-08-05 NOTE — PATIENT INSTRUCTIONS
Continue compression and follow-up as needed.    Reliable shoe stores: To maximize your experience and provide the best possible fit.  Be sure to show them your foot concerns and tell them Dr. Jang sent you.      Stores listed in bold have only athletic shoes, and stores that are not bold are mostly casual or variety of shoes    [employer]  [address] [place] [address]  [phone]     [employer] [place]  [address] [place] [address]  [phone]     [employer] [place]  [address] [place] [address]  [phone]    [employer]  [address] [facility]  [place] [address]  [phone]    [employer]  [address] [place] [address]  [phone]    [employer]  [address] [place] [address]  [phone]                         [employer] Locations:     [address] [place] [address]   [phone]     [address] [place] [address] [address] [place] [address]   [phone]     [address] [place] [address]   [phone]     [address] [place] [address]   [phone]     [address] [place] [address] [place] [address]   [phone]     [address] [place] [address]   [phone]     [address] [facility] [address]   [place] [address]   [phone]     [address] [facility]. [address]   [place], [place] [id number] [phone]             How to find reasonable shoes          The correct width    Correct Fitting    Correct Length      Foot Distortion    Posture Distortion                          Torsional Rigidity      Grasp behind the heel and underneath the foot and twist      Bad    Excessive torsion/twist in midfoot     Less torsion/twist in midfoot is better                   Heel Counter Rigidity      Grasp just above   midsole and squeeze      Bad    Soft heel counter      Good    Rigid Heel Counter      Flexion Rigidity      Grasp shoe and bend from forefoot to rearfoot

## 2019-08-05 NOTE — LETTER
8/5/2019         RE: Ashli Rogers  [address]  [facility] [address]        Dear Colleague,    Thank you for referring your patient, Ashli Rogers, to the [facility]. Please see a copy of my visit note below.    HPI:  Ashli Rogers is a 64 year old female who is seen in consultation at the request of self.    Pt presents for eval of:   (Onset, Location, L/R, Character, Treatments, Injury if yes)    XR Left foot today 8/5/2019     Ongoing, medial Left bunion pain. No injury noted. Presents today with supportive sandals.  Intermittent, sharp, stabbing, throbbing, bruising pain 1-4 with a dull ache that radiates to lower leg. Varicose veins.  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    Weight management plan: Patient was referred to their PCP to discuss a diet and exercise plan.     Ashli to follow up with Primary Care provider regarding elevated blood pressure.    Patient to follow up with Primary Care provider regarding elevated blood pressure.    ROS:  10 point ROS neg other than the symptoms noted above in the HPI.    Patient Active Problem List   Diagnosis     S/P total hip arthroplasty     Varicose veins of lower extremities with complications     Allergic rhinitis     DJD (degenerative joint disease) of knee     Advanced directives, counseling/discussion     ADD (attention deficit disorder)     Gastroesophageal reflux disease, esophagitis presence not specified     Morbid obesity due to excess calories (H)     Hiatal hernia     Diverticulitis of colon     Bunion, left     Pain due to varicose veins of both lower extremities       PAST MEDICAL HISTORY:   Past Medical History:   Diagnosis Date     Closed fracture of medial malleolus     Distal evulsion fracture of the medial malleoli     Diverticulitis of colon 2/3/2017     DJD (degenerative joint disease) 5/24/2011     Erythema nodosum       Generalized osteoarthrosis, unspecified site     Hips     S/P total hip arthroplasty 4/4/2003        PAST SURGICAL HISTORY:   Past Surgical History:   Procedure Laterality Date     C TOTAL HIP ARTHROPLASTY  4/26/04    Hip Replacement, Total     COLONOSCOPY N/A 5/11/2016    Procedure: COLONOSCOPY;  Surgeon: Ervin Johnston MD;  Location: [facility]     ESOPHAGOSCOPY, GASTROSCOPY, DUODENOSCOPY (EGD), COMBINED N/A 5/11/2016    Procedure: COMBINED ESOPHAGOSCOPY, GASTROSCOPY, DUODENOSCOPY (EGD), BIOPSY SINGLE OR MULTIPLE;  Surgeon: Ervin Johnston MD;  Location: [facility]     HC COLONOSCOPY W/WO BRUSH/WASH  1/11/2006        MEDICATIONS:   Current Outpatient Medications:      CALCIUM-MAGNESIUM-ZINC PO, Take 1 tablet by mouth daily Reported on 3/28/2017, Disp: , Rfl:      Cholecalciferol (VITAMIN D3 PO), Take by mouth daily, Disp: , Rfl:      IBUPROFEN PO, , Disp: , Rfl:      lisdexamfetamine (VYVANSE) 30 MG capsule, Take by mouth every morning, Disp: , Rfl:      omeprazole (PRILOSEC OTC) 20 MG EC tablet, Take 1 tablet (20 mg) by mouth daily, Disp: 90 tablet, Rfl: 1     ZYRTEC 10 MG OR TABS, ONE TABLET DAILY, Disp: 30, Rfl: 8     ALLERGIES:    Allergies   Allergen Reactions     Cephalexin Monohydrate      keflex     Gluten Meal GI Disturbance     Warfarin Sodium      coumadin        SOCIAL HISTORY:   Social History     Socioeconomic History     Marital status:      Spouse name: Osbaldo     Number of children: 2     Years of education: Not on file     Highest education level: Not on file   Occupational History     Not on file   Social Needs     Financial resource strain: Not on file     Food insecurity:     Worry: Not on file     Inability: Not on file     Transportation needs:     Medical: Not on file     Non-medical: Not on file   Tobacco Use     Smoking status: Never Smoker     Smokeless tobacco: Never Used   Substance and Sexual Activity     Alcohol use: Yes     Comment: 2 drinks per month     Drug use: No      Sexual activity: Yes     Partners: Male   Lifestyle     Physical activity:     Days per week: Not on file     Minutes per session: Not on file     Stress: Not on file   Relationships     Social connections:     Talks on phone: Not on file     Gets together: Not on file     Attends Worship service: Not on file     Active member of club or organization: Not on file     Attends meetings of clubs or organizations: Not on file     Relationship status: Not on file     Intimate partner violence:     Fear of current or ex partner: Not on file     Emotionally abused: Not on file     Physically abused: Not on file     Forced sexual activity: Not on file   Other Topics Concern      Service Not Asked     Blood Transfusions Not Asked     Caffeine Concern Not Asked     Occupational Exposure Not Asked     Hobby Hazards Not Asked     Sleep Concern Not Asked     Stress Concern Not Asked     Weight Concern Yes     Comment: going to lose 100 pounds     Special Diet Not Asked     Back Care Not Asked     Exercise Not Asked     Bike Helmet Not Asked     Seat Belt Not Asked     Self-Exams Not Asked     Parent/sibling w/ CABG, MI or angioplasty before 65F 55M? Not Asked   Social History Narrative     Not on file        FAMILY HISTORY:   Family History   Problem Relation Age of Onset     Cancer Father          of lung cancer     Heart Disease Daughter         2 holes in her heart        EXAM:Vitals: BP (!) 130/92 (BP Location: Left arm, Cuff Size: Adult Large)   Ht 1.829 m (6')   Wt 136.1 kg (300 lb 1.6 oz)   LMP 2005   BMI 40.70 kg/m     BMI= Body mass index is 40.7 kg/m .    General appearance: Patient is alert and fully cooperative with history & exam.  No sign of distress is noted during the visit.     Psychiatric: Affect is pleasant & appropriate.  Patient appears motivated to improve health.     Respiratory: Breathing is regular & unlabored while sitting.     HEENT: Hearing is intact to spoken word.  Speech is  clear.  No gross evidence of visual impairment that would impact ambulation.     Vascular: DP & PT pulses are intact & regular bilaterally.  No significant edema or varicosities noted.  CFT and skin temperature is normal to both lower extremities.     Neurologic: Lower extremity sensation is intact to light touch.  No evidence of weakness or contracture in the lower extremities.  No evidence of neuropathy.    Dermatologic: Skin is intact to both lower extremities with adequate texture, turgor and tone about the integument.  No paronychia or evidence of soft tissue infection is noted.     Musculoskeletal: Patient is ambulatory without assistive device or brace.  Multiple varicosities are noted and pitting edema noted bilateral less than 1 cm.  No pinpoint area of discomfort throughout her calf.  No erythema heat and edema and symptoms are bilateral.  She does have a bilateral bunion deformity left greater than right.  Approximately 20 or 30 degrees total range of motion with tracking and not fully reducible.    Radiographs demonstrate elevated intermetatarsal angle HAV angle with narrowing of the first MPJ joint space surrounding sclerotic changes.  Osteophytes noted about the lateral joint and dorsally as well.    ASSESSMENT:       ICD-10-CM    1. Acquired hallux valgus of left foot M20.12 XR Foot Left G/E 3 Views   2. Venous stasis of lower extremity I87.8    3. Varicose veins of both legs with edema I83.893    4. Edema extremities R60.0         PLAN:  Reviewed patient's chart in [facility].      8/5/2019   compression therapy recommended to reduce venous disease.    She notes that she has compression stockings.  We discussed typical life replacement is about every 6 months.  She notes she has f/u with Paramjit to further discuss other options regarding her varicosities.  recommend weight loss.  Motivational interviewing today regarding how obesity contributes to her symptoms.  Discussed orthotics and shoes might reduce  leg fatigue some  Also discussed surgical treatment   Interpreted radiographs.  Surgical treatment would likely include arthrodesis of the first MPJ.  We discussed potential risks and complications and postoperative cares regarding this.  After discussion of this she wishes to continue to pursue compression modifications in shoe gear arch supports and if this remains problematic for her she will return to the clinic for reevaluation and likely surgical planning.  All questions were answered to her satisfaction.  She agrees with this treatment plan.    Discussed that her bunion hallux valgus deformity may be contributing to some fatigue throughout her legs.  Edema and varicosities likely contribute to this significantly as does obesity and overall conditioning.    Jordan Jang DPM            Again, thank you for allowing me to participate in the care of your patient.        Sincerely,        Jordan Jang DPM

## 2019-08-13 ENCOUNTER — HOSPITAL ENCOUNTER (OUTPATIENT)
Dept: ULTRASOUND IMAGING | Facility: CLINIC | Age: 64
Discharge: HOME | End: 2019-08-13
Attending: NURSE PRACTITIONER | Admitting: NURSE PRACTITIONER
Payer: COMMERCIAL

## 2019-08-13 ENCOUNTER — HOSPITAL ENCOUNTER (OUTPATIENT)
Dept: MAMMOGRAPHY | Facility: CLINIC | Age: 64
End: 2019-08-13
Attending: NURSE PRACTITIONER
Payer: COMMERCIAL

## 2019-08-13 DIAGNOSIS — Z12.31 VISIT FOR SCREENING MAMMOGRAM: ICD-10-CM

## 2019-08-13 DIAGNOSIS — I83.813 PAIN DUE TO VARICOSE VEINS OF BOTH LOWER EXTREMITIES: ICD-10-CM

## 2019-08-13 PROCEDURE — 93970 EXTREMITY STUDY: CPT

## 2019-08-13 PROCEDURE — 77063 BREAST TOMOSYNTHESIS BI: CPT

## 2019-08-23 RX ORDER — CLOBETASOL PROPIONATE 0.5 MG/G
OINTMENT TOPICAL
Qty: 30 G | Refills: 0 | Status: SHIPPED | OUTPATIENT
Start: 2019-08-23 | End: 2021-04-07 | Stop reason: SDUPTHER

## 2019-08-25 RX ORDER — OMEPRAZOLE 40 MG/1
40 CAPSULE, DELAYED RELEASE ORAL DAILY
Qty: 90 CAPSULE | Refills: 1 | Status: SHIPPED | OUTPATIENT
Start: 2019-08-25 | End: 2020-02-24 | Stop reason: SDUPTHER

## 2019-08-26 ENCOUNTER — OFFICE VISIT (OUTPATIENT)
Dept: SURGERY | Facility: CLINIC | Age: 64
End: 2019-08-26
Payer: COMMERCIAL

## 2019-08-26 VITALS
OXYGEN SATURATION: 97 % | SYSTOLIC BLOOD PRESSURE: 130 MMHG | HEART RATE: 101 BPM | BODY MASS INDEX: 40.4 KG/M2 | WEIGHT: 293 LBS | DIASTOLIC BLOOD PRESSURE: 74 MMHG

## 2019-08-26 DIAGNOSIS — I83.93 SPIDER VEINS OF BOTH LOWER EXTREMITIES: ICD-10-CM

## 2019-08-26 DIAGNOSIS — I83.813 VARICOSE VEINS OF BILATERAL LOWER EXTREMITIES WITH PAIN: ICD-10-CM

## 2019-08-26 DIAGNOSIS — I83.893 VARICOSE VEINS OF BOTH LEGS WITH EDEMA: Primary | ICD-10-CM

## 2019-08-26 PROCEDURE — 99204 OFFICE O/P NEW MOD 45 MIN: CPT | Performed by: SPECIALIST

## 2019-08-26 NOTE — LETTER
8/26/2019         RE: Ashli Rogers  [address]  [facility] [address]        Dear Colleague,    Thank you for referring your patient, Ashli Rogers, to the [facility]. Please see a copy of my visit note below.    Consult requested by Lakisha Wynne    Reason for consultation - varicose veins    HPI:   Patient is a 64-year-old white female presenting with a many year history of bilateral varicose veins.  For the past few years she is reporting pain tiredness achiness and swelling at the end of the day with the left leg being worse than the right.  She does have a history of phlebitis during her pregnancy many years ago but no history of DVT.  She has not worn compression socks for at least 5 years.  She is also going onto Medicare at the end of this month.  She now presents for evaluation of her varicose veins.    Past Medical History:   Diagnosis Date     Closed fracture of medial malleolus     Distal evulsion fracture of the medial malleoli     Diverticulitis of colon 2/3/2017     DJD (degenerative joint disease) 5/24/2011     Erythema nodosum      Generalized osteoarthrosis, unspecified site     Hips     S/P total hip arthroplasty 4/4/2003     Past Surgical History:   Procedure Laterality Date     C TOTAL HIP ARTHROPLASTY  4/26/04    Hip Replacement, Total     COLONOSCOPY N/A 5/11/2016    Procedure: COLONOSCOPY;  Surgeon: Ervin Johnston MD;  Location:  GI     ESOPHAGOSCOPY, GASTROSCOPY, DUODENOSCOPY (EGD), COMBINED N/A 5/11/2016    Procedure: COMBINED ESOPHAGOSCOPY, GASTROSCOPY, DUODENOSCOPY (EGD), BIOPSY SINGLE OR MULTIPLE;  Surgeon: Ervin Johnston MD;  Location:  GI     HC COLONOSCOPY W/WO BRUSH/WASH  1/11/2006     Current Outpatient Medications   Medication     CALCIUM-MAGNESIUM-ZINC PO     Cholecalciferol (VITAMIN D3 PO)     IBUPROFEN PO     lisdexamfetamine (VYVANSE) 30 MG capsule     omeprazole (PRILOSEC OTC) 20 MG EC tablet     ZYRTEC 10 MG OR TABS     No current  facility-administered medications for this visit.         Allergies   Allergen Reactions     Cephalexin Monohydrate      keflex     Gluten Meal GI Disturbance     Warfarin Sodium      coumadin     Social History     Socioeconomic History     Marital status:      Spouse name: Osbaldo     Number of children: 2     Years of education: Not on file     Highest education level: Not on file   Occupational History     Not on file   Social Needs     Financial resource strain: Not on file     Food insecurity:     Worry: Not on file     Inability: Not on file     Transportation needs:     Medical: Not on file     Non-medical: Not on file   Tobacco Use     Smoking status: Never Smoker     Smokeless tobacco: Never Used   Substance and Sexual Activity     Alcohol use: Yes     Comment: 2 drinks per month     Drug use: No     Sexual activity: Yes     Partners: Male   Lifestyle     Physical activity:     Days per week: Not on file     Minutes per session: Not on file     Stress: Not on file   Relationships     Social connections:     Talks on phone: Not on file     Gets together: Not on file     Attends Caodaism service: Not on file     Active member of club or organization: Not on file     Attends meetings of clubs or organizations: Not on file     Relationship status: Not on file     Intimate partner violence:     Fear of current or ex partner: Not on file     Emotionally abused: Not on file     Physically abused: Not on file     Forced sexual activity: Not on file   Other Topics Concern      Service Not Asked     Blood Transfusions Not Asked     Caffeine Concern Not Asked     Occupational Exposure Not Asked     Hobby Hazards Not Asked     Sleep Concern Not Asked     Stress Concern Not Asked     Weight Concern Yes     Comment: going to lose 100 pounds     Special Diet Not Asked     Back Care Not Asked     Exercise Not Asked     Bike Helmet Not Asked     Seat Belt Not Asked     Self-Exams Not Asked     Parent/sibling  w/ CABG, MI or angioplasty before 65F 55M? Not Asked   Social History Narrative     Not on file     Family History   Problem Relation Age of Onset     Cancer Father          of lung cancer     Heart Disease Daughter         2 holes in her heart      ROS: 10 point ROS neg other than the symptoms noted above in the HPI.    PE:  B/P: 130/74, T: Data Unavailable, P: 101, R: Data Unavailable  General: well developed, well nourished WF who appears her stated age  HEENT: NC/AT, EOMI, (-)icterus, (-)injection  Neck: Supple, No JVD  Chest: CTA  Heart: S1, S2, (-)m/r/g  Abd: Soft, non tender, non distended, non tender, no masses  Ext; Warm, +1 edema b/l.  B/L Varicose and spider veins L>>R  Psych: AAOx3  Neuro: No focal deficits      US -   VENOUS ULTRASOUND BILATERAL LEG(S)  2019 10:40 AM      HISTORY: Pain due to varicose veins of both lower extremities.     COMPARISON: None.     FINDINGS: Examination of the deep veins with graded compression and  color flow Doppler with spectral wave form analysis was performed.  Images show no evidence of thrombus in the bilateral common femoral  vein, femoral vein, popliteal vein or calf veins. Multiple varicose  veins are noted bilaterally.     Right: No deep venous insufficiency.     The right great saphenous and anterior accessory saphenous vein are  incompetent with a reflux time of up to 3 seconds. The right small  saphenous vein is competent.     Left: Deep venous insufficiency is noted at the popliteal vein.     The left great saphenous vein is incompetent with a reflux time of 2.9  seconds. The left small saphenous vein is competent without evidence  of reflux.                                                                      IMPRESSION:  1. No deep vein thrombosis in either lower extremity.  2. No right deep venous insufficiency.  3. Left deep venous insufficiency in the popliteal vein.  4. Right superficial venous insufficiency in the anterior accessory  great  saphenous vein and great saphenous vein.  5. Left superficial venous insufficiency in the great saphenous vein.  6. Bilateral small saphenous veins are competent without evidence of  reflux.  7. Varicosities are noted bilaterally.     CAROL CUMMINS DO    Impression/plan:  This is a 64-year-old lady with bilateral symptomatic varicose veins with the left being worse than the right.  She is a CEAP 3 bilaterally.  Her ultrasound revealed right ASV and GSV reflux and only GSV reflux on the left.  I discussed the etiology of varicose veins with her and she expressed understanding.  The plan at this time is to restart her and her compression socks and have her follow-up in 6 weeks.  Based on her ultrasound she is a candidate for a left GS V radiofrequency ablation with medically necessary stab phlebectomy and a right ASV, GSV radiofrequency ablation with medically necessary stab phlebectomies.  I briefly discussed the procedure with the patient and she expressed understanding.  She will follow-up with me in 6 weeks.      German Olvera MD, FACS    Again, thank you for allowing me to participate in the care of your patient.        Sincerely,        German Olvera MD

## 2019-08-26 NOTE — PROGRESS NOTES
Consult requested by Lakisha Wynne    Reason for consultation - varicose veins    HPI:   Patient is a 64-year-old white female presenting with a many year history of bilateral varicose veins.  For the past few years she is reporting pain tiredness achiness and swelling at the end of the day with the left leg being worse than the right.  She does have a history of phlebitis during her pregnancy many years ago but no history of DVT.  She has not worn compression socks for at least 5 years.  She is also going onto Medicare at the end of this month.  She now presents for evaluation of her varicose veins.    Past Medical History:   Diagnosis Date     Closed fracture of medial malleolus     Distal evulsion fracture of the medial malleoli     Diverticulitis of colon 2/3/2017     DJD (degenerative joint disease) 5/24/2011     Erythema nodosum      Generalized osteoarthrosis, unspecified site     Hips     S/P total hip arthroplasty 4/4/2003     Past Surgical History:   Procedure Laterality Date     C TOTAL HIP ARTHROPLASTY  4/26/04    Hip Replacement, Total     COLONOSCOPY N/A 5/11/2016    Procedure: COLONOSCOPY;  Surgeon: Ervin Johnston MD;  Location:  GI     ESOPHAGOSCOPY, GASTROSCOPY, DUODENOSCOPY (EGD), COMBINED N/A 5/11/2016    Procedure: COMBINED ESOPHAGOSCOPY, GASTROSCOPY, DUODENOSCOPY (EGD), BIOPSY SINGLE OR MULTIPLE;  Surgeon: Ervin Johnston MD;  Location:  GI     HC COLONOSCOPY W/WO BRUSH/WASH  1/11/2006     Current Outpatient Medications   Medication     CALCIUM-MAGNESIUM-ZINC PO     Cholecalciferol (VITAMIN D3 PO)     IBUPROFEN PO     lisdexamfetamine (VYVANSE) 30 MG capsule     omeprazole (PRILOSEC OTC) 20 MG EC tablet     ZYRTEC 10 MG OR TABS     No current facility-administered medications for this visit.         Allergies   Allergen Reactions     Cephalexin Monohydrate      keflex     Gluten Meal GI Disturbance     Warfarin Sodium      coumadin     Social History     Socioeconomic History      Marital status:      Spouse name: Osbaldo     Number of children: 2     Years of education: Not on file     Highest education level: Not on file   Occupational History     Not on file   Social Needs     Financial resource strain: Not on file     Food insecurity:     Worry: Not on file     Inability: Not on file     Transportation needs:     Medical: Not on file     Non-medical: Not on file   Tobacco Use     Smoking status: Never Smoker     Smokeless tobacco: Never Used   Substance and Sexual Activity     Alcohol use: Yes     Comment: 2 drinks per month     Drug use: No     Sexual activity: Yes     Partners: Male   Lifestyle     Physical activity:     Days per week: Not on file     Minutes per session: Not on file     Stress: Not on file   Relationships     Social connections:     Talks on phone: Not on file     Gets together: Not on file     Attends Confucianist service: Not on file     Active member of club or organization: Not on file     Attends meetings of clubs or organizations: Not on file     Relationship status: Not on file     Intimate partner violence:     Fear of current or ex partner: Not on file     Emotionally abused: Not on file     Physically abused: Not on file     Forced sexual activity: Not on file   Other Topics Concern      Service Not Asked     Blood Transfusions Not Asked     Caffeine Concern Not Asked     Occupational Exposure Not Asked     Hobby Hazards Not Asked     Sleep Concern Not Asked     Stress Concern Not Asked     Weight Concern Yes     Comment: going to lose 100 pounds     Special Diet Not Asked     Back Care Not Asked     Exercise Not Asked     Bike Helmet Not Asked     Seat Belt Not Asked     Self-Exams Not Asked     Parent/sibling w/ CABG, MI or angioplasty before 65F 55M? Not Asked   Social History Narrative     Not on file     Family History   Problem Relation Age of Onset     Cancer Father          of lung cancer     Heart Disease Daughter         2 holes  in her heart      ROS: 10 point ROS neg other than the symptoms noted above in the HPI.    PE:  B/P: 130/74, T: Data Unavailable, P: 101, R: Data Unavailable  General: well developed, well nourished WF who appears her stated age  HEENT: NC/AT, EOMI, (-)icterus, (-)injection  Neck: Supple, No JVD  Chest: CTA  Heart: S1, S2, (-)m/r/g  Abd: Soft, non tender, non distended, non tender, no masses  Ext; Warm, +1 edema b/l.  B/L Varicose and spider veins L>>R  Psych: AAOx3  Neuro: No focal deficits      US -   VENOUS ULTRASOUND BILATERAL LEG(S)  8/13/2019 10:40 AM      HISTORY: Pain due to varicose veins of both lower extremities.     COMPARISON: None.     FINDINGS: Examination of the deep veins with graded compression and  color flow Doppler with spectral wave form analysis was performed.  Images show no evidence of thrombus in the bilateral common femoral  vein, femoral vein, popliteal vein or calf veins. Multiple varicose  veins are noted bilaterally.     Right: No deep venous insufficiency.     The right great saphenous and anterior accessory saphenous vein are  incompetent with a reflux time of up to 3 seconds. The right small  saphenous vein is competent.     Left: Deep venous insufficiency is noted at the popliteal vein.     The left great saphenous vein is incompetent with a reflux time of 2.9  seconds. The left small saphenous vein is competent without evidence  of reflux.                                                                      IMPRESSION:  1. No deep vein thrombosis in either lower extremity.  2. No right deep venous insufficiency.  3. Left deep venous insufficiency in the popliteal vein.  4. Right superficial venous insufficiency in the anterior accessory  great saphenous vein and great saphenous vein.  5. Left superficial venous insufficiency in the great saphenous vein.  6. Bilateral small saphenous veins are competent without evidence of  reflux.  7. Varicosities are noted  bilaterally.     CAROL CUMMINS,     Impression/plan:  This is a 64-year-old lady with bilateral symptomatic varicose veins with the left being worse than the right.  She is a CEAP 3 bilaterally.  Her ultrasound revealed right ASV and GSV reflux and only GSV reflux on the left.  I discussed the etiology of varicose veins with her and she expressed understanding.  The plan at this time is to restart her and her compression socks and have her follow-up in 6 weeks.  Based on her ultrasound she is a candidate for a left GS V radiofrequency ablation with medically necessary stab phlebectomy and a right ASV, GSV radiofrequency ablation with medically necessary stab phlebectomies.  I briefly discussed the procedure with the patient and she expressed understanding.  She will follow-up with me in 6 weeks.      German Olvera MD, FACS

## 2019-08-26 NOTE — NURSING NOTE
[facility]    Ashli Rogers has been given the following teaching information:  [employer] phamplet with new patient information packet.  Also given [email or web address] website, as patient wanted to check out other support socks..................

## 2019-08-27 RX ORDER — CLOBETASOL PROPIONATE 0.5 MG/G
OINTMENT TOPICAL
Qty: 15 G | Refills: 0 | OUTPATIENT
Start: 2019-08-27

## 2019-09-03 RX ORDER — VERAPAMIL HYDROCHLORIDE 240 MG/1
TABLET, FILM COATED, EXTENDED RELEASE ORAL
Qty: 90 TABLET | Refills: 0 | Status: SHIPPED | OUTPATIENT
Start: 2019-09-03 | End: 2019-12-07 | Stop reason: SDUPTHER

## 2019-09-06 ENCOUNTER — E-ADVICE (OUTPATIENT)
Dept: FAMILY MEDICINE | Age: 64
End: 2019-09-06

## 2019-09-09 RX ORDER — FLUTICASONE PROPIONATE 50 MCG
SPRAY, SUSPENSION (ML) NASAL
Qty: 16 G | Refills: 9 | Status: SHIPPED | OUTPATIENT
Start: 2019-09-09 | End: 2019-10-19 | Stop reason: SDUPTHER

## 2019-10-10 RX ORDER — LOSARTAN POTASSIUM AND HYDROCHLOROTHIAZIDE 25; 100 MG/1; MG/1
1 TABLET ORAL DAILY
Qty: 90 TABLET | Refills: 0 | Status: SHIPPED | OUTPATIENT
Start: 2019-10-10 | End: 2020-01-06 | Stop reason: SDUPTHER

## 2019-10-19 ENCOUNTER — E-ADVICE (OUTPATIENT)
Dept: FAMILY MEDICINE | Age: 64
End: 2019-10-19

## 2019-10-21 RX ORDER — FLUTICASONE PROPIONATE 50 MCG
SPRAY, SUSPENSION (ML) NASAL
Qty: 3 BOTTLE | Refills: 2 | Status: SHIPPED | OUTPATIENT
Start: 2019-10-21 | End: 2020-10-06

## 2019-10-24 ENCOUNTER — TELEPHONE (OUTPATIENT)
Dept: FAMILY MEDICINE | Age: 64
End: 2019-10-24

## 2019-10-24 ENCOUNTER — OFFICE VISIT (OUTPATIENT)
Dept: ALLERGY | Age: 64
End: 2019-10-24

## 2019-10-24 VITALS
SYSTOLIC BLOOD PRESSURE: 156 MMHG | WEIGHT: 230 LBS | HEART RATE: 80 BPM | HEIGHT: 67 IN | BODY MASS INDEX: 36.1 KG/M2 | TEMPERATURE: 98.2 F | DIASTOLIC BLOOD PRESSURE: 98 MMHG

## 2019-10-24 DIAGNOSIS — J30.1 ALLERGIC RHINITIS DUE TO POLLEN, UNSPECIFIED SEASONALITY: ICD-10-CM

## 2019-10-24 DIAGNOSIS — J45.30 MILD PERSISTENT ASTHMA WITHOUT COMPLICATION: Primary | ICD-10-CM

## 2019-10-24 PROCEDURE — 94010 BREATHING CAPACITY TEST: CPT | Performed by: ALLERGY & IMMUNOLOGY

## 2019-10-24 PROCEDURE — 99214 OFFICE O/P EST MOD 30 MIN: CPT | Performed by: ALLERGY & IMMUNOLOGY

## 2019-10-25 ENCOUNTER — NURSE ONLY (OUTPATIENT)
Dept: FAMILY MEDICINE | Age: 64
End: 2019-10-25

## 2019-10-25 VITALS — HEART RATE: 77 BPM | OXYGEN SATURATION: 97 % | DIASTOLIC BLOOD PRESSURE: 94 MMHG | SYSTOLIC BLOOD PRESSURE: 162 MMHG

## 2019-10-25 DIAGNOSIS — I10 ESSENTIAL HYPERTENSION: Primary | ICD-10-CM

## 2019-10-25 PROCEDURE — 99211 OFF/OP EST MAY X REQ PHY/QHP: CPT

## 2019-10-25 RX ORDER — HYDRALAZINE HYDROCHLORIDE 10 MG/1
10 TABLET, FILM COATED ORAL 2 TIMES DAILY
Qty: 60 TABLET | Refills: 0 | Status: SHIPPED | OUTPATIENT
Start: 2019-10-25 | End: 2019-11-18 | Stop reason: SDUPTHER

## 2019-10-31 ENCOUNTER — TELEPHONE (OUTPATIENT)
Dept: FAMILY MEDICINE | Age: 64
End: 2019-10-31

## 2019-11-01 ENCOUNTER — LAB SERVICES (OUTPATIENT)
Dept: LAB | Age: 64
End: 2019-11-01

## 2019-11-01 ENCOUNTER — OFFICE VISIT (OUTPATIENT)
Dept: OPTOMETRY | Age: 64
End: 2019-11-01

## 2019-11-01 DIAGNOSIS — H52.4 PRESBYOPIA: ICD-10-CM

## 2019-11-01 DIAGNOSIS — E03.9 HYPOTHYROIDISM, UNSPECIFIED TYPE: ICD-10-CM

## 2019-11-01 DIAGNOSIS — H52.13 MYOPIA OF BOTH EYES: ICD-10-CM

## 2019-11-01 DIAGNOSIS — H52.223 REGULAR ASTIGMATISM OF BOTH EYES: ICD-10-CM

## 2019-11-01 DIAGNOSIS — H25.13 NUCLEAR SCLEROTIC CATARACT OF BOTH EYES: ICD-10-CM

## 2019-11-01 DIAGNOSIS — H40.053 BORDERLINE GLAUCOMA OF BOTH EYES WITH OCULAR HYPERTENSION: Primary | ICD-10-CM

## 2019-11-01 LAB — TSH SERPL DL<=0.05 MIU/L-ACNC: 2.06 M[IU]/L (ref 0.3–4.82)

## 2019-11-01 PROCEDURE — 92014 COMPRE OPH EXAM EST PT 1/>: CPT | Performed by: OPTOMETRIST

## 2019-11-01 PROCEDURE — 36415 COLL VENOUS BLD VENIPUNCTURE: CPT | Performed by: FAMILY MEDICINE

## 2019-11-01 PROCEDURE — 84443 ASSAY THYROID STIM HORMONE: CPT | Performed by: FAMILY MEDICINE

## 2019-11-01 PROCEDURE — 92015 DETERMINE REFRACTIVE STATE: CPT | Performed by: OPTOMETRIST

## 2019-11-01 ASSESSMENT — REFRACTION_MANIFEST
OS_CYLINDER: +1.75
OD_CYLINDER: +1.00
OD_AXIS: 120
OD_SPHERE: -2.75
OS_ADD: +2.25
OD_ADD: +2.25
OS_SPHERE: -3.75
OS_AXIS: 065

## 2019-11-01 ASSESSMENT — CUP TO DISC RATIO
OS_RATIO: 0.3
OD_RATIO: 0.3

## 2019-11-01 ASSESSMENT — REFRACTION_WEARINGRX
OD_ADD: +2.25
OD_SPHERE: -4.50
OD_CYLINDER: +2.00
OS_CYLINDER: +1.75
OS_AXIS: 065
OS_ADD: +2.25
OD_AXIS: 120
OS_SPHERE: -4.25

## 2019-11-01 ASSESSMENT — VISUAL ACUITY
OD_CC: J1+@16
OD_CC+: -1
METHOD: SNELLEN - LINEAR
OS_CC: 20/20
CORRECTION_TYPE: GLASSES
OD_CC: 20/25
OS_CC: J1+@16

## 2019-11-01 ASSESSMENT — KERATOMETRY
OD_K2POWER_DIOPTERS: 42.87
OD_AXISANGLE_DEGREES: 090
OS_AXISANGLE_DEGREES: 090
OS_AXISANGLE2_DEGREES: 180
OD_AXISANGLE2_DEGREES: 180
OS_K1POWER_DIOPTERS: 42.12
OS_K2POWER_DIOPTERS: 43.13
OD_K1POWER_DIOPTERS: 42.12

## 2019-11-01 ASSESSMENT — SLIT LAMP EXAM - LIDS
COMMENTS: NORMAL
COMMENTS: NORMAL

## 2019-11-01 ASSESSMENT — TONOMETRY
OD_IOP_MMHG: 18
OS_IOP_MMHG: 18
IOP_METHOD: APPLANATION

## 2019-11-01 ASSESSMENT — CONF VISUAL FIELD
OS_NORMAL: 1
OD_NORMAL: 1

## 2019-11-01 ASSESSMENT — EXTERNAL EXAM - RIGHT EYE: OD_EXAM: NORMAL

## 2019-11-01 ASSESSMENT — EXTERNAL EXAM - LEFT EYE: OS_EXAM: NORMAL

## 2019-11-08 ENCOUNTER — TELEPHONE (OUTPATIENT)
Dept: FAMILY MEDICINE | Age: 64
End: 2019-11-08

## 2019-11-18 ENCOUNTER — OFFICE VISIT (OUTPATIENT)
Dept: SURGERY | Facility: CLINIC | Age: 64
End: 2019-11-18
Payer: COMMERCIAL

## 2019-11-18 ENCOUNTER — TELEPHONE (OUTPATIENT)
Dept: SURGERY | Facility: CLINIC | Age: 64
End: 2019-11-18

## 2019-11-18 VITALS
DIASTOLIC BLOOD PRESSURE: 72 MMHG | WEIGHT: 293 LBS | HEART RATE: 95 BPM | SYSTOLIC BLOOD PRESSURE: 118 MMHG | BODY MASS INDEX: 41.58 KG/M2

## 2019-11-18 DIAGNOSIS — I83.893 VARICOSE VEINS OF BOTH LEGS WITH EDEMA: Primary | ICD-10-CM

## 2019-11-18 DIAGNOSIS — I83.93 SPIDER VEINS OF BOTH LOWER EXTREMITIES: ICD-10-CM

## 2019-11-18 DIAGNOSIS — I83.813 VARICOSE VEINS OF BILATERAL LOWER EXTREMITIES WITH PAIN: ICD-10-CM

## 2019-11-18 DIAGNOSIS — I10 ESSENTIAL HYPERTENSION: ICD-10-CM

## 2019-11-18 PROCEDURE — 99213 OFFICE O/P EST LOW 20 MIN: CPT | Performed by: SPECIALIST

## 2019-11-18 NOTE — LETTER
11/18/2019         RE: Ashli Rogers  [address]  [place] [address]        Dear Colleague,    Thank you for referring your patient, Ashli Rogers, to the [facility]. Please see a copy of my visit note below.    F/U for varicose veins    Subjective:     Patient is a 64-year-old white female presenting with a many year history of bilateral varicose veins.  For the past few years she is reporting pain tiredness achiness and swelling at the end of the day with the left leg being worse than the right.  She does have a history of phlebitis during her pregnancy many years ago but no history of DVT.        She has been wearing compression socks since August without relief of her symptoms.  The left leg continues to be worse than the right.      Objective:  B/P: 118/72, T: Data Unavailable, P: 95, R: Data Unavailable  Ext; Warm, +1 edema b/l.  B/L Varicose and spider veins L>>R      US -   VENOUS ULTRASOUND BILATERAL LEG(S)  8/13/2019 10:40 AM      HISTORY: Pain due to varicose veins of both lower extremities.     COMPARISON: None.     FINDINGS: Examination of the deep veins with graded compression and  color flow Doppler with spectral wave form analysis was performed.  Images show no evidence of thrombus in the bilateral common femoral  vein, femoral vein, popliteal vein or calf veins. Multiple varicose  veins are noted bilaterally.     Right: No deep venous insufficiency.     The right great saphenous and anterior accessory saphenous vein are  incompetent with a reflux time of up to 3 seconds. The right small  saphenous vein is competent.     Left: Deep venous insufficiency is noted at the popliteal vein.     The left great saphenous vein is incompetent with a reflux time of 2.9  seconds. The left small saphenous vein is competent without evidence  of reflux.                                                                      IMPRESSION:  1. No deep vein thrombosis in either lower extremity.  2.  No right deep venous insufficiency.  3. Left deep venous insufficiency in the popliteal vein.  4. Right superficial venous insufficiency in the anterior accessory  great saphenous vein and great saphenous vein.  5. Left superficial venous insufficiency in the great saphenous vein.  6. Bilateral small saphenous veins are competent without evidence of  reflux.  7. Varicosities are noted bilaterally.     CAROL CUMMINS,     Impression/plan:  This is a 64-year-old lady with bilateral symptomatic varicose veins with the left being worse than the right.  She is a CEAP 3 bilaterally.  Her ultrasound revealed right ASV and GSV reflux and only GSV reflux on the left.  I discussed the etiology of varicose veins with her and she expressed understanding.  She continues to be symptomatic despite compression therapy.      Based on her ultrasound she is a candidate for a left GS V radiofrequency ablation with medically necessary stab phlebectomy and a right ASV, GSV radiofrequency ablation with medically necessary stab phlebectomies.   The plan is to the left leg first followed by the right.  The procedures, risks, benefits alternatives were discussed and she agrees to proceed.  She will be scheduled once insurance approval is obtained.    German Olvera MD, FACS    Again, thank you for allowing me to participate in the care of your patient.        Sincerely,        German Olvera MD

## 2019-11-18 NOTE — TELEPHONE ENCOUNTER
VM left requesting patient to stop at [facility]  to sign consents forms per Dr. Olvera,.........      Consents forms at [facility] upper , when completed place in Dr. Olvera's [place] mailbox.......Mindy Wall CMA  ([facility])

## 2019-11-18 NOTE — PROGRESS NOTES
F/U for varicose veins    Subjective:     Patient is a 64-year-old white female presenting with a many year history of bilateral varicose veins.  For the past few years she is reporting pain tiredness achiness and swelling at the end of the day with the left leg being worse than the right.  She does have a history of phlebitis during her pregnancy many years ago but no history of DVT.        She has been wearing compression socks since August without relief of her symptoms.  The left leg continues to be worse than the right.      Objective:  B/P: 118/72, T: Data Unavailable, P: 95, R: Data Unavailable  Ext; Warm, +1 edema b/l.  B/L Varicose and spider veins L>>R      US -   VENOUS ULTRASOUND BILATERAL LEG(S)  8/13/2019 10:40 AM      HISTORY: Pain due to varicose veins of both lower extremities.     COMPARISON: None.     FINDINGS: Examination of the deep veins with graded compression and  color flow Doppler with spectral wave form analysis was performed.  Images show no evidence of thrombus in the bilateral common femoral  vein, femoral vein, popliteal vein or calf veins. Multiple varicose  veins are noted bilaterally.     Right: No deep venous insufficiency.     The right great saphenous and anterior accessory saphenous vein are  incompetent with a reflux time of up to 3 seconds. The right small  saphenous vein is competent.     Left: Deep venous insufficiency is noted at the popliteal vein.     The left great saphenous vein is incompetent with a reflux time of 2.9  seconds. The left small saphenous vein is competent without evidence  of reflux.                                                                      IMPRESSION:  1. No deep vein thrombosis in either lower extremity.  2. No right deep venous insufficiency.  3. Left deep venous insufficiency in the popliteal vein.  4. Right superficial venous insufficiency in the anterior accessory  great saphenous vein and great saphenous vein.  5. Left superficial  venous insufficiency in the great saphenous vein.  6. Bilateral small saphenous veins are competent without evidence of  reflux.  7. Varicosities are noted bilaterally.     CAROL CUMMINS,     Impression/plan:  This is a 64-year-old lady with bilateral symptomatic varicose veins with the left being worse than the right.  She is a CEAP 3 bilaterally.  Her ultrasound revealed right ASV and GSV reflux and only GSV reflux on the left.  I discussed the etiology of varicose veins with her and she expressed understanding.  She continues to be symptomatic despite compression therapy.      Based on her ultrasound she is a candidate for a left GS V radiofrequency ablation with medically necessary stab phlebectomy and a right ASV, GSV radiofrequency ablation with medically necessary stab phlebectomies.   The plan is to the left leg first followed by the right.  The procedures, risks, benefits alternatives were discussed and she agrees to proceed.  She will be scheduled once insurance approval is obtained.    German Olevra MD, FACS

## 2019-11-18 NOTE — NURSING NOTE
[facility]    Ashli F Rogers has been given the following teaching information:  [facility] phamphlet with new patient packet  Measured for Thigh high faxed to [facility] with request for surgery and patient demographics .  Patient was instructed to call if she does not hear from [facility]..........Mindy Wall CMA  (AAMA)

## 2019-11-20 RX ORDER — HYDRALAZINE HYDROCHLORIDE 10 MG/1
10 TABLET, FILM COATED ORAL 2 TIMES DAILY
Qty: 60 TABLET | Refills: 0 | Status: SHIPPED | OUTPATIENT
Start: 2019-11-20 | End: 2019-12-19 | Stop reason: SDUPTHER

## 2019-11-21 NOTE — TELEPHONE ENCOUNTER
Consents form completed and given to Dr. Olvera to bring to [facility], copy placed in surgery drawer................Mindy Wall CMA  ([facility])

## 2019-12-07 RX ORDER — VERAPAMIL HYDROCHLORIDE 240 MG/1
TABLET, FILM COATED, EXTENDED RELEASE ORAL
Qty: 90 TABLET | Refills: 0 | Status: SHIPPED | OUTPATIENT
Start: 2019-12-07 | End: 2020-01-06 | Stop reason: DRUGHIGH

## 2019-12-13 ENCOUNTER — TELEPHONE (OUTPATIENT)
Dept: SURGERY | Facility: OTHER | Age: 64
End: 2019-12-13

## 2019-12-13 DIAGNOSIS — I83.813 VARICOSE VEINS OF BOTH LOWER EXTREMITIES WITH PAIN: Primary | ICD-10-CM

## 2019-12-13 DIAGNOSIS — I83.891 VARICOSE VEINS OF LEG WITH EDEMA, RIGHT: ICD-10-CM

## 2019-12-13 NOTE — TELEPHONE ENCOUNTER
Talked to patient and obtained pharmacy information. Chart appeared that hose order was faxed today. But during call, patient states she is unsure if her insurance covers. She has Medicare primary and [employer] secondary.     Writer states she will look through her chart and call back.

## 2019-12-14 ENCOUNTER — E-ADVICE (OUTPATIENT)
Dept: FAMILY MEDICINE | Age: 64
End: 2019-12-14

## 2019-12-19 ENCOUNTER — OFFICE VISIT (OUTPATIENT)
Dept: SLEEP MEDICINE | Facility: CLINIC | Age: 64
End: 2019-12-19
Payer: COMMERCIAL

## 2019-12-19 VITALS
BODY MASS INDEX: 39.68 KG/M2 | SYSTOLIC BLOOD PRESSURE: 128 MMHG | HEIGHT: 72 IN | HEART RATE: 80 BPM | WEIGHT: 293 LBS | OXYGEN SATURATION: 97 % | DIASTOLIC BLOOD PRESSURE: 82 MMHG

## 2019-12-19 DIAGNOSIS — I10 ESSENTIAL HYPERTENSION: ICD-10-CM

## 2019-12-19 DIAGNOSIS — G47.19 EXCESSIVE DAYTIME SLEEPINESS: ICD-10-CM

## 2019-12-19 DIAGNOSIS — R06.81 APNEA: ICD-10-CM

## 2019-12-19 DIAGNOSIS — R06.83 SNORING: Primary | ICD-10-CM

## 2019-12-19 PROCEDURE — 99203 OFFICE O/P NEW LOW 30 MIN: CPT | Performed by: OTOLARYNGOLOGY

## 2019-12-19 ASSESSMENT — MIFFLIN-ST. JEOR: SCORE: 2042.73

## 2019-12-19 NOTE — PROGRESS NOTES
Sleep Consultation:    Date on this visit: 12/19/2019    Ashli Rogers is sent by Lakisha Wynne for a sleep consultation regarding soring.    Primary Physician: Lakisha Wynne     Ashli Rogers reports nightly snoring and apneas for years..     Ashli goes to sleep at 11:00 PM during the week. She wakes up at 8:00 AM with an alarm. She falls asleep in 5 minutes.  Ashli denies difficulty falling asleep.  She wakes up 5-8 times a night for 10 minutes before falling back to sleep.  Ashli wakes up to go to the bathroom and uncertain reasons.  On weekends, Ashli goes to sleep at 11:00 PM.  She wakes up at 8:00 AM with an alarm. She falls asleep in 5 minutes.  Patient gets an average of 6 to 9 hours of sleep per night.     Patient does read in bed.     Ashli does not do shift work.  She is now retired.      Ashli does snore every night. Patient does have a regular bed partner. There is report of snoring and gasping.  She does have witnessed apneas. They occasionally sleep separately.  Patient sleeps on her back and side. She denies occasional restless legs. Ashli denies any bruxism, sleep walking, sleep talking, dream enactment, sleep paralysis, cataplexy and hypnogogic/hypnopompic hallucinations.    She denies sleep walking, sleep talking, enuresis and sleep terrors as a child.  Ashli denies difficulty breathing through her nose, claustrophobia,  and depression.  She does get reflux symptoms at night.    Ashli has gained 0-5 pounds in the last year.  Patient describes themself as a morning person.  Patient's [place] Sleepiness score 13/24 consistent with excessive  daytime sleepiness.      Ashli naps 1-2 times per day for 20-30 minutes, feels refreshed after naps. She takes no inadvertant naps.  She denies closing eyes, dozing and falling asleep while driving. Patient was counseled on the importance of driving while alert, to pull over if drowsy, or nap before getting into the vehicle if sleepy.  She uses 2 cups/day of  coffee. Last caffeine intake is usually before noon.    Allergies:    Allergies   Allergen Reactions     Cephalexin Monohydrate      keflex     Gluten Meal GI Disturbance     Warfarin Sodium      coumadin       Medications:    Current Outpatient Medications   Medication Sig Dispense Refill     CALCIUM-MAGNESIUM-ZINC PO Take 1 tablet by mouth daily Reported on 3/28/2017       Cholecalciferol (VITAMIN D3 PO) Take by mouth daily       IBUPROFEN PO        lisdexamfetamine (VYVANSE) 30 MG capsule Take by mouth every morning       omeprazole (PRILOSEC OTC) 20 MG EC tablet Take 1 tablet (20 mg) by mouth daily 90 tablet 1     ZYRTEC 10 MG OR TABS ONE TABLET DAILY 30 8       Problem List:  Patient Active Problem List    Diagnosis Date Noted     Bunion, left 08/01/2019     Priority: Medium     Pain due to varicose veins of both lower extremities 08/01/2019     Priority: Medium     Diverticulitis of colon 02/03/2017     Priority: Medium     Morbid obesity due to excess calories (H) 07/05/2016     Priority: Medium     Hiatal hernia 07/05/2016     Priority: Medium     Gastroesophageal reflux disease, esophagitis presence not specified 03/18/2016     Priority: Medium     ADD (attention deficit disorder) 03/03/2013     Priority: Medium     On vivanse and is being followed by a mental health specialist        Advanced directives, counseling/discussion 10/28/2011     Priority: Medium     Pt is going to take home the packet and is going to look at it.  MP/MA       DJD (degenerative joint disease) of knee 05/24/2011     Priority: Medium     Allergic rhinitis 11/13/2006     Priority: Medium     Problem list name updated by automated process. Provider to review       Varicose veins of lower extremities with complications 09/28/2005     Priority: Medium     Problem list name updated by automated process. Provider to review       S/P total hip arthroplasty 04/04/2003     Priority: Medium        Past Medical/Surgical History:  Past Medical  History:   Diagnosis Date     Closed fracture of medial malleolus     Distal evulsion fracture of the medial malleoli     Diverticulitis of colon 2/3/2017     DJD (degenerative joint disease) 5/24/2011     Erythema nodosum      Generalized osteoarthrosis, unspecified site     Hips     S/P total hip arthroplasty 4/4/2003     Past Surgical History:   Procedure Laterality Date     C TOTAL HIP ARTHROPLASTY  4/26/04    Hip Replacement, Total     COLONOSCOPY N/A 5/11/2016    Procedure: COLONOSCOPY;  Surgeon: Ervin Johnston MD;  Location: [facility]     ESOPHAGOSCOPY, GASTROSCOPY, DUODENOSCOPY (EGD), COMBINED N/A 5/11/2016    Procedure: COMBINED ESOPHAGOSCOPY, GASTROSCOPY, DUODENOSCOPY (EGD), BIOPSY SINGLE OR MULTIPLE;  Surgeon: Ervin Johnston MD;  Location: [facility]     HC COLONOSCOPY W/WO BRUSH/WASH  1/11/2006       Social History:  Social History     Socioeconomic History     Marital status:      Spouse name: Osbaldo     Number of children: 2     Years of education: Not on file     Highest education level: Not on file   Occupational History     Not on file   Social Needs     Financial resource strain: Not on file     Food insecurity:     Worry: Not on file     Inability: Not on file     Transportation needs:     Medical: Not on file     Non-medical: Not on file   Tobacco Use     Smoking status: Never Smoker     Smokeless tobacco: Never Used   Substance and Sexual Activity     Alcohol use: Yes     Comment: 2 drinks per month     Drug use: No     Sexual activity: Yes     Partners: Male   Lifestyle     Physical activity:     Days per week: Not on file     Minutes per session: Not on file     Stress: Not on file   Relationships     Social connections:     Talks on phone: Not on file     Gets together: Not on file     Attends Rastafarian service: Not on file     Active member of club or organization: Not on file     Attends meetings of clubs or organizations: Not on file     Relationship status: Not on file      Intimate partner violence:     Fear of current or ex partner: Not on file     Emotionally abused: Not on file     Physically abused: Not on file     Forced sexual activity: Not on file   Other Topics Concern      Service Not Asked     Blood Transfusions Not Asked     Caffeine Concern Not Asked     Occupational Exposure Not Asked     Hobby Hazards Not Asked     Sleep Concern Not Asked     Stress Concern Not Asked     Weight Concern Yes     Comment: going to lose 100 pounds     Special Diet Not Asked     Back Care Not Asked     Exercise Not Asked     Bike Helmet Not Asked     Seat Belt Not Asked     Self-Exams Not Asked     Parent/sibling w/ CABG, MI or angioplasty before 65F 55M? Not Asked   Social History Narrative     Not on file       Family History:  Family History   Problem Relation Age of Onset     Cancer Father          of lung cancer     Heart Disease Daughter         2 holes in her heart       Review of Systems:  A complete review of systems reviewed by me is negative with the exeption of what has been mentioned in the history of present illness.  CONSTITUTIONAL: NEGATIVE for weight gain/loss, fever, chills, sweats or night sweats, drug allergies.  EYES: NEGATIVE for changes in vision, blind spots, double vision.  ENT: NEGATIVE for ear pain, sore throat, sinus pain, post-nasal drip, runny nose, bloody nose  CARDIAC: NEGATIVE for fast heartbeats or fluttering in chest, chest pain or pressure, breathlessness when lying flat, swollen legs or swollen feet.  NEUROLOGIC: NEGATIVE headaches, weakness or numbness in the arms or legs.  DERMATOLOGIC: NEGATIVE for rashes, new moles or change in mole(s)  PULMONARY: NEGATIVE SOB at rest, SOB with activity, dry cough, productive cough, coughing up blood, wheezing or whistling when breathing.    GASTROINTESTINAL: NEGATIVE for nausea or vomitting, loose or watery stools, fat or grease in stools, constipation, abdominal pain, bowel movements black in color or  blood noted.  GENITOURINARY: NEGATIVE for pain during urination, blood in urine, urinating more frequently than usual, irregular menstrual periods.  MUSCULOSKELETAL: NEGATIVE for muscle pain, bone or joint pain, swollen joints.  ENDOCRINE: NEGATIVE for increased thirst or urination, diabetes.  LYMPHATIC: NEGATIVE for swollen lymph nodes, lumps or bumps in the breasts or nipple discharge.    Physical Examination:  Vitals: LMP 01/19/2005   BMI= There is no height or weight on file to calculate BMI.         No flowsheet data found.         GENERAL APPEARANCE: healthy, alert, no distress, cooperative and over weight  EYES: Eyes grossly normal to inspection, PERRL and conjunctivae and sclerae normal  HENT: ear canals and TM's normal, nose and mouth without ulcers or lesions, oropharynx crowded, uvula elongated, soft palate dependent and tongue base enlarged  NECK: no adenopathy, no asymmetry, masses, or scars and thyroid normal to palpation  RESP: lungs clear to auscultation - no rales, rhonchi or wheezes  NEURO: Normal strength and tone, mentation intact and speech normal  PSYCH: mentation appears normal and affect normal/bright  Mallampati Class: III.  Tonsillar Stage: 1  hidden by pillars.  Class I occlusion  Impression/Plan:    The patienT with snoring, apneas, and EDS. WIll order PSG for possible ZELDA.  Literature provided regarding sleep apnea and sleep hygiene.      She will follow up with me in approximately two weeks after her sleep study has been competed to review the results and discuss plan of care.       Polysomnography reviewed.  Obstructive sleep apnea reviewed.  Complications of untreated sleep apnea were reviewed.    Mike Wilder MD      CC: Lakisha Wynne

## 2019-12-21 RX ORDER — HYDRALAZINE HYDROCHLORIDE 10 MG/1
10 TABLET, FILM COATED ORAL 2 TIMES DAILY
Qty: 60 TABLET | Refills: 0 | Status: SHIPPED | OUTPATIENT
Start: 2019-12-21 | End: 2020-01-06 | Stop reason: SINTOL

## 2020-01-06 ENCOUNTER — E-ADVICE (OUTPATIENT)
Dept: FAMILY MEDICINE | Age: 65
End: 2020-01-06

## 2020-01-06 ENCOUNTER — LAB SERVICES (OUTPATIENT)
Dept: LAB | Age: 65
End: 2020-01-06

## 2020-01-06 ENCOUNTER — OFFICE VISIT (OUTPATIENT)
Dept: FAMILY MEDICINE | Age: 65
End: 2020-01-06

## 2020-01-06 VITALS
TEMPERATURE: 98 F | DIASTOLIC BLOOD PRESSURE: 86 MMHG | RESPIRATION RATE: 18 BRPM | HEIGHT: 67 IN | SYSTOLIC BLOOD PRESSURE: 152 MMHG | HEART RATE: 80 BPM | WEIGHT: 234 LBS | BODY MASS INDEX: 36.73 KG/M2

## 2020-01-06 DIAGNOSIS — M79.10 MYALGIA: ICD-10-CM

## 2020-01-06 DIAGNOSIS — E03.8 OTHER SPECIFIED HYPOTHYROIDISM: ICD-10-CM

## 2020-01-06 DIAGNOSIS — M25.50 ARTHRALGIA, UNSPECIFIED JOINT: ICD-10-CM

## 2020-01-06 DIAGNOSIS — I10 ESSENTIAL HYPERTENSION: Primary | ICD-10-CM

## 2020-01-06 LAB
ALBUMIN SERPL-MCNC: 4.4 G/DL (ref 3.6–5.1)
ALP SERPL-CCNC: 83 U/L (ref 45–130)
ALT SERPL W/O P-5'-P-CCNC: 47 U/L (ref 4–38)
AST SERPL-CCNC: 40 U/L (ref 14–43)
BASOPHIL %: 0.3 % (ref 0–1.2)
BASOPHIL ABSOLUTE #: 0 10*3/UL (ref 0–0.1)
BILIRUB SERPL-MCNC: 0.8 MG/DL (ref 0–1.3)
BUN SERPL-MCNC: 24 MG/DL (ref 7–20)
CALCIUM SERPL-MCNC: 10.5 MG/DL (ref 8.6–10.6)
CHLORIDE SERPL-SCNC: 104 MMOL/L (ref 96–107)
CK SERPL-CCNC: 83 U/L (ref 30–135)
CO2 SERPL-SCNC: 29 MMOL/L (ref 22–32)
CREAT SERPL-MCNC: 0.6 MG/DL (ref 0.5–1.4)
DIFFERENTIAL TYPE: NORMAL
EOSINOPHIL %: 1.9 % (ref 0–10)
EOSINOPHIL ABSOLUTE #: 0.1 10*3/UL (ref 0–0.5)
GFR SERPL CREATININE-BSD FRML MDRD: >60 ML/MIN/{1.73M2}
GFR SERPL CREATININE-BSD FRML MDRD: >60 ML/MIN/{1.73M2}
GLUCOSE SERPL-MCNC: 105 MG/DL (ref 70–200)
HEMATOCRIT: 39.1 % (ref 34–45)
HEMOGLOBIN: 12.8 G/DL (ref 11.2–15.7)
LYMPH PERCENT: 23.9 % (ref 20.5–51.1)
LYMPHOCYTE ABSOLUTE #: 1.7 10*3/UL (ref 1.2–3.4)
MEAN CORPUSCULAR HGB CONCENTRATION: 32.7 % (ref 32–36)
MEAN CORPUSCULAR HGB: 30.8 PG (ref 27–34)
MEAN CORPUSCULAR VOLUME: 94 FL (ref 79–95)
MEAN PLATELET VOLUME: 11.5 FL (ref 8.6–12.4)
MONOCYTE ABSOLUTE #: 0.8 10*3/UL (ref 0.2–0.9)
MONOCYTE PERCENT: 10.8 % (ref 4.3–12.9)
NEUTROPHIL ABSOLUTE #: 4.5 10*3/UL (ref 1.4–6.5)
NEUTROPHIL PERCENT: 63.1 % (ref 34–73.5)
PLATELET COUNT: 274 10*3/UL (ref 150–400)
POTASSIUM SERPL-SCNC: 4.6 MMOL/L (ref 3.5–5.3)
PROT SERPL-MCNC: 7.6 G/DL (ref 6.4–8.5)
RED BLOOD CELL COUNT: 4.16 10*6/UL (ref 3.7–5.2)
RED CELL DISTRIBUTION WIDTH: 14.1 % (ref 11.3–14.8)
SODIUM SERPL-SCNC: 142 MMOL/L (ref 136–146)
TSH SERPL DL<=0.05 MIU/L-ACNC: 2.18 M[IU]/L (ref 0.3–4.82)
WHITE BLOOD CELL COUNT: 7.2 10*3/UL (ref 4–10)

## 2020-01-06 PROCEDURE — 82550 ASSAY OF CK (CPK): CPT | Performed by: FAMILY MEDICINE

## 2020-01-06 PROCEDURE — 84443 ASSAY THYROID STIM HORMONE: CPT | Performed by: FAMILY MEDICINE

## 2020-01-06 PROCEDURE — 36415 COLL VENOUS BLD VENIPUNCTURE: CPT | Performed by: FAMILY MEDICINE

## 2020-01-06 PROCEDURE — 99214 OFFICE O/P EST MOD 30 MIN: CPT | Performed by: FAMILY MEDICINE

## 2020-01-06 PROCEDURE — 80053 COMPREHEN METABOLIC PANEL: CPT | Performed by: FAMILY MEDICINE

## 2020-01-06 PROCEDURE — 85025 COMPLETE CBC W/AUTO DIFF WBC: CPT | Performed by: FAMILY MEDICINE

## 2020-01-06 RX ORDER — VERAPAMIL HYDROCHLORIDE 300 MG/1
300 CAPSULE, EXTENDED RELEASE ORAL NIGHTLY
Qty: 30 CAPSULE | Refills: 0 | Status: SHIPPED | OUTPATIENT
Start: 2020-01-06 | End: 2020-01-31 | Stop reason: SDUPTHER

## 2020-01-06 RX ORDER — LOSARTAN POTASSIUM AND HYDROCHLOROTHIAZIDE 25; 100 MG/1; MG/1
1 TABLET ORAL DAILY
Qty: 90 TABLET | Refills: 0 | Status: SHIPPED | OUTPATIENT
Start: 2020-01-06 | End: 2020-04-14

## 2020-01-06 SDOH — HEALTH STABILITY: MENTAL HEALTH: HOW OFTEN DO YOU HAVE A DRINK CONTAINING ALCOHOL?: NEVER

## 2020-01-07 ENCOUNTER — E-ADVICE (OUTPATIENT)
Dept: FAMILY MEDICINE | Age: 65
End: 2020-01-07

## 2020-01-08 ENCOUNTER — THERAPY VISIT (OUTPATIENT)
Dept: SLEEP MEDICINE | Facility: CLINIC | Age: 65
End: 2020-01-08
Payer: COMMERCIAL

## 2020-01-08 DIAGNOSIS — R06.83 SNORING: ICD-10-CM

## 2020-01-08 DIAGNOSIS — R06.81 APNEA: ICD-10-CM

## 2020-01-08 DIAGNOSIS — G47.19 EXCESSIVE DAYTIME SLEEPINESS: ICD-10-CM

## 2020-01-08 PROCEDURE — 95811 POLYSOM 6/>YRS CPAP 4/> PARM: CPT | Performed by: OTOLARYNGOLOGY

## 2020-01-10 LAB — SLPCOMP: NORMAL

## 2020-01-15 NOTE — TELEPHONE ENCOUNTER
Late documentation: Talked to [facility] back in 12/17/19. States they had spoke to patient and she agreed to pay out of pocket of the hoses from [facility].

## 2020-01-20 ENCOUNTER — E-ADVICE (OUTPATIENT)
Dept: FAMILY MEDICINE | Age: 65
End: 2020-01-20

## 2020-01-20 DIAGNOSIS — E78.5 DYSLIPIDEMIA: ICD-10-CM

## 2020-01-20 RX ORDER — ATORVASTATIN CALCIUM 40 MG/1
40 TABLET, FILM COATED ORAL DAILY
Qty: 90 TABLET | Refills: 1 | Status: SHIPPED | OUTPATIENT
Start: 2020-01-20 | End: 2020-08-07 | Stop reason: SDUPTHER

## 2020-01-20 NOTE — TELEPHONE ENCOUNTER
[facility]    Preoperative Nurse Call    Procedure: LT leg phlebs 10-20 med nec, LT leg GSV VNUS closure  Date: Wednesday 1/22  Time: 1300    Called and had to [facility] for patient. Informed patient: when to take medications (1200 clindamycin, clonidine, and ativan), when to check in (1230), to have a /someone that will be responsible for them the rest of the day, to wear loose-fitting comfortable clothing, and to have their compression hose ready at home. Informed patient, that if possible, they should sit in the backseat to elevate their leg on the ride home.    Told patient to give us a call back if any questions.      China Barnes RN

## 2020-01-21 NOTE — TELEPHONE ENCOUNTER
Called and had to [facility] for pt informing her to check in at 1200 to sign the new consent form and bring her medication with her to take at the clinic AFTER signing her consent.     Told her to call us back if any questions.

## 2020-01-22 ENCOUNTER — OFFICE VISIT (OUTPATIENT)
Dept: VASCULAR SURGERY | Facility: CLINIC | Age: 65
End: 2020-01-22
Payer: COMMERCIAL

## 2020-01-22 VITALS — SYSTOLIC BLOOD PRESSURE: 115 MMHG | DIASTOLIC BLOOD PRESSURE: 75 MMHG | OXYGEN SATURATION: 93 % | HEART RATE: 64 BPM

## 2020-01-22 DIAGNOSIS — I83.891 VARICOSE VEINS OF LEG WITH EDEMA, RIGHT: ICD-10-CM

## 2020-01-22 DIAGNOSIS — I83.812 VARICOSE VEINS OF LEFT LOWER EXTREMITY WITH PAIN: Primary | ICD-10-CM

## 2020-01-22 DIAGNOSIS — I83.893 VARICOSE VEINS OF BOTH LEGS WITH EDEMA: ICD-10-CM

## 2020-01-22 DIAGNOSIS — I83.813 VARICOSE VEINS OF BOTH LOWER EXTREMITIES WITH PAIN: Primary | ICD-10-CM

## 2020-01-22 PROCEDURE — 37766 PHLEB VEINS - EXTREM 20+: CPT | Mod: LT | Performed by: SPECIALIST

## 2020-01-22 PROCEDURE — 36475 ENDOVENOUS RF 1ST VEIN: CPT | Mod: LT | Performed by: SPECIALIST

## 2020-01-22 NOTE — NURSING NOTE
Pre-procedure Nursing Note    Ashli Rogers presents to clinic for Vein Procedure  .   /Person Responsible for Patient: Osbaldo ()  Phone Number: [phone]    Prophylactic Medication:Antibiotics, clindamycin 300mg - 2 capsules,   Time Taken: 1208   Sedation Medication: Ativan, 2mg ,   Time Taken: 1208 and Clonidine, 0.1mg,   Time Taken: 1208  Compression Stockings: Picked up in clinic  The procedure is being performed on LLE.  Patient understanding of procedure matches consent? YES        China Barnes RN

## 2020-01-22 NOTE — LETTER
1/22/2020         RE: Ashli Rogers  [address]  [facility] [address]        Dear Colleague,    Thank you for referring your patient, Ashli Rogers, to the [facility]. Please see a copy of my visit note below.    Pre-procedure Nursing Note    Ashli Rogers presents to clinic for Vein Procedure  .   /Person Responsible for Patient: Osbaldo/  Phone Number: [phone]    Prophylactic Medication:Antibiotics, clindamycin 300mg 2 capsules ,   Time Taken: 12:08   Sedation Medication: Ativan, 1mg 2 tabs ,   Time Taken: 12:08 and Clonidine, 0.1mg 1 tab ,   Time Taken: 12:08  Compression Stockings: Patient brought  The procedure is being performed on LLE.  Patient understanding of procedure matches consent? YES        Cathy Clinton Houselog    Again, thank you for allowing me to participate in the care of your patient.        Sincerely,        German Olvera MD

## 2020-01-22 NOTE — PROGRESS NOTES
Pre-procedure Nursing Note    Ashli Rogers presents to clinic for Vein Procedure  .   /Person Responsible for Patient: Osbaldo/  Phone Number: [phone]    Prophylactic Medication:Antibiotics, clindamycin 300mg 2 capsules ,   Time Taken: 12:08   Sedation Medication: Ativan, 1mg 2 tabs ,   Time Taken: 12:08 and Clonidine, 0.1mg 1 tab ,   Time Taken: 12:08  Compression Stockings: Patient brought  The procedure is being performed on LLE.  Patient understanding of procedure matches consent? YES        Cathy Clinton Houselog

## 2020-01-22 NOTE — PROGRESS NOTES
[facility] Procedure Note    Ashli Rogers  January 22, 2020    Ashli Rogers is a 64 year old year old female. She presents for No chief complaint on file.  .    LMP 01/19/2005     Flowsheet Data 1/22/2020   Procedure Start Time: 79700   Prep: Chloraprep   Side: Left   Tx Length (cm): LEFT DISTAL GSV 13   Junction (cm): LEFT DISTAL GSV 0   RF Cycles: LEFT DISTAL GSV 4   RF TX Time (Minutes): LEFT DISTAL GSV 1:20   How many additional vein treatments are being performed? 1   Tx Length (cm) - 2: LEFT PROXIMAL GSV 22.5   Junction 2 (cm): LEFT PROXIMAL GSV 2.85   RF Cycles 2 : LEFT PROXIMAL GSV 6   RF TX Time (Minutes) 2: LEFT PROXIMAL GSV 2:00   # PHLEB Sites: 23   Sedation taken: Yes   Pre Pt. Physical / Cognitive Limitations: WNL   TOTAL Local anesthesia Injected (ml): 11   Max Volume Local Anesthesia (ml): 11   TOTAL Tumescent Injected volume (ml): 472   Max Volume Tumescent (ml): 572   Post Pt. Physical / Cognitive Limitations: WNL   Procedure End Time: 14565   D/C Instructions given, states readiness to leave and escorted to car: Yes       Venus Closure  Date/Time: 1/22/2020 2:27 PM  Performed by: German Olvera MD  Authorized by: German Olvera MD     1st Assist:  Cathy Beard CST/MIRANDA  Circulator:  China Barnes RN  Procedure:  VNUS  Procedure side:  Left  Vein Treated:  GSV  Phlebectomy  Date/Time: 1/22/2020 2:28 PM  Performed by: German Olvera MD  Authorized by: German Olvera MD     1st Assist:  ROMY Gallardo  Circulator:  China Barnes RN  Procedure:  Phlebectomies  Type:  Medically Necessary  Procedure side:  Left  Stabs:  >20        Details of procedure:  With the cooperation of the patient in the preop holding the left lower extremities marked as well as the areas for phlebectomy.  The patient then taken the procedure in the left leg was prepped and draped sterile fashion a timeout was performed confirm the day of the patient was a procedure performed.  The greater  saphenous vein was mapped over its entire length and found to be occluded between the knee and the mid thigh.  Because of this was elected to treat the vein in 2 segments.  We initially accessed the vein with a micropuncture set and a 7 Croatian sheath in the mid calf.  Then also accessed the vein and the wire in the mid thigh.  Tumescent solution was then placed around the greater saphenous vein in the calf and radiofrequency ablation was carried out in 7 cm segments.  We then turned our attention to the thigh 7 Croatian sheath was inserted the catheter was inserted and positioned 2.85 cm in the saphenofemoral junction.  Tumescent solution was then placed around the vein.  Radiofrequency ablation was then carried out 7 cm segments.  The areas were previously marked atherectomy then infiltrated local static and after adequate analgesia was achieved multiple stabs were made using ophthalmic blade and the veins were removed with combination of kasi hook and mosquito clamps.  This was done for a total 23 stabs.  Sterile dressings were applied and the patient was then taken to the recovery in stable condition to be sent home.  German Olvera MD , FACS

## 2020-01-23 ENCOUNTER — E-ADVICE (OUTPATIENT)
Dept: FAMILY MEDICINE | Age: 65
End: 2020-01-23

## 2020-01-24 ENCOUNTER — OFFICE VISIT (OUTPATIENT)
Dept: VASCULAR SURGERY | Facility: CLINIC | Age: 65
End: 2020-01-24
Attending: SPECIALIST
Payer: COMMERCIAL

## 2020-01-24 ENCOUNTER — OFFICE VISIT (OUTPATIENT)
Dept: VASCULAR SURGERY | Facility: CLINIC | Age: 65
End: 2020-01-24
Payer: COMMERCIAL

## 2020-01-24 ENCOUNTER — TELEPHONE (OUTPATIENT)
Dept: SURGERY | Facility: OTHER | Age: 65
End: 2020-01-24

## 2020-01-24 ENCOUNTER — ANCILLARY PROCEDURE (OUTPATIENT)
Dept: ULTRASOUND IMAGING | Facility: CLINIC | Age: 65
End: 2020-01-24
Attending: SPECIALIST
Payer: COMMERCIAL

## 2020-01-24 DIAGNOSIS — I83.891 VARICOSE VEINS OF LEG WITH EDEMA, RIGHT: ICD-10-CM

## 2020-01-24 DIAGNOSIS — I83.813 VARICOSE VEINS OF BOTH LOWER EXTREMITIES WITH PAIN: Primary | ICD-10-CM

## 2020-01-24 DIAGNOSIS — I83.813 VARICOSE VEINS OF BOTH LOWER EXTREMITIES WITH PAIN: ICD-10-CM

## 2020-01-24 PROCEDURE — 99207 ZZC NO CHARGE NURSE ONLY: CPT

## 2020-01-24 PROCEDURE — 93971 EXTREMITY STUDY: CPT | Mod: LT | Performed by: SPECIALIST

## 2020-01-24 NOTE — LETTER
"    2020         RE: Ashli Rogers  [address]  [facility] [address]        Dear Colleague,    Thank you for referring your patient, Ashli Rogers, to the [facility]. Please see a copy of my visit note below.    [facility]                     Post-Op/Re-Wrap Patient Notes    Date: 2020  Patient: Ashli Rogers  : 1955  MRN: [id number]    History:    2020   Visit Type: Re-Wrap   Surgeon: German Olvera   Surgery Date: 20       VS POST OP HISTORY 2020   History: Patient is ambulatory         On 4-6 week Post-Op Check:   No flowsheet data found.    Physical Examination:  VS POST OP PHYSICAL EXAM 2020   Side Left   Physical Exam: Incisions are approximated without signs of infection;Ecchymosis;Swelling       Comments/Notes: Per ultrasound result, LT GSV is closed 6 mm from the SFJ.     Upon entering the room, writer noticed patient had her thigh high compression hose on. Hose only goes up 2-3 cms above mid thigh. Unable to get socks any high. Patient was measured in [facility] by one of the medical assistants of Dr. Olvera.     Patient had agreed for writer to re-measure patient, and see if clinic have her size in stock. Patient states she is happy to buy from us. Writer measured, and patient indeed has socks from us today. See triage note on patient today 2020 for more information.     During visit, patient also states she felt every incision during her first procedure. She was \"very aware of every needle injection\". She is nervous about her second procedure if this will be the same encounter. According to patient's medication scripts, she could potentially get a stronger dose, but writer informed patient, writer will consult with Dr. Olvera on dose adjusements. Also informed writer, clinic want to make sure she is comfortable for her procedure.     Patient also states she was recently diagnosed with sleep apnea. Writer " informed her, we can give her oxygen during her second procedure as well. Also states the heel of her operated leg was painful due to the bandage feeling so tight. Informed writer, we can make adjustment in the ACE bandage for her next procedure as well.     Patient states she acknowledges understanding. Recently, her aunt has passed away. She will be driving to [place] in [facility], will wear her socks and keep self well hydrated. Right now, she is thinking of rescheduling her 2nd procedure, but has not make that determination yet. She will call clinic and inform if she does.       Farshad Nguyen, AUNG      Again, thank you for allowing me to participate in the care of your patient.        Sincerely,         VEIN NURSE

## 2020-01-24 NOTE — PROGRESS NOTES
"[facility]                     Post-Op/Re-Wrap Patient Notes    Date: 2020  Patient: Ashli Rogers  : 1955  MRN: [id number]    History:    2020   Visit Type: Re-Wrap   Surgeon: German Olvera   Surgery Date: 20       VS POST OP HISTORY 2020   History: Patient is ambulatory         On 4-6 week Post-Op Check:   No flowsheet data found.    Physical Examination:  VS POST OP PHYSICAL EXAM 2020   Side Left   Physical Exam: Incisions are approximated without signs of infection;Ecchymosis;Swelling       Comments/Notes: Per ultrasound result, LT GSV is closed 6 mm from the SFJ.     Upon entering the room, writer noticed patient had her thigh high compression hose on. Hose only goes up 2-3 cms above mid thigh. Unable to get socks any high. Patient was measured in [facility] by one of the medical assistants of Dr. Olvera.     Patient had agreed for writer to re-measure patient, and see if clinic have her size in stock. Patient states she is happy to buy from us. Writer measured, and patient indeed has socks from us today. See triage note on patient today 2020 for more information.     During visit, patient also states she felt every incision during her first procedure. She was \"very aware of every needle injection\". She is nervous about her second procedure if this will be the same encounter. According to patient's medication scripts, she could potentially get a stronger dose, but writer informed patient, writer will consult with Dr. Olvera on dose adjusements. Also informed writer, clinic want to make sure she is comfortable for her procedure.     Patient also states she was recently diagnosed with sleep apnea. Writer informed her, we can give her oxygen during her second procedure as well. Also states the heel of her operated leg was painful due to the bandage feeling so tight. Informed writer, we can make adjustment in the ACE bandage for her next procedure as well. "     Patient states she acknowledges understanding. Recently, her aunt has passed away. She will be driving to [place] in [facility], will wear her socks and keep self well hydrated. Right now, she is thinking of rescheduling her 2nd procedure, but has not make that determination yet. She will call clinic and inform if she does.       Farshad Nguyen RN

## 2020-01-24 NOTE — TELEPHONE ENCOUNTER
Called and left a message with patient. Writer spoke to [facility], whom states her hose order cannot be returned. [facility] states there is no way they can determine that size for patient is incorrect, according to measurement sent to them. Also states they have spoke to patient that her measurements at the time only have closed toes. Since her socks were special ordered, they cannot return them. Writer also informed lead RN, and will escalate situation to supervisor. Informed patient of the information above.

## 2020-01-24 NOTE — PROGRESS NOTES
Patient was measured for compression sock in clinic today, and agreed to purchase hose from clinic. Payment completed today.

## 2020-01-27 ENCOUNTER — E-ADVICE (OUTPATIENT)
Dept: FAMILY MEDICINE | Age: 65
End: 2020-01-27

## 2020-01-28 ENCOUNTER — TELEPHONE (OUTPATIENT)
Dept: SURGERY | Facility: OTHER | Age: 65
End: 2020-01-28

## 2020-01-28 NOTE — TELEPHONE ENCOUNTER
Called and informed patient that per Dr. Olvera, okay for patient to get additional 0.5 mg of Ativan. Patient will show up at 1200 for the 2nd procedure, bring all pre-procedural meds to appointment. She will then sign consent then take meds. Patient acknowledges understanding, and would still like pre-op call on the day prior to her 2nd procedure.

## 2020-01-30 ENCOUNTER — OFFICE VISIT (OUTPATIENT)
Dept: SLEEP MEDICINE | Facility: CLINIC | Age: 65
End: 2020-01-30
Payer: COMMERCIAL

## 2020-01-30 VITALS
WEIGHT: 293 LBS | HEIGHT: 72 IN | BODY MASS INDEX: 39.68 KG/M2 | OXYGEN SATURATION: 97 % | HEART RATE: 85 BPM | DIASTOLIC BLOOD PRESSURE: 78 MMHG | SYSTOLIC BLOOD PRESSURE: 118 MMHG

## 2020-01-30 DIAGNOSIS — G47.33 OSA (OBSTRUCTIVE SLEEP APNEA): Primary | ICD-10-CM

## 2020-01-30 PROCEDURE — 99213 OFFICE O/P EST LOW 20 MIN: CPT | Performed by: OTOLARYNGOLOGY

## 2020-01-30 ASSESSMENT — MIFFLIN-ST. JEOR: SCORE: 2040.01

## 2020-01-30 NOTE — NURSING NOTE
Weight management plan: Patient was referred to their PCP to discuss a diet and exercise plan.     Does Ashli have a CPAP/Bipap?  No

## 2020-01-30 NOTE — PROGRESS NOTES
Sleep Study Follow-Up Visit:    Date on this visit: 1/30/2020    Ashli Rogers comes in today for follow-up of her sleep study done on 1/8/2020 at the [facility] for excessive daytime sleepiness and possible sleep apnea.    Sleep latency 3.5 minutes without Ambien.  REM achieved.   REM latency 43 minutes.  Sleep efficiency 83.4 %. Total sleep time 158 minutes.    Sleep architecture:  Stage 1, 15.8 % (5%), stage 2, 28.2 % (45-55%), stage 3, 26.6 % (15-20%), stage REM, 29.4 % (20-25%).  AHI was  47.5, with significant desaturations. RDI 67.2.  REM RDI 63.2, consistent with severe REM ZELDA.  Supine RDI 47.5, consistent with severe SUPINE ZELDA.  Periodic Limb Movement Index 0/hour.       CPAP titration:  Sleep latency 2 minutes.  REM latency 47 minutes.  Sleep efficiency 91.2 %. Total sleep time 244 minutes. Sleep architecture:  Stage 1, 8.8 % (5%), stage 2, 31.8 % (45-55%), stage 3, 14.3 % (15-20%), stage REM, 45.1 % (20-25%).  CPAP was titrated to 7 cm with adequate elimination of apneas, hypopneas and desaturations. Supine REM was noted at this pressure.  Periodic Limb Movement Index 0/hour.       These findings were reviewed with patient.     Past medical/surgical history, family history, social history, medications and allergies were reviewed.      Problem List:  Patient Active Problem List    Diagnosis Date Noted     Mark left 08/01/2019     Priority: Medium     Pain due to varicose veins of both lower extremities 08/01/2019     Priority: Medium     Diverticulitis of colon 02/03/2017     Priority: Medium     Morbid obesity due to excess calories (H) 07/05/2016     Priority: Medium     Hiatal hernia 07/05/2016     Priority: Medium     Gastroesophageal reflux disease, esophagitis presence not specified 03/18/2016     Priority: Medium     ADD (attention deficit disorder) 03/03/2013     Priority: Medium     On vivanse and is being followed by a mental health specialist        Advanced directives,  counseling/discussion 10/28/2011     Priority: Medium     Pt is going to take home the packet and is going to look at it.  MP/MA       DJD (degenerative joint disease) of knee 05/24/2011     Priority: Medium     Allergic rhinitis 11/13/2006     Priority: Medium     Problem list name updated by automated process. Provider to review       Varicose veins of lower extremities with complications 09/28/2005     Priority: Medium     Problem list name updated by automated process. Provider to review       S/P total hip arthroplasty 04/04/2003     Priority: Medium        Impression/Plan:    Patient has a severe obstructive sleep apnea with associated hypoxemia that responds adequately to CPAP therapy.  She was titrated to 7 cm of water pressure with a resulting AHI of 12.  Certainly we believe that AHI can be further decreased to normal level at higher pressures.  We discussed trial of CPAP in auto mode from 5 to 12 cm with heated humidity.  She will follow up with me in about 2 month(s).     Fifteen minutes spent with patient, all of which were spent face-to-face counseling, consulting, coordinating plan of care.      Mike Wilder MD    CC: Lakisha Wynne

## 2020-01-31 RX ORDER — VERAPAMIL HYDROCHLORIDE 300 MG/1
300 CAPSULE, EXTENDED RELEASE ORAL NIGHTLY
Qty: 90 CAPSULE | Refills: 1 | Status: SHIPPED | OUTPATIENT
Start: 2020-01-31 | End: 2020-07-31

## 2020-02-01 DIAGNOSIS — E03.9 HYPOTHYROIDISM, UNSPECIFIED TYPE: ICD-10-CM

## 2020-02-01 RX ORDER — LEVOTHYROXINE SODIUM 137 UG/1
137 TABLET ORAL DAILY
Qty: 90 TABLET | Refills: 3 | Status: SHIPPED | OUTPATIENT
Start: 2020-02-01 | End: 2021-01-26

## 2020-02-04 ENCOUNTER — DOCUMENTATION ONLY (OUTPATIENT)
Dept: SLEEP MEDICINE | Facility: CLINIC | Age: 65
End: 2020-02-04
Payer: COMMERCIAL

## 2020-02-04 NOTE — PROGRESS NOTES
[facility]  Patient Ashli GALAVIZ Rogers was set up at [place] on February 4, 2020. Patient received a Resmed AirSense 10 Auto. Pressures were set at 5-12 cm H2O.   Patient s ramp is 5 cm H2O for Auto and FLEX/EPR is 2.  Patient received a Resmed Mask name: AIRFIT N20  Nasal mask size Large, heated tubing and heated humidifier.  Patient does not need to meet compliance.     Jacquelin Goodson

## 2020-02-04 NOTE — TELEPHONE ENCOUNTER
[facility]    Preoperative Nurse Call      Called and left a detailed message on patient's number. Informed patient: when to check in (1200) to sign consent, to bring their preop medications with them and take them after signing their consent, to have a /someone that will be responsible for them the rest of the day, to wear loose-fitting comfortable clothing, and to bring their compression hose. Informed patient, that if possible, they should sit in the backseat to elevate their leg on the ride home.        Farshad Nguyen RN

## 2020-02-05 ENCOUNTER — APPOINTMENT (OUTPATIENT)
Dept: VASCULAR SURGERY | Facility: CLINIC | Age: 65
End: 2020-02-05
Payer: COMMERCIAL

## 2020-02-05 ENCOUNTER — OFFICE VISIT (OUTPATIENT)
Dept: VASCULAR SURGERY | Facility: CLINIC | Age: 65
End: 2020-02-05
Payer: COMMERCIAL

## 2020-02-05 VITALS — OXYGEN SATURATION: 95 % | SYSTOLIC BLOOD PRESSURE: 109 MMHG | DIASTOLIC BLOOD PRESSURE: 62 MMHG | HEART RATE: 79 BPM

## 2020-02-05 DIAGNOSIS — I83.811 VARICOSE VEINS OF RIGHT LOWER EXTREMITY WITH PAIN: ICD-10-CM

## 2020-02-05 DIAGNOSIS — I83.811 VARICOSE VEINS OF LEG WITH PAIN, RIGHT: Primary | ICD-10-CM

## 2020-02-05 DIAGNOSIS — I83.891 VARICOSE VEINS OF LEG WITH EDEMA, RIGHT: ICD-10-CM

## 2020-02-05 DIAGNOSIS — I83.891 VARICOSE VEINS OF RIGHT LEG WITH EDEMA: ICD-10-CM

## 2020-02-05 PROCEDURE — 37766 PHLEB VEINS - EXTREM 20+: CPT | Mod: 79 | Performed by: SPECIALIST

## 2020-02-05 PROCEDURE — 36476 ENDOVENOUS RF VEIN ADD-ON: CPT | Mod: 79 | Performed by: SPECIALIST

## 2020-02-05 PROCEDURE — 36475 ENDOVENOUS RF 1ST VEIN: CPT | Mod: 79 | Performed by: SPECIALIST

## 2020-02-05 NOTE — NURSING NOTE
Pre-procedure Nursing Note    Ashli Rogers presents to clinic for Vein Procedure  .   /Person Responsible for Patient: Osbaldo (spouse)  Phone Number: [phone]    Prophylactic Medication:Antibiotics, Clindamycin 600mg,   Time Taken: 1200   Sedation Medication: Ativan, 2.5mg ,   Time Taken: 1200 and Clonidine, 0.1mg,   Time Taken: 1200  Compression Stockings: Hose at home  The procedure is being performed on RLE.  Patient understanding of procedure matches consent? YES        China Barnes RN

## 2020-02-05 NOTE — LETTER
2/5/2020         RE: Ashli Rogers  [address]  [facility] [address]        Dear Colleague,    Thank you for referring your patient, Ashli Rogers, to [facility]. Please see a copy of my visit note below.    Pre-procedure Nursing Note    Ashli Rogers presents to clinic for Vein Procedure  .   /Person Responsible for Patient: Osbaldo (spouse)  Phone Number: [phone]    Prophylactic Medication:Antibiotics, Clindamycin 600mg,   Time Taken: 1200   Sedation Medication: Ativan, 2.5mg ,   Time Taken: 1200 and Clonidine, 0.1mg,   Time Taken: 1200  Compression Stockings: Hose at home  The procedure is being performed on RLE.  Patient understanding of procedure matches consent? YES        China Barnes, RN        [facility]    Ashli Rogers  February 5, 2020    Ashli Rogers is a 64 year old year old female. She presents for Vein Procedure  .    /62   Pulse 79   LMP 01/19/2005   SpO2 95%     Flowsheet Data 2/5/2020   Procedure Start Time:  1:37 PM   Prep: Chloraprep   Side: Right   Tx Length (cm): RIGHT GSV 51.5   Junction (cm): RIGHT GSV 2.49   RF Cycles: RIGHT GSV 13   RF TX Time (Minutes): RIGHT GSV 4:20   How many additional vein treatments are being performed? 1   Tx Length (cm) - 2: RIGHT ASV 7   Junction 2 (cm): RIGHT ASV 2.08   RF Cycles 2 : RIGHT ASV 3   RF TX Time (Minutes) 2: RIGHT ASV 0:46   # PHLEB Sites: 47   Sedation taken: Yes   Pre Pt. Physical / Cognitive Limitations: WNL   TOTAL Local anesthesia Injected (ml): 11   Max Volume Local Anesthesia (ml): 11   TOTAL Tumescent Injected volume (ml): 572   Max Volume Tumescent (ml): 858   Post Pt. Physical / Cognitive Limitations: WNL   Procedure End Time:  3:09 PM   D/C Instructions given, states readiness to leave and escorted to car: Yes       Venus Closure  Date/Time: 2/5/2020 3:23 PM  Performed by: German Olvera MD  Authorized by: German Olvera MD     1st Assist:  Cathy  ROMY Beard  Procedure:  VNUS  Procedure side:  Right  Vein Treated:  GSV  Venus Closure  Date/Time: 2/5/2020 3:23 PM  Performed by: German Olvera MD  Authorized by: eGrman Olvera MD     1st Assist:  ROMY Gallardo  Procedure:  VNUS  Procedure side:  Right  Second and Subsequent Vein    Vein Treated:  ASV  Phlebectomy  Date/Time: 2/5/2020 3:23 PM  Performed by: German Olvera MD  Authorized by: German Olvera MD     1st Assist:  ROMY Gallardo  Procedure:  Phlebectomies  Type:  Medically Necessary  Procedure side:  Right  Stabs:  >20      Details of procedure:  Operation the patient in the preop holding with the right leg was marked.  Areas for stab phlebectomy were also marked.  She was then taken the procedure room in the right lower extremity was prepped and draped in sterile fashion.  A timeout was performed confirm the day and the patient also procedure be performed.  The greater saphenous vein was then mapped over its entire length and an access point was chosen in the mid calf.  The vein was accessed with a micropuncture set and a 7 Ethiopian sheath.  Catheter was then passed up and positioned 2.49 cm in the saphenofemoral junction.  We then accessed the anterior accessory saphenous vein and the wire was left in place.  Tumescent solution was placed around the greater saphenous vein and brief radiofrequency ablation was carried out in 7 cm segments.  The catheter and sheath were removed.  The sheath was then inserted into the anterior accessory saphenous vein.  The catheter was then passed up and positioned 2.08 cm from his junction with the greater saphenous vein.  Tumescent solution was placed around the vein.  Radiofrequency ablation was then carried out in 7 cm segments.  The sheath was removed the previously marked areas for staph back with infiltrated local anesthetic.  Multiple stabs were carried out using ophthalmic blade and the veins were removed using a  combination of kasi hooks and mosquito clamps for a total 47 steps.  Sterile dressings were applied and the patient was then taken from the procedure and back to the holding area in stable condition to be sent home.      German Olvera MD, FACS    Again, thank you for allowing me to participate in the care of your patient.        Sincerely,        German Olvera MD

## 2020-02-05 NOTE — PROGRESS NOTES
[facility] Procedure Note    Ashli Rogers  February 5, 2020    Ashli Rogers is a 64 year old year old female. She presents for Vein Procedure  .    /62   Pulse 79   LMP 01/19/2005   SpO2 95%     Flowsheet Data 2/5/2020   Procedure Start Time:  1:37 PM   Prep: Chloraprep   Side: Right   Tx Length (cm): RIGHT GSV 51.5   Junction (cm): RIGHT GSV 2.49   RF Cycles: RIGHT GSV 13   RF TX Time (Minutes): RIGHT GSV 4:20   How many additional vein treatments are being performed? 1   Tx Length (cm) - 2: RIGHT ASV 7   Junction 2 (cm): RIGHT ASV 2.08   RF Cycles 2 : RIGHT ASV 3   RF TX Time (Minutes) 2: RIGHT ASV 0:46   # PHLEB Sites: 47   Sedation taken: Yes   Pre Pt. Physical / Cognitive Limitations: WNL   TOTAL Local anesthesia Injected (ml): 11   Max Volume Local Anesthesia (ml): 11   TOTAL Tumescent Injected volume (ml): 572   Max Volume Tumescent (ml): 858   Post Pt. Physical / Cognitive Limitations: WNL   Procedure End Time:  3:09 PM   D/C Instructions given, states readiness to leave and escorted to car: Yes       Venus Closure  Date/Time: 2/5/2020 3:23 PM  Performed by: German Olvera MD  Authorized by: German Olvera MD     1st Assist:  ROMY Gallardo  Procedure:  VNUS  Procedure side:  Right  Vein Treated:  GSV  Venus Closure  Date/Time: 2/5/2020 3:23 PM  Performed by: German Olvera MD  Authorized by: German Olvera MD     1st Assist:  ROMY Gallardo  Procedure:  VNUS  Procedure side:  Right  Second and Subsequent Vein    Vein Treated:  ASV  Phlebectomy  Date/Time: 2/5/2020 3:23 PM  Performed by: German Olvera MD  Authorized by: German Olvera MD     1st Assist:  ROMY Gallardo  Procedure:  Phlebectomies  Type:  Medically Necessary  Procedure side:  Right  Stabs:  >20      Details of procedure:  Operation the patient in the preop holding with the right leg was marked.  Areas for stab phlebectomy were also marked.  She was then taken the procedure room  in the right lower extremity was prepped and draped in sterile fashion.  A timeout was performed confirm the day and the patient also procedure be performed.  The greater saphenous vein was then mapped over its entire length and an access point was chosen in the mid calf.  The vein was accessed with a micropuncture set and a 7 Icelandic sheath.  Catheter was then passed up and positioned 2.49 cm in the saphenofemoral junction.  We then accessed the anterior accessory saphenous vein and the wire was left in place.  Tumescent solution was placed around the greater saphenous vein and brief radiofrequency ablation was carried out in 7 cm segments.  The catheter and sheath were removed.  The sheath was then inserted into the anterior accessory saphenous vein.  The catheter was then passed up and positioned 2.08 cm from his junction with the greater saphenous vein.  Tumescent solution was placed around the vein.  Radiofrequency ablation was then carried out in 7 cm segments.  The sheath was removed the previously marked areas for staph back with infiltrated local anesthetic.  Multiple stabs were carried out using ophthalmic blade and the veins were removed using a combination of kasi hooks and mosquito clamps for a total 47 steps.  Sterile dressings were applied and the patient was then taken from the procedure and back to the holding area in stable condition to be sent home.      German Olvera MD, FACS

## 2020-02-05 NOTE — NURSING NOTE
Patient purchased 1 pair of black, open toe, thigh high, 20-30 mmhg compression hose from clinic, size III.    China Barnes RN

## 2020-02-05 NOTE — PROGRESS NOTES
Pre-procedure Nursing Note    Ashli Rogers presents to clinic for Vein Procedure  .   /Person Responsible for Patient: Osbaldo (spouse)  Phone Number: [phone]    Prophylactic Medication:Antibiotics, Clindamycin 600mg,   Time Taken: 1200   Sedation Medication: Ativan, 2.5mg ,   Time Taken: 1200 and Clonidine, 0.1mg,   Time Taken: 1200  Compression Stockings: Hose at home  The procedure is being performed on RLE.  Patient understanding of procedure matches consent? YES        China Barnes RN

## 2020-02-07 ENCOUNTER — OFFICE VISIT (OUTPATIENT)
Dept: VASCULAR SURGERY | Facility: CLINIC | Age: 65
End: 2020-02-07
Payer: COMMERCIAL

## 2020-02-07 ENCOUNTER — DOCUMENTATION ONLY (OUTPATIENT)
Dept: SLEEP MEDICINE | Facility: CLINIC | Age: 65
End: 2020-02-07

## 2020-02-07 ENCOUNTER — ANCILLARY PROCEDURE (OUTPATIENT)
Dept: ULTRASOUND IMAGING | Facility: CLINIC | Age: 65
End: 2020-02-07
Attending: SPECIALIST
Payer: COMMERCIAL

## 2020-02-07 DIAGNOSIS — I83.811 VARICOSE VEINS OF RIGHT LOWER EXTREMITY WITH PAIN: Primary | ICD-10-CM

## 2020-02-07 DIAGNOSIS — I83.891 VARICOSE VEINS OF LEG WITH EDEMA, RIGHT: ICD-10-CM

## 2020-02-07 DIAGNOSIS — I83.813 VARICOSE VEINS OF BOTH LOWER EXTREMITIES WITH PAIN: ICD-10-CM

## 2020-02-07 PROCEDURE — 99207 ZZC VEINSOLUTIONS POST OPERATIVE VISIT: CPT | Performed by: SURGERY

## 2020-02-07 PROCEDURE — 93971 EXTREMITY STUDY: CPT | Mod: RT | Performed by: SURGERY

## 2020-02-07 NOTE — PROGRESS NOTES
3 DAY STM VISIT    Diagnostic AHI: 47.5  PSG    Patient contacted for 3 day STM visit    Confirmed with patient at time of call- N/A Patient is still interested in STM service     Message left for patient to return call.    Replacement device: No    STM ordered by provider: No     Device type: Auto-CPAP  PAP settings from order::  CPAP min 5 cm  H20       CPAP max 12 cm  H20        Mask type:    Nasal Mask     Device settings from machine      Min CPAP 5.0            Max CPAP 12.0      Assessment: Nightly usage over four hours.  Action plan: Patient to have 14 day STM visit. Patient has a follow up visit scheduled:   no.     Total time spent on accessing and  interpreting remote patient PAP therapy data  10 minutes  Total time spent counseling, coaching  and reviewing PAP therapy data with patient  1 minutes  [address] no

## 2020-02-07 NOTE — LETTER
2/7/2020         RE: Ashli Rogers  [address] [facility] [address]        Dear Colleague,    Thank you for referring your patient, Ashli Rogers, to the [facility]. Please see a copy of my visit note below.    [facility] 72-hour postop note    Ashli Rogers returns in 72-hour follow-up of radiofrequency ablation of the right great saphenous vein and right anterior extensor saphenous vein with multiple phlebectomies.  Overall she has no complaints, stating that this leg feels better than the left leg did following that procedure.    Physical exam  General: Pleasant female in no acute distress  Extremities: Right lower extremity: There is ecchymosis from the proximal anteromedial right thigh across the anterior thigh extending to the lateral thigh and just lateral to the right knee along the sites of phlebectomies.  There are no hematomas.  There is no significant right ankle swelling.  There is no numbness appreciated.    Venous duplex ultrasound reveals the right great saphenous vein close to 15 mm from the saphenofemoral junction to the proximal calf and the right anterior extensor saphenous vein close 23 mm from the saphenofemoral junction to the proximal thigh.    Impression  Satisfactory progress 72-hour status post the aforementioned procedure.  She will see Dr. Prater for a 6-week postop check.  She understands lifting limitations for 2 weeks and that she is to wear thigh-high compression for 2 weeks following treatment.    RASHEED Bills MD    Again, thank you for allowing me to participate in the care of your patient.        Sincerely,        Reza Bills MD

## 2020-02-07 NOTE — PROGRESS NOTES
[facility] 72-hour postop note    Ashli Rogers returns in 72-hour follow-up of radiofrequency ablation of the right great saphenous vein and right anterior extensor saphenous vein with multiple phlebectomies.  Overall she has no complaints, stating that this leg feels better than the left leg did following that procedure.    Physical exam  General: Pleasant female in no acute distress  Extremities: Right lower extremity: There is ecchymosis from the proximal anteromedial right thigh across the anterior thigh extending to the lateral thigh and just lateral to the right knee along the sites of phlebectomies.  There are no hematomas.  There is no significant right ankle swelling.  There is no numbness appreciated.    Venous duplex ultrasound reveals the right great saphenous vein close to 15 mm from the saphenofemoral junction to the proximal calf and the right anterior extensor saphenous vein close 23 mm from the saphenofemoral junction to the proximal thigh.    Impression  Satisfactory progress 72-hour status post the aforementioned procedure.  She will see Dr. Prater for a 6-week postop check.  She understands lifting limitations for 2 weeks and that she is to wear thigh-high compression for 2 weeks following treatment.    RASHEED Bills MD

## 2020-02-19 ENCOUNTER — DOCUMENTATION ONLY (OUTPATIENT)
Dept: SLEEP MEDICINE | Facility: CLINIC | Age: 65
End: 2020-02-19
Payer: COMMERCIAL

## 2020-02-19 NOTE — PROGRESS NOTES
14  DAY STM VISIT    Diagnostic AHI: 47.5  PSG    Message left for patient to return call.     Assessment: Pt not meeting objective benchmarks for AHI      Action plan: waiting for patient to return call.  and pt to have 30 day STM visit.      Device type: Auto-CPAP    PAP settings: CPAP min 5.0 cm  H20       CPAP max 12.0 cm  H20      95th% pressure 10.9 cm  H20     Mask type:  Nasal Mask    Objective measures: 14 day rolling measures      Compliance  100 %      Leak  22.58 lpm  last  upload      AHI 13.44   last  upload      Average number of minutes 463      Objective measure goal  Compliance   Goal >70%  Leak   Goal < 24 lpm  AHI  Goal < 5  Usage  Goal >240        Total time spent on accessing and interpreting remote patient PAP therapy data  10 minutes    Total time spent counseling, coaching  and reviewing PAP therapy data with patient  1 minutes    [id number]  no  [id number]  no (3 day STM)

## 2020-02-20 ENCOUNTER — E-ADVICE (OUTPATIENT)
Dept: FAMILY MEDICINE | Age: 65
End: 2020-02-20

## 2020-02-22 ENCOUNTER — E-ADVICE (OUTPATIENT)
Dept: FAMILY MEDICINE | Age: 65
End: 2020-02-22

## 2020-02-22 DIAGNOSIS — R93.1 ABNORMAL HEART SCORE CT: Primary | ICD-10-CM

## 2020-02-24 ENCOUNTER — E-ADVICE (OUTPATIENT)
Dept: FAMILY MEDICINE | Age: 65
End: 2020-02-24

## 2020-02-24 DIAGNOSIS — I10 ESSENTIAL HYPERTENSION: ICD-10-CM

## 2020-02-25 RX ORDER — HYDRALAZINE HYDROCHLORIDE 10 MG/1
10 TABLET, FILM COATED ORAL 2 TIMES DAILY
Qty: 180 TABLET | Refills: 0 | Status: SHIPPED | OUTPATIENT
Start: 2020-02-25 | End: 2020-03-09 | Stop reason: DRUGHIGH

## 2020-02-25 RX ORDER — OMEPRAZOLE 40 MG/1
40 CAPSULE, DELAYED RELEASE ORAL DAILY
Qty: 90 CAPSULE | Refills: 1 | Status: SHIPPED | OUTPATIENT
Start: 2020-02-25 | End: 2020-08-20 | Stop reason: DRUGHIGH

## 2020-03-06 ENCOUNTER — DOCUMENTATION ONLY (OUTPATIENT)
Dept: SLEEP MEDICINE | Facility: CLINIC | Age: 65
End: 2020-03-06
Payer: COMMERCIAL

## 2020-03-06 NOTE — PROGRESS NOTES
30 DAY [facility] VISIT    Diagnostic AHI: 47.5  PSG    Message left for patient to return call.    Assessment: Pt meeting objective benchmarks.     Action plan: waiting for patient to return call.   Patient has scheduled a follow up visit with Dr. Wilder on 4/16/2020.   Device type: Auto-CPAP  PAP settings: CPAP min 5.0 cm  H20     CPAP max 12.0 cm  H20    95th% pressure 11.2 cm  H20   Mask type:  Nasal Mask  Objective measures: 14 day rolling measures      Compliance  100 %      Leak  19.12 lpm  last  upload      AHI 4.09   last  upload      Average number of minutes 416      Objective measure goal  Compliance   Goal >70%  Leak   Goal < 24 lpm  AHI  Goal < 5  Usage  Goal >240        Total time spent on accessing and interpreting remote patient PAP therapy data  10 minutes    Total time spent counseling, coaching  and reviewing PAP therapy data with patient  1 minutes     [id number] no this call  [id number] no  at 3 or 14 day [facility]

## 2020-03-09 ENCOUNTER — APPOINTMENT (OUTPATIENT)
Dept: CARDIOLOGY | Age: 65
End: 2020-03-09
Attending: FAMILY MEDICINE

## 2020-03-09 ENCOUNTER — E-ADVICE (OUTPATIENT)
Dept: CARDIOLOGY | Age: 65
End: 2020-03-09

## 2020-03-09 ENCOUNTER — OFFICE VISIT (OUTPATIENT)
Dept: CARDIOLOGY | Age: 65
End: 2020-03-09
Attending: FAMILY MEDICINE

## 2020-03-09 VITALS
HEIGHT: 67 IN | DIASTOLIC BLOOD PRESSURE: 84 MMHG | HEART RATE: 81 BPM | OXYGEN SATURATION: 97 % | RESPIRATION RATE: 16 BRPM | WEIGHT: 233.6 LBS | SYSTOLIC BLOOD PRESSURE: 152 MMHG | BODY MASS INDEX: 36.66 KG/M2

## 2020-03-09 DIAGNOSIS — I10 ESSENTIAL HYPERTENSION: ICD-10-CM

## 2020-03-09 DIAGNOSIS — E66.9 OBESITY (BMI 30-39.9): ICD-10-CM

## 2020-03-09 DIAGNOSIS — R93.1 ELEVATED CORONARY ARTERY CALCIUM SCORE: Primary | ICD-10-CM

## 2020-03-09 DIAGNOSIS — E78.5 DYSLIPIDEMIA: ICD-10-CM

## 2020-03-09 PROCEDURE — 99205 OFFICE O/P NEW HI 60 MIN: CPT | Performed by: INTERNAL MEDICINE

## 2020-03-09 PROCEDURE — 93000 ELECTROCARDIOGRAM COMPLETE: CPT | Performed by: INTERNAL MEDICINE

## 2020-03-09 RX ORDER — HYDRALAZINE HYDROCHLORIDE 10 MG/1
10 TABLET, FILM COATED ORAL 2 TIMES DAILY
Qty: 180 TABLET | Refills: 0 | Status: SHIPPED | OUTPATIENT
Start: 2020-03-09 | End: 2020-03-10 | Stop reason: SDUPTHER

## 2020-03-09 SDOH — HEALTH STABILITY: MENTAL HEALTH: HOW OFTEN DO YOU HAVE A DRINK CONTAINING ALCOHOL?: NEVER

## 2020-03-10 RX ORDER — HYDRALAZINE HYDROCHLORIDE 10 MG/1
10 TABLET, FILM COATED ORAL 2 TIMES DAILY
Qty: 180 TABLET | Refills: 0 | Status: SHIPPED | OUTPATIENT
Start: 2020-03-10 | End: 2020-06-01 | Stop reason: ALTCHOICE

## 2020-03-15 ENCOUNTER — HEALTH MAINTENANCE LETTER (OUTPATIENT)
Age: 65
End: 2020-03-15

## 2020-03-17 ENCOUNTER — E-ADVICE (OUTPATIENT)
Dept: CARDIOLOGY | Age: 65
End: 2020-03-17

## 2020-03-26 ENCOUNTER — VIRTUAL VISIT (OUTPATIENT)
Dept: SURGERY | Facility: CLINIC | Age: 65
End: 2020-03-26
Payer: COMMERCIAL

## 2020-03-26 DIAGNOSIS — Z98.890 POST-OPERATIVE STATE: Primary | ICD-10-CM

## 2020-03-26 PROCEDURE — 99441 ZZC PHYSICIAN TELEPHONE EVALUATION 5-10 MIN: CPT | Performed by: SPECIALIST

## 2020-03-26 NOTE — PROGRESS NOTES
"Ashli Rogers is a 65 year old female who is being evaluated via a billable telephone visit.      The patient has been notified of following:     \"This telephone visit will be conducted via a call between you and your physician/provider. We have found that certain health care needs can be provided without the need for a physical exam.  This service lets us provide the care you need with a short phone conversation.  If a prescription is necessary we can send it directly to your pharmacy.  If lab work is needed we can place an order for that and you can then stop by our lab to have the test done at a later time.    If during the course of the call the physician/provider feels a telephone visit is not appropriate, you will not be charged for this service.\"     Ashli Rogers complains of    Chief Complaint   Patient presents with     Follow Up       I have reviewed and updated the patient's Past Medical History, Social History, Family History and Medication List.    ALLERGIES  Cephalexin monohydrate; Gluten meal; and Warfarin sodium    Additional provider notes:     Pt is s/p a right AAV and GSV RF ablation with phlebectomies.  Doing very well.  Pre-op sx resolved.  Incisions healing well.  Nerve sensitivity much improved.      Assessment/Plan:  1. Post-operative state    Plan- gradually increase activity as tolerated.  Wear compression socks while traveling.  F/U PRN.        Phone call duration: 5 minutes    German Olvera MD, FACS        "

## 2020-03-31 ENCOUNTER — DOCUMENTATION ONLY (OUTPATIENT)
Dept: SLEEP MEDICINE | Facility: CLINIC | Age: 65
End: 2020-03-31

## 2020-03-31 ENCOUNTER — VIRTUAL VISIT (OUTPATIENT)
Dept: SLEEP MEDICINE | Facility: CLINIC | Age: 65
End: 2020-03-31
Payer: COMMERCIAL

## 2020-03-31 DIAGNOSIS — G47.33 OSA (OBSTRUCTIVE SLEEP APNEA): Primary | ICD-10-CM

## 2020-03-31 PROCEDURE — 99213 OFFICE O/P EST LOW 20 MIN: CPT | Mod: TEL | Performed by: OTOLARYNGOLOGY

## 2020-03-31 NOTE — PROGRESS NOTES
STM Recheck: Patient called and is looking to get a new mask cushion. Sent message to [facility].

## 2020-03-31 NOTE — NURSING NOTE
Does Ashli have a CPAP/Bipap?  Yes             Type of mask: nasal     [facility]: [facility] [phone]    [email or web address]     Consent has been obtained for this service by a care team member: Yes      [place] sleep scale: 9

## 2020-03-31 NOTE — PROGRESS NOTES
Obstructive Sleep Apnea - PAP Follow-Up Visit:    Chief Complaint   Patient presents with     CPAP Follow Up       Ashli Rogers comes in today for follow-up of their severe sleep apnea, managed with CPAP.     No specialty comments available.    Overall, she rates the experience with PAP as 10 (0 poor, 10 great). The mask is comfortable.    The mask is leaking at her nose.  The mask is leaking few nights per week.  She is not snoring with the mask on. She is not having gasp arousals.  She is not having significant oral/nasal dryness. The pressure is comfortable.   Her PAP interface is Nasal Mask.    The patient is usually getting 8 hours of sleep per night.    She does feel rested in the morning.    [place] Sleepiness Scale: 9/24      No data recorded    ResMed     Auto-PAP 5.0 - 12.0 cmH2O 30 day usage data:    93% of days with > 4 hours of use. 0/30 days with no use.   Average use 388 minutes per day.   95%ile Leak 21.17 L/min.   CPAP 95% pressure 11 cm.   AHI 6.42 events per hour.         Past medical/surgical history, family history, social history, medications and allergies were reviewed.      Problem List:  Patient Active Problem List    Diagnosis Date Noted     Bunion, left 08/01/2019     Priority: Medium     Pain due to varicose veins of both lower extremities 08/01/2019     Priority: Medium     Diverticulitis of colon 02/03/2017     Priority: Medium     Morbid obesity due to excess calories (H) 07/05/2016     Priority: Medium     Hiatal hernia 07/05/2016     Priority: Medium     Gastroesophageal reflux disease, esophagitis presence not specified 03/18/2016     Priority: Medium     ADD (attention deficit disorder) 03/03/2013     Priority: Medium     On betseygerson and is being followed by a mental health specialist        Advanced directives, counseling/discussion 10/28/2011     Priority: Medium     Pt is going to take home the packet and is going to look at it.  MP/MA       DJD (degenerative joint disease) of  knee 05/24/2011     Priority: Medium     Allergic rhinitis 11/13/2006     Priority: Medium     Problem list name updated by automated process. Provider to review       Varicose veins of lower extremities with complications 09/28/2005     Priority: Medium     Problem list name updated by automated process. Provider to review       S/P total hip arthroplasty 04/04/2003     Priority: Medium        LMP 01/19/2005     Impression/Plan:  Patient is severe obstructive sleep apnea well managed with CPAP she is not optimally titrated currently but titration quality is good.  Severe Sleep apnea.  Tolerating PAP well. Daytime symptoms are improved.   At this point will change her pressure settings from 5 to 12 cm of water to a range of 8-13 hopefully decreasing some of the leak with a small arrange as well as hopefully improving her AHI.     Ashli Rogers will follow up in about 2 month(s) via tele-visit after another download to make sure that her numbers improving further.     Fifteen minutes spent with patient, all of which were spent in virtual counseling, consulting, coordinating plan of care.      CC:  Lakisha Wynne, Mike Wilder MD

## 2020-04-02 ENCOUNTER — APPOINTMENT (OUTPATIENT)
Dept: CARDIOLOGY | Age: 65
End: 2020-04-02
Attending: INTERNAL MEDICINE

## 2020-04-06 RX ORDER — LOSARTAN POTASSIUM 100 MG/1
TABLET ORAL
Qty: 90 TABLET | Refills: 0 | Status: SHIPPED | OUTPATIENT
Start: 2020-04-06 | End: 2020-07-08

## 2020-04-06 RX ORDER — HYDROCHLOROTHIAZIDE 25 MG/1
TABLET ORAL
Qty: 90 TABLET | Refills: 0 | Status: SHIPPED | OUTPATIENT
Start: 2020-04-06 | End: 2020-05-28 | Stop reason: ALTCHOICE

## 2020-04-13 ENCOUNTER — E-ADVICE (OUTPATIENT)
Dept: CARDIOLOGY | Age: 65
End: 2020-04-13

## 2020-04-14 ENCOUNTER — TELEPHONE (OUTPATIENT)
Dept: CARDIOLOGY | Age: 65
End: 2020-04-14

## 2020-04-14 SDOH — HEALTH STABILITY: MENTAL HEALTH: HOW OFTEN DO YOU HAVE A DRINK CONTAINING ALCOHOL?: NEVER

## 2020-04-15 ENCOUNTER — TELEPHONE (OUTPATIENT)
Dept: SCHEDULING | Age: 65
End: 2020-04-15

## 2020-04-16 ENCOUNTER — APPOINTMENT (OUTPATIENT)
Dept: ALLERGY | Age: 65
End: 2020-04-16

## 2020-04-16 ENCOUNTER — OFFICE VISIT (OUTPATIENT)
Dept: ALLERGY | Age: 65
End: 2020-04-16

## 2020-04-16 ENCOUNTER — TELEPHONE (OUTPATIENT)
Dept: ALLERGY | Age: 65
End: 2020-04-16

## 2020-04-16 VITALS — HEIGHT: 67 IN | WEIGHT: 230 LBS | BODY MASS INDEX: 36.1 KG/M2

## 2020-04-16 DIAGNOSIS — J30.1 ALLERGIC RHINITIS DUE TO POLLEN, UNSPECIFIED SEASONALITY: ICD-10-CM

## 2020-04-16 DIAGNOSIS — R50.9 FEVER, UNSPECIFIED FEVER CAUSE: ICD-10-CM

## 2020-04-16 DIAGNOSIS — B34.9 VIRAL ILLNESS: ICD-10-CM

## 2020-04-16 DIAGNOSIS — J45.31 MILD PERSISTENT ASTHMA WITH ACUTE EXACERBATION: Primary | ICD-10-CM

## 2020-04-16 PROCEDURE — 99443 TELEPHONE E&M BY PHYSICIAN EST PT NOT ORIG PREV 7 DAYS 21-30 MIN: CPT | Performed by: ALLERGY & IMMUNOLOGY

## 2020-04-16 ASSESSMENT — ENCOUNTER SYMPTOMS
UNEXPECTED WEIGHT CHANGE: 0
SINUS PAIN: 0
FEVER: 1
SHORTNESS OF BREATH: 0
RHINORRHEA: 0
SINUS PRESSURE: 1
COUGH: 0
CHEST TIGHTNESS: 1

## 2020-04-21 ENCOUNTER — OFFICE VISIT (OUTPATIENT)
Dept: CARDIOLOGY | Age: 65
End: 2020-04-21
Attending: FAMILY MEDICINE

## 2020-04-21 ENCOUNTER — TELEPHONE (OUTPATIENT)
Dept: CARDIOLOGY | Age: 65
End: 2020-04-21

## 2020-04-21 DIAGNOSIS — R93.1 ELEVATED CORONARY ARTERY CALCIUM SCORE: Primary | ICD-10-CM

## 2020-04-21 DIAGNOSIS — E78.5 DYSLIPIDEMIA: ICD-10-CM

## 2020-04-21 DIAGNOSIS — E66.9 OBESITY (BMI 30-39.9): ICD-10-CM

## 2020-04-21 DIAGNOSIS — R07.89 CHEST TIGHTNESS: ICD-10-CM

## 2020-04-21 DIAGNOSIS — R06.02 SOB (SHORTNESS OF BREATH): Primary | ICD-10-CM

## 2020-04-21 DIAGNOSIS — I10 ESSENTIAL HYPERTENSION: ICD-10-CM

## 2020-04-21 PROCEDURE — 99442 TELEPHONE E&M BY PHYSICIAN EST PT NOT ORIG PREV 7 DAYS 11-20 MIN: CPT | Performed by: INTERNAL MEDICINE

## 2020-04-22 ENCOUNTER — IMAGING SERVICES (OUTPATIENT)
Dept: GENERAL RADIOLOGY | Age: 65
End: 2020-04-22
Attending: INTERNAL MEDICINE

## 2020-04-22 DIAGNOSIS — R06.02 SOB (SHORTNESS OF BREATH): ICD-10-CM

## 2020-04-22 DIAGNOSIS — R07.89 CHEST TIGHTNESS: ICD-10-CM

## 2020-04-22 PROCEDURE — 71046 X-RAY EXAM CHEST 2 VIEWS: CPT | Performed by: RADIOLOGY

## 2020-04-22 ASSESSMENT — ENCOUNTER SYMPTOMS
DIZZINESS: 0
CHILLS: 0
SHORTNESS OF BREATH: 1
ORTHOPNEA: 0
FEVER: 1

## 2020-04-24 ENCOUNTER — TELEPHONE (OUTPATIENT)
Dept: CARDIOLOGY | Age: 65
End: 2020-04-24

## 2020-04-24 ENCOUNTER — E-ADVICE (OUTPATIENT)
Dept: CARDIOLOGY | Age: 65
End: 2020-04-24

## 2020-04-24 RX ORDER — METHYLPREDNISOLONE 4 MG/1
4 TABLET ORAL SEE ADMIN INSTRUCTIONS
Qty: 21 TABLET | Refills: 0 | Status: SHIPPED | OUTPATIENT
Start: 2020-04-24 | End: 2020-05-14 | Stop reason: ALTCHOICE

## 2020-04-28 ENCOUNTER — TELEPHONE (OUTPATIENT)
Dept: CARDIOLOGY | Age: 65
End: 2020-04-28

## 2020-04-28 ENCOUNTER — TELEPHONE (OUTPATIENT)
Dept: FAMILY MEDICINE | Age: 65
End: 2020-04-28

## 2020-04-28 RX ORDER — AZITHROMYCIN 250 MG/1
TABLET, FILM COATED ORAL
Qty: 6 TABLET | Refills: 0 | Status: SHIPPED | OUTPATIENT
Start: 2020-04-28 | End: 2020-05-03

## 2020-05-04 ENCOUNTER — E-ADVICE (OUTPATIENT)
Dept: CARDIOLOGY | Age: 65
End: 2020-05-04

## 2020-05-14 ENCOUNTER — OFFICE VISIT (OUTPATIENT)
Dept: FAMILY MEDICINE | Age: 65
End: 2020-05-14

## 2020-05-14 VITALS
BODY MASS INDEX: 36.02 KG/M2 | HEART RATE: 57 BPM | DIASTOLIC BLOOD PRESSURE: 63 MMHG | SYSTOLIC BLOOD PRESSURE: 122 MMHG | WEIGHT: 230 LBS

## 2020-05-14 DIAGNOSIS — Z20.822 ENCOUNTER FOR LABORATORY TESTING FOR COVID-19 VIRUS: ICD-10-CM

## 2020-05-14 DIAGNOSIS — I10 ESSENTIAL HYPERTENSION: ICD-10-CM

## 2020-05-14 DIAGNOSIS — J45.41 MODERATE PERSISTENT ASTHMA WITH ACUTE EXACERBATION: ICD-10-CM

## 2020-05-14 DIAGNOSIS — Z01.84 IMMUNITY STATUS TESTING: ICD-10-CM

## 2020-05-14 DIAGNOSIS — J40 BRONCHITIS: Primary | ICD-10-CM

## 2020-05-14 PROCEDURE — 99442 TELEPHONE E&M BY PHYSICIAN EST PT NOT ORIG PREV 7 DAYS 11-20 MIN: CPT | Performed by: FAMILY MEDICINE

## 2020-05-14 RX ORDER — PREDNISONE 20 MG/1
TABLET ORAL
Qty: 10 TABLET | Refills: 0 | Status: SHIPPED | OUTPATIENT
Start: 2020-05-14 | End: 2020-06-01 | Stop reason: ALTCHOICE

## 2020-05-14 RX ORDER — FLUTICASONE PROPIONATE AND SALMETEROL 250; 50 UG/1; UG/1
1 POWDER RESPIRATORY (INHALATION)
Qty: 1 EACH | Refills: 5 | Status: SHIPPED | COMMUNITY
Start: 2020-05-14 | End: 2020-05-20 | Stop reason: DRUGHIGH

## 2020-05-14 ASSESSMENT — PATIENT HEALTH QUESTIONNAIRE - PHQ9
2. FEELING DOWN, DEPRESSED OR HOPELESS: SEVERAL DAYS
1. LITTLE INTEREST OR PLEASURE IN DOING THINGS: SEVERAL DAYS
SUM OF ALL RESPONSES TO PHQ9 QUESTIONS 1 AND 2: 2
SUM OF ALL RESPONSES TO PHQ9 QUESTIONS 1 AND 2: 2

## 2020-05-18 ENCOUNTER — TELEPHONE (OUTPATIENT)
Dept: FAMILY MEDICINE | Age: 65
End: 2020-05-18

## 2020-05-19 ENCOUNTER — E-ADVICE (OUTPATIENT)
Dept: CARDIOLOGY | Age: 65
End: 2020-05-19

## 2020-05-19 ENCOUNTER — WALK IN (OUTPATIENT)
Dept: URGENT CARE | Age: 65
End: 2020-05-19

## 2020-05-19 ENCOUNTER — IMAGING SERVICES (OUTPATIENT)
Dept: GENERAL RADIOLOGY | Age: 65
End: 2020-05-19
Attending: FAMILY MEDICINE

## 2020-05-19 VITALS
HEART RATE: 68 BPM | SYSTOLIC BLOOD PRESSURE: 156 MMHG | OXYGEN SATURATION: 99 % | DIASTOLIC BLOOD PRESSURE: 84 MMHG | TEMPERATURE: 98.3 F | RESPIRATION RATE: 20 BRPM

## 2020-05-19 DIAGNOSIS — Z20.822 SUSPECTED COVID-19 VIRUS INFECTION: Primary | ICD-10-CM

## 2020-05-19 DIAGNOSIS — Z20.822 SUSPECTED COVID-19 VIRUS INFECTION: ICD-10-CM

## 2020-05-19 LAB
SARS-COV-2 IGG SERPL QL IA: NEGATIVE
SARS-COV-2 N IGG SERPL QL IA: 0.28

## 2020-05-19 PROCEDURE — 36415 COLL VENOUS BLD VENIPUNCTURE: CPT | Performed by: FAMILY MEDICINE

## 2020-05-19 PROCEDURE — 99214 OFFICE O/P EST MOD 30 MIN: CPT | Performed by: FAMILY MEDICINE

## 2020-05-19 PROCEDURE — 71046 X-RAY EXAM CHEST 2 VIEWS: CPT | Performed by: RADIOLOGY

## 2020-05-19 ASSESSMENT — ENCOUNTER SYMPTOMS
WHEEZING: 1
COUGH: 1
SHORTNESS OF BREATH: 1

## 2020-05-20 ENCOUNTER — TELEPHONE (OUTPATIENT)
Dept: FAMILY MEDICINE | Age: 65
End: 2020-05-20

## 2020-05-20 DIAGNOSIS — J45.41 MODERATE PERSISTENT ASTHMA WITH ACUTE EXACERBATION: Primary | ICD-10-CM

## 2020-05-21 RX ORDER — FLUTICASONE PROPIONATE AND SALMETEROL 500; 50 UG/1; UG/1
1 POWDER RESPIRATORY (INHALATION)
Qty: 60 EACH | Refills: 1 | Status: SHIPPED | OUTPATIENT
Start: 2020-05-21 | End: 2020-07-16 | Stop reason: ALTCHOICE

## 2020-05-22 ENCOUNTER — IMAGING SERVICES (OUTPATIENT)
Dept: OTHER | Age: 65
End: 2020-05-22

## 2020-05-22 ENCOUNTER — OFFICE VISIT (OUTPATIENT)
Dept: ALLERGY | Age: 65
End: 2020-05-22

## 2020-05-22 ENCOUNTER — LAB SERVICES (OUTPATIENT)
Dept: LAB | Age: 65
End: 2020-05-22

## 2020-05-22 ENCOUNTER — TELEPHONE (OUTPATIENT)
Dept: ALLERGY | Age: 65
End: 2020-05-22

## 2020-05-22 VITALS — WEIGHT: 230 LBS | BODY MASS INDEX: 36.1 KG/M2 | HEIGHT: 67 IN

## 2020-05-22 DIAGNOSIS — R06.02 SHORTNESS OF BREATH: Primary | ICD-10-CM

## 2020-05-22 DIAGNOSIS — R06.02 SHORTNESS OF BREATH: ICD-10-CM

## 2020-05-22 DIAGNOSIS — J30.1 ALLERGIC RHINITIS DUE TO POLLEN, UNSPECIFIED SEASONALITY: ICD-10-CM

## 2020-05-22 DIAGNOSIS — J45.31 MILD PERSISTENT ASTHMA WITH ACUTE EXACERBATION: ICD-10-CM

## 2020-05-22 LAB
BASOPHIL %: 0.2 % (ref 0–1.2)
BASOPHIL ABSOLUTE #: 0 10*3/UL (ref 0–0.1)
D DIMER PPP IA.DDU-MCNC: 408 NG/ML (ref 0–400)
DIFFERENTIAL TYPE: ABNORMAL
EOSINOPHIL %: 0.8 % (ref 0–10)
EOSINOPHIL ABSOLUTE #: 0.1 10*3/UL (ref 0–0.5)
HEMATOCRIT: 39.2 % (ref 34–45)
HEMOGLOBIN: 12.4 G/DL (ref 11.2–15.7)
LYMPH PERCENT: 19.4 % (ref 20.5–51.1)
LYMPHOCYTE ABSOLUTE #: 1.7 10*3/UL (ref 1.2–3.4)
MEAN CORPUSCULAR HGB CONCENTRATION: 31.6 % (ref 32–36)
MEAN CORPUSCULAR HGB: 30.2 PG (ref 27–34)
MEAN CORPUSCULAR VOLUME: 95.4 FL (ref 79–95)
MEAN PLATELET VOLUME: 10.2 FL (ref 8.6–12.4)
MONOCYTE ABSOLUTE #: 0.9 10*3/UL (ref 0.2–0.9)
MONOCYTE PERCENT: 9.7 % (ref 4.3–12.9)
NEUTROPHIL ABSOLUTE #: 6.2 10*3/UL (ref 1.4–6.5)
NEUTROPHIL PERCENT: 69.9 % (ref 34–73.5)
PLATELET COUNT: 306 10*3/UL (ref 150–400)
RED BLOOD CELL COUNT: 4.11 10*6/UL (ref 3.7–5.2)
RED CELL DISTRIBUTION WIDTH: 14.9 % (ref 11.3–14.8)
SEDIMENTATION RATE, RBC: 20 MM/H (ref 0–20)
WHITE BLOOD CELL COUNT: 8.9 10*3/UL (ref 4–10)

## 2020-05-22 PROCEDURE — 93010 ELECTROCARDIOGRAM REPORT: CPT | Performed by: INTERNAL MEDICINE

## 2020-05-22 PROCEDURE — 99443 TELEPHONE E&M BY PHYSICIAN EST PT NOT ORIG PREV 7 DAYS 21-30 MIN: CPT | Performed by: ALLERGY & IMMUNOLOGY

## 2020-05-22 PROCEDURE — 85025 COMPLETE CBC W/AUTO DIFF WBC: CPT | Performed by: ALLERGY & IMMUNOLOGY

## 2020-05-22 PROCEDURE — 36415 COLL VENOUS BLD VENIPUNCTURE: CPT | Performed by: ALLERGY & IMMUNOLOGY

## 2020-05-22 PROCEDURE — 85379 FIBRIN DEGRADATION QUANT: CPT | Performed by: ALLERGY & IMMUNOLOGY

## 2020-05-22 PROCEDURE — 85652 RBC SED RATE AUTOMATED: CPT | Performed by: ALLERGY & IMMUNOLOGY

## 2020-05-22 ASSESSMENT — ENCOUNTER SYMPTOMS
SINUS PRESSURE: 0
CHEST TIGHTNESS: 1
FEVER: 0
RHINORRHEA: 0
SHORTNESS OF BREATH: 1
COUGH: 0
SINUS PAIN: 0
UNEXPECTED WEIGHT CHANGE: 0

## 2020-05-23 ENCOUNTER — OFF PREMISE (OUTPATIENT)
Dept: HEALTH INFORMATION MANAGEMENT | Facility: OTHER | Age: 65
End: 2020-05-23

## 2020-05-23 ENCOUNTER — EXTERNAL RECORD (OUTPATIENT)
Dept: HEALTH INFORMATION MANAGEMENT | Facility: OTHER | Age: 65
End: 2020-05-23

## 2020-05-23 PROCEDURE — 99215 OFFICE O/P EST HI 40 MIN: CPT | Performed by: INTERNAL MEDICINE

## 2020-05-23 PROCEDURE — 93018 CV STRESS TEST I&R ONLY: CPT | Performed by: INTERNAL MEDICINE

## 2020-05-23 PROCEDURE — 99236 HOSP IP/OBS SAME DATE HI 85: CPT | Performed by: HOSPITALIST

## 2020-05-23 PROCEDURE — 93306 TTE W/DOPPLER COMPLETE: CPT | Performed by: INTERNAL MEDICINE

## 2020-05-23 PROCEDURE — 78452 HT MUSCLE IMAGE SPECT MULT: CPT | Performed by: INTERNAL MEDICINE

## 2020-05-23 PROCEDURE — 93016 CV STRESS TEST SUPVJ ONLY: CPT | Performed by: INTERNAL MEDICINE

## 2020-05-25 ENCOUNTER — TELEPHONE (OUTPATIENT)
Dept: CARDIOLOGY | Age: 65
End: 2020-05-25

## 2020-05-25 DIAGNOSIS — R06.02 SOB (SHORTNESS OF BREATH): ICD-10-CM

## 2020-05-25 DIAGNOSIS — R07.89 CHEST TIGHTNESS: Primary | ICD-10-CM

## 2020-05-26 ENCOUNTER — TELEPHONE (OUTPATIENT)
Dept: FAMILY MEDICINE | Age: 65
End: 2020-05-26

## 2020-05-26 LAB
ISOLATION GUIDELINES: NORMAL
SARS-COV-2 RNA SPEC QL NAA+PROBE: NOT DETECTED
SPECIMEN SOURCE: NORMAL

## 2020-05-27 ENCOUNTER — E-ADVICE (OUTPATIENT)
Dept: FAMILY MEDICINE | Age: 65
End: 2020-05-27

## 2020-05-28 ENCOUNTER — TELEPHONE (OUTPATIENT)
Dept: FAMILY MEDICINE | Age: 65
End: 2020-05-28

## 2020-05-28 DIAGNOSIS — Z79.899 MEDICATION MANAGEMENT: Primary | ICD-10-CM

## 2020-05-28 RX ORDER — FUROSEMIDE 40 MG/1
40 TABLET ORAL DAILY
Qty: 30 TABLET | Refills: 0 | Status: SHIPPED | OUTPATIENT
Start: 2020-05-28 | End: 2020-06-01 | Stop reason: SDUPTHER

## 2020-05-29 ENCOUNTER — OFFICE VISIT (OUTPATIENT)
Dept: CARDIOLOGY | Age: 65
End: 2020-05-29

## 2020-05-29 ENCOUNTER — E-ADVICE (OUTPATIENT)
Dept: FAMILY MEDICINE | Age: 65
End: 2020-05-29

## 2020-05-29 DIAGNOSIS — I10 ESSENTIAL HYPERTENSION: Primary | ICD-10-CM

## 2020-05-29 DIAGNOSIS — J45.40 MODERATE PERSISTENT ASTHMA WITHOUT COMPLICATION: ICD-10-CM

## 2020-05-29 DIAGNOSIS — E78.5 DYSLIPIDEMIA: ICD-10-CM

## 2020-05-29 DIAGNOSIS — R06.02 SOB (SHORTNESS OF BREATH): ICD-10-CM

## 2020-05-29 DIAGNOSIS — Z82.49 FAMILY HISTORY OF EARLY CAD: ICD-10-CM

## 2020-05-29 DIAGNOSIS — E66.9 OBESITY (BMI 30-39.9): ICD-10-CM

## 2020-05-29 DIAGNOSIS — R93.1 ELEVATED CORONARY ARTERY CALCIUM SCORE: ICD-10-CM

## 2020-05-29 PROBLEM — J45.909 MODERATE ASTHMA WITHOUT COMPLICATION: Status: ACTIVE | Noted: 2020-05-29

## 2020-05-29 PROCEDURE — 99443 TELEPHONE E&M BY PHYSICIAN EST PT NOT ORIG PREV 7 DAYS 21-30 MIN: CPT | Performed by: NURSE PRACTITIONER

## 2020-05-29 SDOH — HEALTH STABILITY: MENTAL HEALTH: HOW OFTEN DO YOU HAVE A DRINK CONTAINING ALCOHOL?: NEVER

## 2020-06-01 ENCOUNTER — OFFICE VISIT (OUTPATIENT)
Dept: FAMILY MEDICINE | Age: 65
End: 2020-06-01

## 2020-06-01 ENCOUNTER — LAB SERVICES (OUTPATIENT)
Dept: LAB | Age: 65
End: 2020-06-01

## 2020-06-01 VITALS
OXYGEN SATURATION: 99 % | SYSTOLIC BLOOD PRESSURE: 119 MMHG | DIASTOLIC BLOOD PRESSURE: 64 MMHG | BODY MASS INDEX: 36.02 KG/M2 | HEART RATE: 63 BPM | WEIGHT: 230 LBS

## 2020-06-01 DIAGNOSIS — I10 ESSENTIAL HYPERTENSION: Primary | ICD-10-CM

## 2020-06-01 DIAGNOSIS — R93.1 ELEVATED CORONARY ARTERY CALCIUM SCORE: ICD-10-CM

## 2020-06-01 DIAGNOSIS — I10 ESSENTIAL HYPERTENSION: ICD-10-CM

## 2020-06-01 DIAGNOSIS — N28.9 KIDNEY LESION: ICD-10-CM

## 2020-06-01 DIAGNOSIS — Z82.49 FAMILY HISTORY OF EARLY CAD: ICD-10-CM

## 2020-06-01 DIAGNOSIS — Z79.899 MEDICATION MANAGEMENT: ICD-10-CM

## 2020-06-01 DIAGNOSIS — J45.40 MODERATE PERSISTENT ASTHMA WITHOUT COMPLICATION: ICD-10-CM

## 2020-06-01 DIAGNOSIS — R06.02 SHORTNESS OF BREATH ON EXERTION: ICD-10-CM

## 2020-06-01 DIAGNOSIS — E78.5 DYSLIPIDEMIA: ICD-10-CM

## 2020-06-01 DIAGNOSIS — R06.02 SOB (SHORTNESS OF BREATH): ICD-10-CM

## 2020-06-01 DIAGNOSIS — E66.9 OBESITY (BMI 30-39.9): ICD-10-CM

## 2020-06-01 LAB
BNP BLD-MCNC: <5 PG/ML (ref 0–100)
BUN SERPL-MCNC: 21 MG/DL (ref 7–20)
CALCIUM SERPL-MCNC: 10.3 MG/DL (ref 8.6–10.6)
CHLORIDE SERPL-SCNC: 102 MMOL/L (ref 96–107)
CO2 SERPL-SCNC: 31 MMOL/L (ref 22–32)
CREAT SERPL-MCNC: 0.8 MG/DL (ref 0.5–1.4)
CRP SERPL-MCNC: <0.5 MG/DL (ref 0–1)
GFR SERPL CREATININE-BSD FRML MDRD: >60 ML/MIN/{1.73M2}
GFR SERPL CREATININE-BSD FRML MDRD: >60 ML/MIN/{1.73M2}
GLUCOSE SERPL-MCNC: 103 MG/DL (ref 70–200)
POTASSIUM SERPL-SCNC: 5 MMOL/L (ref 3.5–5.3)
SEDIMENTATION RATE, RBC: 11 MM/H (ref 0–20)
SODIUM SERPL-SCNC: 141 MMOL/L (ref 136–146)

## 2020-06-01 PROCEDURE — 99442 TELEPHONE E&M BY PHYSICIAN EST PT NOT ORIG PREV 7 DAYS 11-20 MIN: CPT | Performed by: FAMILY MEDICINE

## 2020-06-01 PROCEDURE — 36415 COLL VENOUS BLD VENIPUNCTURE: CPT | Performed by: NURSE PRACTITIONER

## 2020-06-01 PROCEDURE — 80048 BASIC METABOLIC PNL TOTAL CA: CPT | Performed by: NURSE PRACTITIONER

## 2020-06-01 PROCEDURE — 85652 RBC SED RATE AUTOMATED: CPT | Performed by: NURSE PRACTITIONER

## 2020-06-01 PROCEDURE — 86140 C-REACTIVE PROTEIN: CPT | Performed by: NURSE PRACTITIONER

## 2020-06-01 PROCEDURE — 83880 ASSAY OF NATRIURETIC PEPTIDE: CPT | Performed by: NURSE PRACTITIONER

## 2020-06-01 RX ORDER — FUROSEMIDE 40 MG/1
60 TABLET ORAL DAILY
Qty: 30 TABLET | Refills: 0 | Status: SHIPPED | COMMUNITY
Start: 2020-06-01 | End: 2020-06-16 | Stop reason: ALTCHOICE

## 2020-06-01 SDOH — HEALTH STABILITY: MENTAL HEALTH: HOW OFTEN DO YOU HAVE A DRINK CONTAINING ALCOHOL?: NEVER

## 2020-06-01 ASSESSMENT — PATIENT HEALTH QUESTIONNAIRE - PHQ9
1. LITTLE INTEREST OR PLEASURE IN DOING THINGS: SEVERAL DAYS
CLINICAL INTERPRETATION OF PHQ9 SCORE: NO FURTHER SCREENING NEEDED
SUM OF ALL RESPONSES TO PHQ9 QUESTIONS 1 AND 2: 1
CLINICAL INTERPRETATION OF PHQ2 SCORE: NO FURTHER SCREENING NEEDED
SUM OF ALL RESPONSES TO PHQ9 QUESTIONS 1 AND 2: 1
2. FEELING DOWN, DEPRESSED OR HOPELESS: NOT AT ALL

## 2020-06-03 ENCOUNTER — E-ADVICE (OUTPATIENT)
Dept: FAMILY MEDICINE | Age: 65
End: 2020-06-03

## 2020-06-03 DIAGNOSIS — R06.02 SHORTNESS OF BREATH ON EXERTION: Primary | ICD-10-CM

## 2020-06-04 ENCOUNTER — TELEPHONE (OUTPATIENT)
Dept: CARDIOLOGY | Age: 65
End: 2020-06-04

## 2020-06-12 ENCOUNTER — E-ADVICE (OUTPATIENT)
Dept: CARDIOLOGY | Age: 65
End: 2020-06-12

## 2020-06-12 ENCOUNTER — OFFICE VISIT (OUTPATIENT)
Dept: PULMONOLOGY | Age: 65
End: 2020-06-12
Attending: INTERNAL MEDICINE

## 2020-06-12 DIAGNOSIS — R07.89 CHEST TIGHTNESS: ICD-10-CM

## 2020-06-12 DIAGNOSIS — R06.2 WHEEZING: Primary | ICD-10-CM

## 2020-06-12 PROCEDURE — 99204 OFFICE O/P NEW MOD 45 MIN: CPT | Performed by: INTERNAL MEDICINE

## 2020-06-12 RX ORDER — PREDNISONE 10 MG/1
TABLET ORAL
Qty: 30 TABLET | Refills: 0 | Status: SHIPPED | OUTPATIENT
Start: 2020-06-12 | End: 2020-06-24 | Stop reason: ALTCHOICE

## 2020-06-12 RX ORDER — LORATADINE 10 MG/1
10 TABLET ORAL DAILY
Qty: 30 TABLET | Refills: 0 | Status: SHIPPED | OUTPATIENT
Start: 2020-06-12 | End: 2020-06-24 | Stop reason: CLARIF

## 2020-06-12 SDOH — HEALTH STABILITY: MENTAL HEALTH: HOW OFTEN DO YOU HAVE A DRINK CONTAINING ALCOHOL?: NEVER

## 2020-06-12 ASSESSMENT — PAIN SCALES - GENERAL: PAINLEVEL: 0

## 2020-06-16 RX ORDER — HYDROCHLOROTHIAZIDE 25 MG/1
25 TABLET ORAL DAILY
Qty: 90 TABLET | Refills: 0 | Status: SHIPPED | OUTPATIENT
Start: 2020-06-16 | End: 2020-07-01 | Stop reason: DRUGHIGH

## 2020-06-18 ENCOUNTER — TELEPHONE (OUTPATIENT)
Dept: PULMONOLOGY | Age: 65
End: 2020-06-18

## 2020-06-18 ENCOUNTER — TELEPHONE (OUTPATIENT)
Dept: CARDIOLOGY | Age: 65
End: 2020-06-18

## 2020-06-24 ENCOUNTER — OFFICE VISIT (OUTPATIENT)
Dept: CARDIOLOGY | Age: 65
End: 2020-06-24

## 2020-06-24 ENCOUNTER — PREP FOR CASE (OUTPATIENT)
Dept: CARDIOLOGY | Age: 65
End: 2020-06-24

## 2020-06-24 VITALS — BODY MASS INDEX: 35.47 KG/M2 | HEIGHT: 67 IN | HEART RATE: 81 BPM | WEIGHT: 226 LBS

## 2020-06-24 DIAGNOSIS — I10 ESSENTIAL HYPERTENSION: ICD-10-CM

## 2020-06-24 DIAGNOSIS — R07.89 CHEST TIGHTNESS: ICD-10-CM

## 2020-06-24 DIAGNOSIS — R93.1 ELEVATED CORONARY ARTERY CALCIUM SCORE: ICD-10-CM

## 2020-06-24 DIAGNOSIS — E78.5 DYSLIPIDEMIA: ICD-10-CM

## 2020-06-24 DIAGNOSIS — R06.02 SOB (SHORTNESS OF BREATH): Primary | ICD-10-CM

## 2020-06-24 DIAGNOSIS — Z82.49 FAMILY HISTORY OF EARLY CAD: ICD-10-CM

## 2020-06-24 DIAGNOSIS — J45.40 MODERATE PERSISTENT ASTHMA WITHOUT COMPLICATION: ICD-10-CM

## 2020-06-24 RX ORDER — ASPIRIN 325 MG
325 TABLET ORAL ONCE
Status: CANCELLED | OUTPATIENT
Start: 2020-06-24 | End: 2020-06-24

## 2020-06-24 RX ORDER — SODIUM CHLORIDE 9 MG/ML
INJECTION, SOLUTION INTRAVENOUS CONTINUOUS
Status: CANCELLED | OUTPATIENT
Start: 2020-06-24

## 2020-06-24 RX ORDER — ASPIRIN 81 MG/1
81 TABLET, CHEWABLE ORAL DAILY
Status: CANCELLED | OUTPATIENT
Start: 2020-06-24

## 2020-06-24 SDOH — HEALTH STABILITY: MENTAL HEALTH: HOW OFTEN DO YOU HAVE A DRINK CONTAINING ALCOHOL?: NEVER

## 2020-06-25 ENCOUNTER — HOSPITAL ENCOUNTER (OUTPATIENT)
Age: 65
Discharge: HOME | End: 2020-06-25
Attending: INTERNAL MEDICINE | Admitting: INTERNAL MEDICINE

## 2020-06-25 VITALS
WEIGHT: 230.16 LBS | OXYGEN SATURATION: 99 % | TEMPERATURE: 99.3 F | BODY MASS INDEX: 36.12 KG/M2 | HEART RATE: 82 BPM | RESPIRATION RATE: 14 BRPM | HEIGHT: 67 IN | DIASTOLIC BLOOD PRESSURE: 82 MMHG | SYSTOLIC BLOOD PRESSURE: 143 MMHG

## 2020-06-25 DIAGNOSIS — R93.1 ELEVATED CORONARY ARTERY CALCIUM SCORE: ICD-10-CM

## 2020-06-25 DIAGNOSIS — J45.40 MODERATE PERSISTENT ASTHMA WITHOUT COMPLICATION: ICD-10-CM

## 2020-06-25 DIAGNOSIS — I10 ESSENTIAL HYPERTENSION: ICD-10-CM

## 2020-06-25 DIAGNOSIS — E78.5 DYSLIPIDEMIA: ICD-10-CM

## 2020-06-25 DIAGNOSIS — R07.89 CHEST TIGHTNESS: ICD-10-CM

## 2020-06-25 DIAGNOSIS — R06.02 SOB (SHORTNESS OF BREATH): ICD-10-CM

## 2020-06-25 LAB
ANION GAP SERPL CALC-SCNC: 8 MMOL/L (ref 10–20)
BASOPHILS # BLD: 0 K/MCL (ref 0–0.3)
BASOPHILS NFR BLD: 0 %
BUN SERPL-MCNC: 19 MG/DL (ref 6–20)
BUN/CREAT SERPL: 24 (ref 7–25)
CALCIUM SERPL-MCNC: 9.3 MG/DL (ref 8.4–10.2)
CHLORIDE SERPL-SCNC: 109 MMOL/L (ref 98–107)
CO2 SERPL-SCNC: 29 MMOL/L (ref 21–32)
CREAT SERPL-MCNC: 0.8 MG/DL (ref 0.51–0.95)
DIFFERENTIAL METHOD BLD: ABNORMAL
EOSINOPHIL # BLD: 0.1 K/MCL (ref 0.1–0.5)
EOSINOPHIL NFR BLD: 1 %
ERYTHROCYTE [DISTWIDTH] IN BLOOD: 14.6 % (ref 11–15)
GLUCOSE SERPL-MCNC: 97 MG/DL (ref 65–99)
HCT VFR BLD CALC: 37.5 % (ref 36–46.5)
HGB BLD-MCNC: 12.5 G/DL (ref 12–15.5)
IMM GRANULOCYTES # BLD AUTO: 0.1 K/MCL (ref 0–0.2)
IMM GRANULOCYTES NFR BLD: 1 %
INR PPP: 1
LYMPHOCYTES # BLD: 1.9 K/MCL (ref 1–4)
LYMPHOCYTES NFR BLD: 15 %
MCH RBC QN AUTO: 31.3 PG (ref 26–34)
MCHC RBC AUTO-ENTMCNC: 33.3 G/DL (ref 32–36.5)
MCV RBC AUTO: 94 FL (ref 78–100)
MONOCYTES # BLD: 1.1 K/MCL (ref 0.3–0.9)
MONOCYTES NFR BLD: 9 %
NEUTROPHILS # BLD: 9 K/MCL (ref 1.8–7.7)
NEUTROPHILS NFR BLD: 74 %
NRBC BLD MANUAL-RTO: 0 /100 WBC
PLATELET # BLD: 247 K/MCL (ref 140–450)
POTASSIUM SERPL-SCNC: 3.9 MMOL/L (ref 3.4–5.1)
PROTHROMBIN TIME: 10.7 SEC (ref 9.7–11.8)
RBC # BLD: 3.99 MIL/MCL (ref 4–5.2)
SARS-COV-2 RNA RESP QL NAA+PROBE: NOT DETECTED
SERVICE CMNT-IMP: NORMAL
SODIUM SERPL-SCNC: 142 MMOL/L (ref 135–145)
SPECIMEN SOURCE: NORMAL
WBC # BLD: 12.1 K/MCL (ref 4.2–11)

## 2020-06-25 PROCEDURE — 99152 MOD SED SAME PHYS/QHP 5/>YRS: CPT | Performed by: INTERNAL MEDICINE

## 2020-06-25 PROCEDURE — 85610 PROTHROMBIN TIME: CPT

## 2020-06-25 PROCEDURE — 10006027 HB SUPPLY 278: Performed by: INTERNAL MEDICINE

## 2020-06-25 PROCEDURE — C1760 CLOSURE DEV, VASC: HCPCS | Performed by: INTERNAL MEDICINE

## 2020-06-25 PROCEDURE — 10002800 HB RX 250 W HCPCS: Performed by: INTERNAL MEDICINE

## 2020-06-25 PROCEDURE — 87635 SARS-COV-2 COVID-19 AMP PRB: CPT

## 2020-06-25 PROCEDURE — 93460 R&L HRT ART/VENTRICLE ANGIO: CPT | Performed by: INTERNAL MEDICINE

## 2020-06-25 PROCEDURE — 10002801 HB RX 250 W/O HCPCS: Performed by: INTERNAL MEDICINE

## 2020-06-25 PROCEDURE — 36415 COLL VENOUS BLD VENIPUNCTURE: CPT

## 2020-06-25 PROCEDURE — 10006023 HB SUPPLY 272: Performed by: INTERNAL MEDICINE

## 2020-06-25 PROCEDURE — C1894 INTRO/SHEATH, NON-LASER: HCPCS | Performed by: INTERNAL MEDICINE

## 2020-06-25 PROCEDURE — 10002805 HB CONTRAST AGENT: Performed by: INTERNAL MEDICINE

## 2020-06-25 PROCEDURE — 13000001 HB PHASE II RECOVERY EA 30 MINUTES: Performed by: INTERNAL MEDICINE

## 2020-06-25 PROCEDURE — 99153 MOD SED SAME PHYS/QHP EA: CPT | Performed by: INTERNAL MEDICINE

## 2020-06-25 PROCEDURE — 80048 BASIC METABOLIC PNL TOTAL CA: CPT

## 2020-06-25 PROCEDURE — 85025 COMPLETE CBC W/AUTO DIFF WBC: CPT

## 2020-06-25 DEVICE — MYNXGRIP 6F/7F
Type: IMPLANTABLE DEVICE | Site: GROIN | Status: FUNCTIONAL
Brand: MYNXGRIP

## 2020-06-25 RX ORDER — SODIUM CHLORIDE 9 MG/ML
INJECTION, SOLUTION INTRAVENOUS CONTINUOUS
Status: DISCONTINUED | OUTPATIENT
Start: 2020-06-25 | End: 2020-06-25 | Stop reason: HOSPADM

## 2020-06-25 RX ORDER — 0.9 % SODIUM CHLORIDE 0.9 %
2 VIAL (ML) INJECTION EVERY 12 HOURS SCHEDULED
Status: CANCELLED | OUTPATIENT
Start: 2020-06-25

## 2020-06-25 RX ORDER — MIDAZOLAM HYDROCHLORIDE 1 MG/ML
INJECTION, SOLUTION INTRAMUSCULAR; INTRAVENOUS PRN
Status: DISCONTINUED | OUTPATIENT
Start: 2020-06-25 | End: 2020-06-25 | Stop reason: HOSPADM

## 2020-06-25 RX ORDER — LIDOCAINE HYDROCHLORIDE 20 MG/ML
INJECTION, SOLUTION EPIDURAL; INFILTRATION; INTRACAUDAL; PERINEURAL PRN
Status: DISCONTINUED | OUTPATIENT
Start: 2020-06-25 | End: 2020-06-25 | Stop reason: HOSPADM

## 2020-06-25 RX ORDER — SODIUM CHLORIDE 9 MG/ML
INJECTION, SOLUTION INTRAVENOUS
Status: DISCONTINUED
Start: 2020-06-25 | End: 2020-06-25 | Stop reason: HOSPADM

## 2020-06-25 RX ORDER — ASPIRIN 81 MG/1
81 TABLET, CHEWABLE ORAL DAILY
Status: DISCONTINUED | OUTPATIENT
Start: 2020-06-25 | End: 2020-06-25 | Stop reason: HOSPADM

## 2020-06-25 RX ORDER — FUROSEMIDE 20 MG/1
20 TABLET ORAL 2 TIMES DAILY
Qty: 30 TABLET | Refills: 3 | Status: SHIPPED | OUTPATIENT
Start: 2020-06-25 | End: 2020-06-25

## 2020-06-25 ASSESSMENT — PAIN SCALES - GENERAL
PAINLEVEL_OUTOF10: 0

## 2020-06-26 ENCOUNTER — TELEPHONE (OUTPATIENT)
Dept: PULMONOLOGY | Age: 65
End: 2020-06-26

## 2020-06-30 ENCOUNTER — OFFICE VISIT (OUTPATIENT)
Dept: PULMONOLOGY | Age: 65
End: 2020-06-30

## 2020-06-30 ENCOUNTER — TELEPHONE (OUTPATIENT)
Dept: CARDIOLOGY | Age: 65
End: 2020-06-30

## 2020-06-30 ENCOUNTER — TELEPHONE (OUTPATIENT)
Dept: PULMONOLOGY | Age: 65
End: 2020-06-30

## 2020-06-30 DIAGNOSIS — J45.909 UNCOMPLICATED ASTHMA, UNSPECIFIED ASTHMA SEVERITY, UNSPECIFIED WHETHER PERSISTENT: Primary | ICD-10-CM

## 2020-06-30 PROCEDURE — 99442 TELEPHONE E&M BY PHYSICIAN EST PT NOT ORIG PREV 7 DAYS 11-20 MIN: CPT | Performed by: INTERNAL MEDICINE

## 2020-07-01 ENCOUNTER — OFFICE VISIT (OUTPATIENT)
Dept: CARDIOLOGY | Age: 65
End: 2020-07-01

## 2020-07-01 ENCOUNTER — TELEPHONE (OUTPATIENT)
Dept: CARDIOLOGY | Age: 65
End: 2020-07-01

## 2020-07-01 VITALS
HEART RATE: 91 BPM | HEIGHT: 66 IN | DIASTOLIC BLOOD PRESSURE: 74 MMHG | BODY MASS INDEX: 36.93 KG/M2 | WEIGHT: 229.8 LBS | OXYGEN SATURATION: 97 % | SYSTOLIC BLOOD PRESSURE: 142 MMHG

## 2020-07-01 DIAGNOSIS — I10 ESSENTIAL HYPERTENSION: Primary | ICD-10-CM

## 2020-07-01 DIAGNOSIS — T14.8XXA HEMATOMA: ICD-10-CM

## 2020-07-01 DIAGNOSIS — R93.1 ELEVATED CORONARY ARTERY CALCIUM SCORE: ICD-10-CM

## 2020-07-01 DIAGNOSIS — M79.651 PAIN IN RIGHT THIGH: ICD-10-CM

## 2020-07-01 DIAGNOSIS — R58 ECCHYMOSIS: ICD-10-CM

## 2020-07-01 DIAGNOSIS — R19.09 LUMP IN THE GROIN: ICD-10-CM

## 2020-07-01 PROCEDURE — 99214 OFFICE O/P EST MOD 30 MIN: CPT | Performed by: INTERNAL MEDICINE

## 2020-07-01 PROCEDURE — 93000 ELECTROCARDIOGRAM COMPLETE: CPT | Performed by: INTERNAL MEDICINE

## 2020-07-01 RX ORDER — HYDROCHLOROTHIAZIDE 25 MG/1
50 TABLET ORAL DAILY
Qty: 90 TABLET | Refills: 3 | Status: SHIPPED | OUTPATIENT
Start: 2020-07-01 | End: 2021-01-06

## 2020-07-01 RX ORDER — TIOTROPIUM BROMIDE 18 UG/1
18 CAPSULE ORAL; RESPIRATORY (INHALATION) DAILY
Qty: 30 CAPSULE | Refills: 3 | Status: SHIPPED | OUTPATIENT
Start: 2020-07-01 | End: 2020-08-07

## 2020-07-01 SDOH — HEALTH STABILITY: MENTAL HEALTH: HOW OFTEN DO YOU HAVE A DRINK CONTAINING ALCOHOL?: NEVER

## 2020-07-02 ENCOUNTER — TELEPHONE (OUTPATIENT)
Dept: FAMILY MEDICINE | Age: 65
End: 2020-07-02

## 2020-07-02 ENCOUNTER — IMAGING SERVICES (OUTPATIENT)
Dept: ULTRASOUND IMAGING | Age: 65
End: 2020-07-02
Attending: INTERNAL MEDICINE

## 2020-07-02 ENCOUNTER — E-ADVICE (OUTPATIENT)
Dept: FAMILY MEDICINE | Age: 65
End: 2020-07-02

## 2020-07-02 DIAGNOSIS — R58 ECCHYMOSIS: ICD-10-CM

## 2020-07-02 DIAGNOSIS — M79.651 PAIN IN RIGHT THIGH: ICD-10-CM

## 2020-07-02 DIAGNOSIS — T14.8XXA HEMATOMA: ICD-10-CM

## 2020-07-02 DIAGNOSIS — R19.09 LUMP IN THE GROIN: ICD-10-CM

## 2020-07-02 DIAGNOSIS — Z51.81 MEDICATION MONITORING ENCOUNTER: ICD-10-CM

## 2020-07-02 DIAGNOSIS — E03.8 OTHER SPECIFIED HYPOTHYROIDISM: Primary | ICD-10-CM

## 2020-07-02 PROCEDURE — 76882 US LMTD JT/FCL EVL NVASC XTR: CPT | Performed by: RADIOLOGY

## 2020-07-03 ENCOUNTER — E-ADVICE (OUTPATIENT)
Dept: FAMILY MEDICINE | Age: 65
End: 2020-07-03

## 2020-07-04 PROCEDURE — 93010 ELECTROCARDIOGRAM REPORT: CPT | Performed by: INTERNAL MEDICINE

## 2020-07-05 ENCOUNTER — E-ADVICE (OUTPATIENT)
Dept: ALLERGY | Age: 65
End: 2020-07-05

## 2020-07-06 ENCOUNTER — E-ADVICE (OUTPATIENT)
Dept: FAMILY MEDICINE | Age: 65
End: 2020-07-06

## 2020-07-06 ENCOUNTER — APPOINTMENT (OUTPATIENT)
Dept: FAMILY MEDICINE | Age: 65
End: 2020-07-06

## 2020-07-06 ENCOUNTER — TELEPHONE (OUTPATIENT)
Dept: ALLERGY | Age: 65
End: 2020-07-06

## 2020-07-07 ENCOUNTER — LAB SERVICES (OUTPATIENT)
Dept: LAB | Age: 65
End: 2020-07-07

## 2020-07-07 ENCOUNTER — E-ADVICE (OUTPATIENT)
Dept: FAMILY MEDICINE | Age: 65
End: 2020-07-07

## 2020-07-07 ENCOUNTER — E-ADVICE (OUTPATIENT)
Dept: ALLERGY | Age: 65
End: 2020-07-07

## 2020-07-07 DIAGNOSIS — Z51.81 MEDICATION MONITORING ENCOUNTER: ICD-10-CM

## 2020-07-07 DIAGNOSIS — T14.8XXA HEMATOMA: ICD-10-CM

## 2020-07-07 DIAGNOSIS — Z20.822 ENCOUNTER FOR LABORATORY TESTING FOR COVID-19 VIRUS: ICD-10-CM

## 2020-07-07 DIAGNOSIS — Z01.84 IMMUNITY STATUS TESTING: ICD-10-CM

## 2020-07-07 DIAGNOSIS — I10 ESSENTIAL HYPERTENSION: ICD-10-CM

## 2020-07-07 DIAGNOSIS — E03.8 OTHER SPECIFIED HYPOTHYROIDISM: ICD-10-CM

## 2020-07-07 LAB
ALBUMIN SERPL-MCNC: 4 G/DL (ref 3.6–5.1)
ALP SERPL-CCNC: 84 U/L (ref 45–130)
ALT SERPL W/O P-5'-P-CCNC: 26 U/L (ref 4–38)
AST SERPL-CCNC: 32 U/L (ref 14–43)
BASOPHIL %: 0.1 % (ref 0–1.2)
BASOPHIL ABSOLUTE #: 0 10*3/UL (ref 0–0.1)
BILIRUB CONJ SERPL-MCNC: 0 MG/DL (ref 0–0.3)
BILIRUB SERPL-MCNC: 1.1 MG/DL (ref 0–1.3)
BUN SERPL-MCNC: 17 MG/DL (ref 7–20)
CALCIUM SERPL-MCNC: 10.2 MG/DL (ref 8.6–10.6)
CHLORIDE SERPL-SCNC: 106 MMOL/L (ref 96–107)
CO2 SERPL-SCNC: 28 MMOL/L (ref 22–32)
CREAT SERPL-MCNC: 0.8 MG/DL (ref 0.5–1.4)
DIFFERENTIAL TYPE: ABNORMAL
EOSINOPHIL %: 0.8 % (ref 0–10)
EOSINOPHIL ABSOLUTE #: 0.1 10*3/UL (ref 0–0.5)
GFR SERPL CREATININE-BSD FRML MDRD: >60 ML/MIN/{1.73M2}
GFR SERPL CREATININE-BSD FRML MDRD: >60 ML/MIN/{1.73M2}
GLUCOSE P FAST SERPL-MCNC: 116 MG/DL (ref 60–100)
HEMATOCRIT: 37.9 % (ref 34–45)
HEMOGLOBIN: 12.1 G/DL (ref 11.2–15.7)
LYMPH PERCENT: 18.5 % (ref 20.5–51.1)
LYMPHOCYTE ABSOLUTE #: 1.3 10*3/UL (ref 1.2–3.4)
MEAN CORPUSCULAR HGB CONCENTRATION: 31.9 % (ref 32–36)
MEAN CORPUSCULAR HGB: 32.1 PG (ref 27–34)
MEAN CORPUSCULAR VOLUME: 100.5 FL (ref 79–95)
MEAN PLATELET VOLUME: 11.1 FL (ref 8.6–12.4)
MONOCYTE ABSOLUTE #: 0.7 10*3/UL (ref 0.2–0.9)
MONOCYTE PERCENT: 10.4 % (ref 4.3–12.9)
NEUTROPHIL ABSOLUTE #: 5 10*3/UL (ref 1.4–6.5)
NEUTROPHIL PERCENT: 70.2 % (ref 34–73.5)
PLATELET COUNT: 289 10*3/UL (ref 150–400)
POTASSIUM SERPL-SCNC: 4.2 MMOL/L (ref 3.5–5.3)
PROT SERPL-MCNC: 6.8 G/DL (ref 6.4–8.5)
RED BLOOD CELL COUNT: 3.77 10*6/UL (ref 3.7–5.2)
RED CELL DISTRIBUTION WIDTH: 14.7 % (ref 11.3–14.8)
SODIUM SERPL-SCNC: 141 MMOL/L (ref 136–146)
TSH SERPL DL<=0.05 MIU/L-ACNC: 3.16 M[IU]/L (ref 0.3–4.82)
WHITE BLOOD CELL COUNT: 7.1 10*3/UL (ref 4–10)

## 2020-07-07 PROCEDURE — 85025 COMPLETE CBC W/AUTO DIFF WBC: CPT | Performed by: FAMILY MEDICINE

## 2020-07-07 PROCEDURE — 84443 ASSAY THYROID STIM HORMONE: CPT | Performed by: FAMILY MEDICINE

## 2020-07-07 PROCEDURE — 80048 BASIC METABOLIC PNL TOTAL CA: CPT | Performed by: FAMILY MEDICINE

## 2020-07-07 PROCEDURE — 80076 HEPATIC FUNCTION PANEL: CPT | Performed by: FAMILY MEDICINE

## 2020-07-07 PROCEDURE — 36415 COLL VENOUS BLD VENIPUNCTURE: CPT | Performed by: FAMILY MEDICINE

## 2020-07-08 ENCOUNTER — TELEPHONE (OUTPATIENT)
Dept: PULMONOLOGY | Age: 65
End: 2020-07-08

## 2020-07-08 LAB
SARS-COV-2 IGG SERPL QL IA: NEGATIVE
SARS-COV-2 N IGG SERPL QL IA: 0.25

## 2020-07-08 RX ORDER — LOSARTAN POTASSIUM 100 MG/1
TABLET ORAL
Qty: 90 TABLET | Refills: 0 | Status: SHIPPED | OUTPATIENT
Start: 2020-07-08 | End: 2020-10-04 | Stop reason: SDUPTHER

## 2020-07-09 ENCOUNTER — TELEPHONE (OUTPATIENT)
Dept: ALLERGY | Age: 65
End: 2020-07-09

## 2020-07-09 ENCOUNTER — APPOINTMENT (OUTPATIENT)
Dept: FAMILY MEDICINE | Age: 65
End: 2020-07-09

## 2020-07-09 ENCOUNTER — OFFICE VISIT (OUTPATIENT)
Dept: ALLERGY | Age: 65
End: 2020-07-09

## 2020-07-09 ENCOUNTER — E-ADVICE (OUTPATIENT)
Dept: ALLERGY | Age: 65
End: 2020-07-09

## 2020-07-09 ENCOUNTER — E-ADVICE (OUTPATIENT)
Dept: FAMILY MEDICINE | Age: 65
End: 2020-07-09

## 2020-07-09 DIAGNOSIS — J30.1 ALLERGIC RHINITIS DUE TO POLLEN, UNSPECIFIED SEASONALITY: ICD-10-CM

## 2020-07-09 DIAGNOSIS — J45.41 MODERATE PERSISTENT ASTHMA WITH ACUTE EXACERBATION: ICD-10-CM

## 2020-07-09 DIAGNOSIS — R06.02 SHORTNESS OF BREATH: Primary | ICD-10-CM

## 2020-07-09 PROCEDURE — 99443 TELEPHONE E&M BY PHYSICIAN EST PT NOT ORIG PREV 7 DAYS 21-30 MIN: CPT | Performed by: ALLERGY & IMMUNOLOGY

## 2020-07-09 RX ORDER — BUDESONIDE AND FORMOTEROL FUMARATE DIHYDRATE 160; 4.5 UG/1; UG/1
2 AEROSOL RESPIRATORY (INHALATION) 2 TIMES DAILY
Qty: 10.2 G | Refills: 12 | Status: SHIPPED | OUTPATIENT
Start: 2020-07-09 | End: 2020-09-22 | Stop reason: CLARIF

## 2020-07-09 RX ORDER — AZELASTINE 1 MG/ML
1 SPRAY, METERED NASAL 2 TIMES DAILY
Qty: 30 ML | Refills: 12 | Status: SHIPPED | OUTPATIENT
Start: 2020-07-09 | End: 2020-09-22 | Stop reason: CLARIF

## 2020-07-09 ASSESSMENT — ENCOUNTER SYMPTOMS
SHORTNESS OF BREATH: 1
RHINORRHEA: 0
UNEXPECTED WEIGHT CHANGE: 0
FEVER: 0
SINUS PAIN: 0
SINUS PRESSURE: 0
COUGH: 0
CHEST TIGHTNESS: 1

## 2020-07-10 ENCOUNTER — LAB SERVICES (OUTPATIENT)
Dept: LAB | Age: 65
End: 2020-07-10

## 2020-07-10 DIAGNOSIS — J30.1 ALLERGIC RHINITIS DUE TO POLLEN, UNSPECIFIED SEASONALITY: ICD-10-CM

## 2020-07-10 DIAGNOSIS — J45.41 MODERATE PERSISTENT ASTHMA WITH ACUTE EXACERBATION: ICD-10-CM

## 2020-07-10 DIAGNOSIS — R06.02 SHORTNESS OF BREATH: ICD-10-CM

## 2020-07-10 PROCEDURE — 36415 COLL VENOUS BLD VENIPUNCTURE: CPT | Performed by: ALLERGY & IMMUNOLOGY

## 2020-07-10 PROCEDURE — 82785 ASSAY OF IGE: CPT | Performed by: ALLERGY & IMMUNOLOGY

## 2020-07-10 PROCEDURE — 86003 ALLG SPEC IGE CRUDE XTRC EA: CPT | Performed by: ALLERGY & IMMUNOLOGY

## 2020-07-14 ENCOUNTER — E-ADVICE (OUTPATIENT)
Dept: FAMILY MEDICINE | Age: 65
End: 2020-07-14

## 2020-07-14 LAB
A ALTERNATA IGE QN: 0.21 KU/L (ref 0–0.1)
A FUMIGATUS IGE QN: 0.26 KU/L (ref 0–0.1)
A NIGER IGE QN: 0.85 KU/L (ref 0–0.1)
A PULLULANS IGE QN: 0.1 KU/L (ref 0–0.1)
AMER BEECH IGE QN: <0.1 KU/L (ref 0–0.1)
BERMUDA GRASS IGE QN: <0.1 KU/L (ref 0–0.1)
BOXELDER IGE QN: <0.1 KU/L (ref 0–0.1)
C HERBARUM IGE QN: <0.1 KU/L (ref 0–0.1)
CALIF WALNUT POLN IGE QN: <0.1 KU/L (ref 0–0.1)
CAT DANDER IGE QN: 0.2 KU/L (ref 0–0.1)
COCKSFOOT IGE QN: <0.1 KU/L (ref 0–0.1)
COMMON RAGWEED IGE QN: <0.1 KU/L (ref 0–0.1)
D FARINAE IGE QN: 0.31 KU/L (ref 0–0.1)
D PTERONYSS IGE QN: 0.33 KU/L (ref 0–0.1)
DOG DANDER IGE QN: 1.72 KU/L (ref 0–0.1)
E PURPURASCENS IGE QN: <0.1 KU/L (ref 0–0.1)
ENGL PLANTAIN IGE QN: <0.1 KU/L (ref 0–0.1)
F MONILIFORME IGE QN: 0.26 KU/L (ref 0–0.1)
GIANT RAGWEED IGE QN: <0.1 KU/L (ref 0–0.1)
JOHNSON GRASS IGE QN: <0.1 KU/L (ref 0–0.1)
KENT BLUE GRASS IGE QN: <0.1 KU/L (ref 0–0.1)
LONDON PLANE IGE QN: <0.1 KU/L (ref 0–0.1)
M RACEMOSUS IGE QN: <0.1 KU/L (ref 0–0.1)
MUGWORT IGE QN: <0.1 KU/L (ref 0–0.1)
P BETAE IGE QN: <0.1 KU/L (ref 0–0.1)
P NOTATUM IGE QN: 0.21 KU/L (ref 0–0.1)
PECAN/HICK TREE IGE QN: <0.1 KU/L (ref 0–0.1)
PER RYE GRASS IGE QN: <0.1 KU/L (ref 0–0.1)
RED CEDAR IGE QN: <0.1 KU/L (ref 0–0.1)
RED TOP GRASS IGE QN: <0.1 KU/L (ref 0–0.1)
ROACH IGE QN: <0.1 KU/L (ref 0–0.1)
S ROSTRATA IGE QN: <0.1 KU/L (ref 0–0.1)
SALTWORT IGE QN: <0.1 KU/L (ref 0–0.1)
SILVER BIRCH IGE QN: <0.1 KU/L (ref 0–0.1)
TIMOTHY IGE QN: <0.1 KU/L (ref 0–0.1)
TOTAL IGE SMQN RAST: 84 KU/L (ref 0–100)
WHITE ASH IGE QN: <0.1 KU/L (ref 0–0.1)
WHITE ELM IGE QN: <0.1 KU/L (ref 0–0.1)
WHITE OAK IGE QN: <0.1 KU/L (ref 0–0.1)
WILLOW IGE QN: <0.1 KU/L (ref 0–0.1)

## 2020-07-16 ENCOUNTER — TELEPHONE (OUTPATIENT)
Dept: ALLERGY | Age: 65
End: 2020-07-16

## 2020-07-16 ENCOUNTER — E-ADVICE (OUTPATIENT)
Dept: FAMILY MEDICINE | Age: 65
End: 2020-07-16

## 2020-07-16 ENCOUNTER — OFFICE VISIT (OUTPATIENT)
Dept: FAMILY MEDICINE | Age: 65
End: 2020-07-16

## 2020-07-16 DIAGNOSIS — Z00.00 PREVENTATIVE HEALTH CARE: Primary | ICD-10-CM

## 2020-07-16 DIAGNOSIS — E78.5 DYSLIPIDEMIA: ICD-10-CM

## 2020-07-16 DIAGNOSIS — F41.9 ANXIETY: ICD-10-CM

## 2020-07-16 DIAGNOSIS — Z12.39 BREAST CANCER SCREENING: ICD-10-CM

## 2020-07-16 DIAGNOSIS — Z78.0 POST-MENOPAUSAL: ICD-10-CM

## 2020-07-16 DIAGNOSIS — R73.01 IMPAIRED FASTING BLOOD SUGAR: ICD-10-CM

## 2020-07-16 PROCEDURE — 99443 TELEPHONE E&M BY PHYSICIAN EST PT NOT ORIG PREV 7 DAYS 21-30 MIN: CPT | Performed by: FAMILY MEDICINE

## 2020-07-16 PROCEDURE — G0439 PPPS, SUBSEQ VISIT: HCPCS | Performed by: FAMILY MEDICINE

## 2020-07-16 RX ORDER — BUSPIRONE HYDROCHLORIDE 5 MG/1
5 TABLET ORAL DAILY
Qty: 30 TABLET | Refills: 1 | Status: SHIPPED | OUTPATIENT
Start: 2020-07-16 | End: 2020-08-07 | Stop reason: DRUGHIGH

## 2020-07-16 SDOH — ECONOMIC STABILITY: HOUSING INSECURITY: ARE YOU WORRIED ABOUT LOSING YOUR HOUSING?: NO

## 2020-07-16 SDOH — HEALTH STABILITY: MENTAL HEALTH: HOW OFTEN DO YOU HAVE A DRINK CONTAINING ALCOHOL?: NEVER

## 2020-07-16 SDOH — ECONOMIC STABILITY: HOUSING INSECURITY: WHAT IS YOUR LIVING SITUATION TODAY?: I HAVE HOUSING

## 2020-07-16 ASSESSMENT — PATIENT HEALTH QUESTIONNAIRE - PHQ9
CLINICAL INTERPRETATION OF PHQ9 SCORE: NO FURTHER SCREENING NEEDED
1. LITTLE INTEREST OR PLEASURE IN DOING THINGS: SEVERAL DAYS
SUM OF ALL RESPONSES TO PHQ9 QUESTIONS 1 AND 2: 2
SUM OF ALL RESPONSES TO PHQ9 QUESTIONS 1 AND 2: 2
CLINICAL INTERPRETATION OF PHQ2 SCORE: NO FURTHER SCREENING NEEDED
2. FEELING DOWN, DEPRESSED OR HOPELESS: SEVERAL DAYS

## 2020-07-16 ASSESSMENT — ACTIVITIES OF DAILY LIVING (ADL)
ADL_BEFORE_ADMISSION: INDEPENDENT
ADL_SCORE: 12
NEEDS_ASSIST: NO
RECENT_DECLINE_ADL: NO
SENSORY_SUPPORT_DEVICES: EYEGLASSES
ADL_SHORT_OF_BREATH: YES

## 2020-07-16 ASSESSMENT — COGNITIVE AND FUNCTIONAL STATUS - GENERAL
DO YOU HAVE SERIOUS DIFFICULTY WALKING OR CLIMBING STAIRS: NO
ARE YOU DEAF OR DO YOU HAVE SERIOUS DIFFICULTY  HEARING: NO
BECAUSE OF A PHYSICAL, MENTAL, OR EMOTIONAL CONDITION, DO YOU HAVE DIFFICULTY DOING ERRANDS ALONE: NO
DO YOU HAVE DIFFICULTY DRESSING OR BATHING: NO
ARE YOU BLIND OR DO YOU HAVE SERIOUS DIFFICULTY SEEING, EVEN WHEN WEARING GLASSES: NO
BECAUSE OF A PHYSICAL, MENTAL, OR EMOTIONAL CONDITION, DO YOU HAVE SERIOUS DIFFICULTY CONCENTRATING, REMEMBERING OR MAKING DECISIONS: NO

## 2020-07-16 ASSESSMENT — MINI COG
PATIENT WAS GIVEN REPEAT BACK WORDS FROM VERSION: IMMEDIATE REPEAT BACK - APPLE WATCH PENNY
ABLE TO REPEAT THE 3 WORDS GIVEN PREVIOUSLY?: APPLE;WATCH;PENNY
TOTAL SCORE: 3  NEGATIVE FOR COGNITIVE IMPAIRMENT (NO NEED TO SCORE CDT)

## 2020-07-17 RX ORDER — ALBUTEROL SULFATE 90 UG/1
2 AEROSOL, METERED RESPIRATORY (INHALATION) EVERY 4 HOURS PRN
Qty: 1 INHALER | Refills: 2 | Status: SHIPPED | OUTPATIENT
Start: 2020-07-17 | End: 2021-12-03 | Stop reason: SDUPTHER

## 2020-07-24 ENCOUNTER — APPOINTMENT (OUTPATIENT)
Dept: PULMONOLOGY | Age: 65
End: 2020-07-24

## 2020-07-24 ENCOUNTER — OFFICE VISIT (OUTPATIENT)
Dept: ALLERGY | Age: 65
End: 2020-07-24

## 2020-07-24 ENCOUNTER — TELEPHONE (OUTPATIENT)
Dept: GASTROENTEROLOGY | Age: 65
End: 2020-07-24

## 2020-07-24 ENCOUNTER — APPOINTMENT (OUTPATIENT)
Dept: ALLERGY | Age: 65
End: 2020-07-24

## 2020-07-24 VITALS
HEIGHT: 66 IN | SYSTOLIC BLOOD PRESSURE: 148 MMHG | TEMPERATURE: 98.2 F | HEART RATE: 75 BPM | WEIGHT: 226 LBS | BODY MASS INDEX: 36.32 KG/M2 | DIASTOLIC BLOOD PRESSURE: 82 MMHG

## 2020-07-24 DIAGNOSIS — K21.9 GERD WITHOUT ESOPHAGITIS: Primary | ICD-10-CM

## 2020-07-24 DIAGNOSIS — K21.9 GASTROESOPHAGEAL REFLUX DISEASE WITHOUT ESOPHAGITIS: Primary | ICD-10-CM

## 2020-07-24 DIAGNOSIS — B37.0 ORAL THRUSH: ICD-10-CM

## 2020-07-24 DIAGNOSIS — J31.0 NON-ALLERGIC RHINITIS: ICD-10-CM

## 2020-07-24 DIAGNOSIS — J45.40 MODERATE PERSISTENT ASTHMA WITHOUT COMPLICATION: ICD-10-CM

## 2020-07-24 PROCEDURE — 95004 PERQ TESTS W/ALRGNC XTRCS: CPT | Performed by: PHYSICIAN ASSISTANT

## 2020-07-24 PROCEDURE — 99214 OFFICE O/P EST MOD 30 MIN: CPT | Performed by: PHYSICIAN ASSISTANT

## 2020-07-24 RX ORDER — CLOTRIMAZOLE 10 MG/1
10 LOZENGE ORAL; TOPICAL
Qty: 35 EACH | Refills: 0 | Status: SHIPPED | OUTPATIENT
Start: 2020-07-24 | End: 2020-07-31

## 2020-07-24 RX ORDER — MONTELUKAST SODIUM 10 MG/1
10 TABLET ORAL NIGHTLY
Qty: 30 TABLET | Refills: 5 | Status: SHIPPED | OUTPATIENT
Start: 2020-07-24 | End: 2021-01-22 | Stop reason: SDUPTHER

## 2020-07-28 ENCOUNTER — E-ADVICE (OUTPATIENT)
Dept: ALLERGY | Age: 65
End: 2020-07-28

## 2020-07-29 ENCOUNTER — APPOINTMENT (OUTPATIENT)
Dept: CARDIOLOGY | Age: 65
End: 2020-07-29
Attending: FAMILY MEDICINE

## 2020-07-31 ENCOUNTER — OFFICE VISIT (OUTPATIENT)
Dept: FAMILY MEDICINE | Facility: CLINIC | Age: 65
End: 2020-07-31
Payer: COMMERCIAL

## 2020-07-31 VITALS
WEIGHT: 293 LBS | SYSTOLIC BLOOD PRESSURE: 114 MMHG | DIASTOLIC BLOOD PRESSURE: 72 MMHG | TEMPERATURE: 97.6 F | RESPIRATION RATE: 18 BRPM | OXYGEN SATURATION: 97 % | HEART RATE: 95 BPM | BODY MASS INDEX: 41.23 KG/M2

## 2020-07-31 DIAGNOSIS — F98.8 ATTENTION DEFICIT DISORDER (ADD) WITHOUT HYPERACTIVITY: ICD-10-CM

## 2020-07-31 DIAGNOSIS — R94.6 ABNORMAL RESULTS OF THYROID FUNCTION STUDIES: ICD-10-CM

## 2020-07-31 DIAGNOSIS — G47.33 OSA (OBSTRUCTIVE SLEEP APNEA): ICD-10-CM

## 2020-07-31 DIAGNOSIS — K21.9 GASTROESOPHAGEAL REFLUX DISEASE, ESOPHAGITIS PRESENCE NOT SPECIFIED: ICD-10-CM

## 2020-07-31 DIAGNOSIS — M17.11 PRIMARY OSTEOARTHRITIS OF RIGHT KNEE: ICD-10-CM

## 2020-07-31 DIAGNOSIS — E66.01 MORBID OBESITY (H): Primary | ICD-10-CM

## 2020-07-31 DIAGNOSIS — E78.5 HYPERLIPIDEMIA LDL GOAL <130: ICD-10-CM

## 2020-07-31 PROCEDURE — 99214 OFFICE O/P EST MOD 30 MIN: CPT | Performed by: FAMILY MEDICINE

## 2020-07-31 RX ORDER — VERAPAMIL HYDROCHLORIDE 300 MG/1
300 CAPSULE, EXTENDED RELEASE ORAL NIGHTLY
Qty: 90 CAPSULE | Refills: 3 | Status: SHIPPED | OUTPATIENT
Start: 2020-07-31 | End: 2021-01-29 | Stop reason: DRUGHIGH

## 2020-07-31 ASSESSMENT — PAIN SCALES - GENERAL: PAINLEVEL: NO PAIN (0)

## 2020-07-31 NOTE — PROGRESS NOTES
Subjective     Ashli Rogers is a 65 year old female who presents to clinic today for the following health issues:    HPI       New Patient/Transfer of Care, her previous provider retired.   She is concerned about her weight and her risk of serious illness from COVID-19.   She is concerned she may be diabetic or have other serious health issues based on her weight and her age.     She has a history of ZELDA, likely for many years but only on CPAP for the past year. Works well.   She is a retired speech pathologist, worked for the school district. She lives at home with her  and 2 horses. Has 2 grown children, a son in [place] is an  and a dtr in [place] is a .     She has had diverticulitis in past, knows what NOT to eat to prevent it.   She has arthritis, uses CBD oil which helps on her right knee.     Patient Active Problem List   Diagnosis     S/P total hip arthroplasty     Varicose veins of lower extremities with complications     Allergic rhinitis     DJD (degenerative joint disease) of knee     Advanced directives, counseling/discussion     ADD (attention deficit disorder)     Gastroesophageal reflux disease, esophagitis presence not specified     Morbid obesity (H)     Hiatal hernia     Diverticulitis of colon     Bunion, left     Pain due to varicose veins of both lower extremities     ZELDA (obstructive sleep apnea)     Hyperlipidemia LDL goal <130     Past Surgical History:   Procedure Laterality Date     C TOTAL HIP ARTHROPLASTY Right 04/26/2004    Hip Replacement, Total     C TOTAL KNEE ARTHROPLASTY Left 2013     COLONOSCOPY N/A 5/11/2016    Procedure: COLONOSCOPY;  Surgeon: Ervin Johnston MD;  Location:  GI     ESOPHAGOSCOPY, GASTROSCOPY, DUODENOSCOPY (EGD), COMBINED N/A 5/11/2016    Procedure: COMBINED ESOPHAGOSCOPY, GASTROSCOPY, DUODENOSCOPY (EGD), BIOPSY SINGLE OR MULTIPLE;  Surgeon: Ervin Johnston MD;  Location:  GI     FINGER SURGERY Right 04/2019     ganglion cyst removed     HC COLONOSCOPY W/WO BRUSH/WASH  2006       Social History     Tobacco Use     Smoking status: Never Smoker     Smokeless tobacco: Never Used   Substance Use Topics     Alcohol use: Yes     Comment: 2 drinks per month     Family History   Problem Relation Age of Onset     Cancer Father          of lung cancer     Heart Disease Daughter         2 holes in her heart         BP Readings from Last 3 Encounters:   20 114/72   20 109/62   20 118/78    Wt Readings from Last 3 Encounters:   20 137.9 kg (304 lb)   20 138.8 kg (306 lb)   19 139.1 kg (306 lb 9.6 oz)         she has GERD, takes prilosec a couple times per week.   She has ADD, sees Dr. Lechuga for Vyvanse, but not really using it much since USP since she doesn't need to be as focused.   She is active at home, but hasn't been riding her horses much. She does walk a little, when outside she uses ski poles on uneven ground to help with balance.        Reviewed and updated as needed this visit by Provider  Tobacco  Allergies  Meds  Problems  Med Hx  Surg Hx  Fam Hx  Soc Hx          Review of Systems   10 pt ROS is otherwise negative except as noted in HPI.        Objective    /72 (Cuff Size: Adult Large)   Pulse 95   Temp 97.6  F (36.4  C) (Temporal)   Resp 18   Wt 137.9 kg (304 lb)   LMP 2005   SpO2 97%   BMI 41.23 kg/m    Body mass index is 41.23 kg/m .  Physical Exam   She is casually dressed and well groomed. She makes good eye contact, has normal speech and affect.    Extremities warm and dry without cyanosis, clubbing or edema. Well healed left knee incision.   She is not otherwise examined today.      Orders Placed This Encounter   Procedures     Basic metabolic panel  (Ca, Cl, CO2, Creat, Gluc, K, Na, BUN)     Lipid panel reflex to direct LDL Fasting     TSH with free T4 reflex     JUST IN CASE            Assessment & Plan       ICD-10-CM    1. Morbid  obesity (H)  E66.01 Basic metabolic panel  (Ca, Cl, CO2, Creat, Gluc, K, Na, BUN)     Lipid panel reflex to direct LDL Fasting     JUST IN CASE   2. Gastroesophageal reflux disease, esophagitis presence not specified  K21.9 Basic metabolic panel  (Ca, Cl, CO2, Creat, Gluc, K, Na, BUN)   3. Attention deficit disorder (ADD) without hyperactivity  F98.8    4. Primary osteoarthritis of right knee  M17.11 Basic metabolic panel  (Ca, Cl, CO2, Creat, Gluc, K, Na, BUN)   5. ZELDA (obstructive sleep apnea)  G47.33    6. Hyperlipidemia LDL goal <130  E78.5 Lipid panel reflex to direct LDL Fasting     JUST IN CASE   7. Abnormal results of thyroid function studies   R94.6 TSH with free T4 reflex          We discussed her health risks and COVID. She has no known heart or lung disease but her weight is a risk. She does have ZELDA, which is also likely related to her weight.   I encouraged her to work hard on losing weight through low impact, weightbearing exercise as tolerated.  Also recommend a prescribed healthy diet.  I recommend she get screened for diabetes and hypothyroidism.  In the past she had an elevated TSH and this has not been checked recently.  She does have a physical scheduled in September so I put in fasting lab orders.  She has a history of hyperlipidemia as well so we will recheck that.  Because of her frequent use of ibuprofen and Prilosec I am checking a basic metabolic panel.    I agree with her not using her Vyvanse often, and she continues to follow with Dr. Lechuga for that.  We discussed diverticulosis and diverticulitis.  We discussed symptoms to watch for and when to follow-up with problems.    BMI:   Estimated body mass index is 41.23 kg/m  as calculated from the following:    Height as of 1/30/20: 1.829 m (6').    Weight as of this encounter: 137.9 kg (304 lb).   Weight management plan: Discussed healthy diet and exercise guidelines    Total time spent face to face with patient was 30 minutes, over 50%  in counselling.     FUTURE LABS:       - Schedule a fasting blood draw prior to her physical.   FUTURE APPOINTMENTS:       - PE scheduled in September.   Work on weight loss  Regular exercise    Zelda Garcia MD  [facility]

## 2020-08-01 PROBLEM — G47.33 OSA (OBSTRUCTIVE SLEEP APNEA): Status: ACTIVE | Noted: 2020-08-01

## 2020-08-01 PROBLEM — E78.5 HYPERLIPIDEMIA LDL GOAL <130: Status: ACTIVE | Noted: 2020-08-01

## 2020-08-03 ENCOUNTER — OFFICE VISIT (OUTPATIENT)
Dept: CARDIOLOGY | Age: 65
End: 2020-08-03

## 2020-08-03 VITALS
OXYGEN SATURATION: 98 % | BODY MASS INDEX: 36.22 KG/M2 | SYSTOLIC BLOOD PRESSURE: 130 MMHG | HEART RATE: 81 BPM | WEIGHT: 225.4 LBS | DIASTOLIC BLOOD PRESSURE: 70 MMHG | HEIGHT: 66 IN

## 2020-08-03 DIAGNOSIS — Z82.49 FAMILY HISTORY OF EARLY CAD: ICD-10-CM

## 2020-08-03 DIAGNOSIS — E66.9 OBESITY (BMI 30-39.9): ICD-10-CM

## 2020-08-03 DIAGNOSIS — I10 ESSENTIAL HYPERTENSION: ICD-10-CM

## 2020-08-03 DIAGNOSIS — R93.1 ELEVATED CORONARY ARTERY CALCIUM SCORE: ICD-10-CM

## 2020-08-03 DIAGNOSIS — E78.5 DYSLIPIDEMIA: ICD-10-CM

## 2020-08-03 DIAGNOSIS — R06.02 SOB (SHORTNESS OF BREATH): Primary | ICD-10-CM

## 2020-08-03 PROBLEM — I25.10 CORONARY ARTERY DISEASE INVOLVING NATIVE CORONARY ARTERY OF NATIVE HEART WITHOUT ANGINA PECTORIS: Status: ACTIVE | Noted: 2020-08-03

## 2020-08-03 PROCEDURE — 99214 OFFICE O/P EST MOD 30 MIN: CPT | Performed by: INTERNAL MEDICINE

## 2020-08-03 SDOH — HEALTH STABILITY: MENTAL HEALTH: HOW OFTEN DO YOU HAVE A DRINK CONTAINING ALCOHOL?: NEVER

## 2020-08-04 ENCOUNTER — E-ADVICE (OUTPATIENT)
Dept: GASTROENTEROLOGY | Age: 65
End: 2020-08-04

## 2020-08-05 ENCOUNTER — IMAGING SERVICES (OUTPATIENT)
Dept: ULTRASOUND IMAGING | Age: 65
End: 2020-08-05
Attending: FAMILY MEDICINE

## 2020-08-05 DIAGNOSIS — K21.9 GASTRO-ESOPHAGEAL REFLUX DISEASE WITHOUT ESOPHAGITIS: ICD-10-CM

## 2020-08-05 DIAGNOSIS — K29.60 OTHER GASTRITIS WITHOUT BLEEDING: ICD-10-CM

## 2020-08-05 DIAGNOSIS — K20.80 OTHER SPECIFIED OESOPHAGITIS: ICD-10-CM

## 2020-08-05 DIAGNOSIS — N28.1 KIDNEY CYSTS: Primary | ICD-10-CM

## 2020-08-05 DIAGNOSIS — N28.9 KIDNEY LESION: ICD-10-CM

## 2020-08-05 DIAGNOSIS — K22.89 OTHER SPECIFIED DISEASES OF ESOPHAGUS: ICD-10-CM

## 2020-08-05 DIAGNOSIS — K31.7 POLYP OF STOMACH AND DUODENUM: ICD-10-CM

## 2020-08-05 PROCEDURE — 76770 US EXAM ABDO BACK WALL COMP: CPT | Performed by: RADIOLOGY

## 2020-08-07 ENCOUNTER — OFFICE VISIT (OUTPATIENT)
Dept: FAMILY MEDICINE | Age: 65
End: 2020-08-07

## 2020-08-07 VITALS
SYSTOLIC BLOOD PRESSURE: 128 MMHG | BODY MASS INDEX: 36.32 KG/M2 | HEART RATE: 76 BPM | DIASTOLIC BLOOD PRESSURE: 74 MMHG | OXYGEN SATURATION: 98 % | TEMPERATURE: 97.8 F | RESPIRATION RATE: 18 BRPM | WEIGHT: 225 LBS

## 2020-08-07 DIAGNOSIS — F41.1 GAD (GENERALIZED ANXIETY DISORDER): Primary | ICD-10-CM

## 2020-08-07 DIAGNOSIS — I10 ESSENTIAL HYPERTENSION: ICD-10-CM

## 2020-08-07 DIAGNOSIS — E78.5 DYSLIPIDEMIA: ICD-10-CM

## 2020-08-07 PROCEDURE — 99214 OFFICE O/P EST MOD 30 MIN: CPT | Performed by: FAMILY MEDICINE

## 2020-08-07 RX ORDER — ATORVASTATIN CALCIUM 40 MG/1
40 TABLET, FILM COATED ORAL DAILY
Qty: 90 TABLET | Refills: 3 | Status: SHIPPED | OUTPATIENT
Start: 2020-08-07 | End: 2021-08-03

## 2020-08-07 RX ORDER — BUSPIRONE HYDROCHLORIDE 7.5 MG/1
7.5 TABLET ORAL DAILY
Qty: 90 TABLET | Refills: 2 | Status: SHIPPED | OUTPATIENT
Start: 2020-08-07 | End: 2021-08-16

## 2020-08-07 SDOH — HEALTH STABILITY: MENTAL HEALTH: HOW OFTEN DO YOU HAVE A DRINK CONTAINING ALCOHOL?: NEVER

## 2020-08-07 ASSESSMENT — PATIENT HEALTH QUESTIONNAIRE - PHQ9
SUM OF ALL RESPONSES TO PHQ9 QUESTIONS 1 AND 2: 1
CLINICAL INTERPRETATION OF PHQ2 SCORE: NO FURTHER SCREENING NEEDED
CLINICAL INTERPRETATION OF PHQ9 SCORE: NO FURTHER SCREENING NEEDED
SUM OF ALL RESPONSES TO PHQ9 QUESTIONS 1 AND 2: 1
1. LITTLE INTEREST OR PLEASURE IN DOING THINGS: NOT AT ALL
2. FEELING DOWN, DEPRESSED OR HOPELESS: SEVERAL DAYS

## 2020-08-12 ENCOUNTER — LAB SERVICES (OUTPATIENT)
Dept: LAB | Age: 65
End: 2020-08-12

## 2020-08-12 DIAGNOSIS — K21.9 GERD WITHOUT ESOPHAGITIS: ICD-10-CM

## 2020-08-12 LAB
SARS-COV-2 RNA RESP QL NAA+PROBE: NOT DETECTED
SERVICE CMNT-IMP: NORMAL
SPECIMEN SOURCE: NORMAL

## 2020-08-14 ENCOUNTER — OFFICE VISIT (OUTPATIENT)
Dept: GASTROENTEROLOGY | Age: 65
End: 2020-08-14
Attending: INTERNAL MEDICINE

## 2020-08-14 DIAGNOSIS — K21.9 GERD WITHOUT ESOPHAGITIS: Primary | ICD-10-CM

## 2020-08-14 PROCEDURE — 88342 IMHCHEM/IMCYTCHM 1ST ANTB: CPT | Performed by: PATHOLOGY

## 2020-08-14 PROCEDURE — 43239 EGD BIOPSY SINGLE/MULTIPLE: CPT | Performed by: INTERNAL MEDICINE

## 2020-08-14 PROCEDURE — 88305 TISSUE EXAM BY PATHOLOGIST: CPT | Performed by: PATHOLOGY

## 2020-08-14 PROCEDURE — 43251 EGD REMOVE LESION SNARE: CPT | Performed by: INTERNAL MEDICINE

## 2020-08-17 ENCOUNTER — LAB SERVICES (OUTPATIENT)
Dept: LAB | Age: 65
End: 2020-08-17

## 2020-08-17 DIAGNOSIS — E78.5 DYSLIPIDEMIA: ICD-10-CM

## 2020-08-17 DIAGNOSIS — R73.01 IMPAIRED FASTING BLOOD SUGAR: ICD-10-CM

## 2020-08-17 LAB
CHOLEST SERPL-MCNC: 152 MG/DL (ref 140–200)
GLUCOSE P FAST SERPL-MCNC: 101 MG/DL (ref 60–100)
HDLC SERPL-MCNC: 52 MG/DL
LDLC SERPL CALC-MCNC: 83 MG/DL (ref 30–100)
TRIGL SERPL-MCNC: 83 MG/DL (ref 0–200)

## 2020-08-17 PROCEDURE — 36415 COLL VENOUS BLD VENIPUNCTURE: CPT | Performed by: FAMILY MEDICINE

## 2020-08-17 PROCEDURE — 82947 ASSAY GLUCOSE BLOOD QUANT: CPT | Performed by: FAMILY MEDICINE

## 2020-08-17 PROCEDURE — 80061 LIPID PANEL: CPT | Performed by: FAMILY MEDICINE

## 2020-08-19 ENCOUNTER — E-ADVICE (OUTPATIENT)
Dept: FAMILY MEDICINE | Age: 65
End: 2020-08-19

## 2020-08-19 LAB — AP REPORT: NORMAL

## 2020-08-20 RX ORDER — OMEPRAZOLE 40 MG/1
40 CAPSULE, DELAYED RELEASE ORAL 2 TIMES DAILY
Qty: 60 CAPSULE | Refills: 6 | Status: SHIPPED | OUTPATIENT
Start: 2020-08-20 | End: 2020-09-22 | Stop reason: SDUPTHER

## 2020-08-21 ENCOUNTER — OFFICE VISIT (OUTPATIENT)
Dept: ALLERGY | Age: 65
End: 2020-08-21

## 2020-08-21 VITALS
WEIGHT: 225 LBS | TEMPERATURE: 98.2 F | DIASTOLIC BLOOD PRESSURE: 82 MMHG | BODY MASS INDEX: 36.16 KG/M2 | HEART RATE: 78 BPM | HEIGHT: 66 IN | SYSTOLIC BLOOD PRESSURE: 128 MMHG

## 2020-08-21 DIAGNOSIS — K21.00 GASTROESOPHAGEAL REFLUX DISEASE WITH ESOPHAGITIS: ICD-10-CM

## 2020-08-21 DIAGNOSIS — J30.81 ALLERGIC RHINITIS DUE TO ANIMAL HAIR AND DANDER: ICD-10-CM

## 2020-08-21 DIAGNOSIS — J45.30 MILD PERSISTENT ASTHMA WITHOUT COMPLICATION: Primary | ICD-10-CM

## 2020-08-21 PROCEDURE — 99214 OFFICE O/P EST MOD 30 MIN: CPT | Performed by: ALLERGY & IMMUNOLOGY

## 2020-08-26 ENCOUNTER — TRANSFERRED RECORDS (OUTPATIENT)
Dept: HEALTH INFORMATION MANAGEMENT | Facility: CLINIC | Age: 65
End: 2020-08-26

## 2020-08-27 ENCOUNTER — E-ADVICE (OUTPATIENT)
Dept: ALLERGY | Age: 65
End: 2020-08-27

## 2020-08-27 RX ORDER — FLUTICASONE PROPIONATE AND SALMETEROL 250; 50 UG/1; UG/1
1 POWDER RESPIRATORY (INHALATION)
Qty: 1 EACH | Refills: 3 | Status: SHIPPED | OUTPATIENT
Start: 2020-09-27 | End: 2021-12-03 | Stop reason: SDUPTHER

## 2020-08-27 RX ORDER — FLUTICASONE PROPIONATE AND SALMETEROL 500; 50 UG/1; UG/1
1 POWDER RESPIRATORY (INHALATION)
Qty: 1 EACH | Refills: 0 | Status: SHIPPED | OUTPATIENT
Start: 2020-08-27 | End: 2020-12-07 | Stop reason: DRUGHIGH

## 2020-09-15 ENCOUNTER — IMAGING SERVICES (OUTPATIENT)
Dept: BONE DENSITY | Age: 65
End: 2020-09-15
Attending: FAMILY MEDICINE

## 2020-09-15 ENCOUNTER — E-ADVICE (OUTPATIENT)
Dept: GASTROENTEROLOGY | Age: 65
End: 2020-09-15

## 2020-09-15 ENCOUNTER — IMAGING SERVICES (OUTPATIENT)
Dept: MAMMOGRAPHY | Age: 65
End: 2020-09-15
Attending: FAMILY MEDICINE

## 2020-09-15 DIAGNOSIS — Z12.39 BREAST CANCER SCREENING: ICD-10-CM

## 2020-09-15 DIAGNOSIS — Z78.0 POST-MENOPAUSAL: ICD-10-CM

## 2020-09-15 DIAGNOSIS — Z00.00 PREVENTATIVE HEALTH CARE: ICD-10-CM

## 2020-09-15 PROCEDURE — 77080 DXA BONE DENSITY AXIAL: CPT | Performed by: RADIOLOGY

## 2020-09-15 PROCEDURE — 77063 BREAST TOMOSYNTHESIS BI: CPT | Performed by: RADIOLOGY

## 2020-09-15 PROCEDURE — 77067 SCR MAMMO BI INCL CAD: CPT | Performed by: RADIOLOGY

## 2020-09-16 ENCOUNTER — E-ADVICE (OUTPATIENT)
Dept: FAMILY MEDICINE | Age: 65
End: 2020-09-16

## 2020-09-22 ENCOUNTER — OFFICE VISIT (OUTPATIENT)
Dept: FAMILY MEDICINE | Age: 65
End: 2020-09-22

## 2020-09-22 ENCOUNTER — LAB SERVICES (OUTPATIENT)
Dept: LAB | Age: 65
End: 2020-09-22

## 2020-09-22 VITALS
HEIGHT: 66 IN | WEIGHT: 215 LBS | HEART RATE: 70 BPM | RESPIRATION RATE: 16 BRPM | DIASTOLIC BLOOD PRESSURE: 72 MMHG | SYSTOLIC BLOOD PRESSURE: 128 MMHG | TEMPERATURE: 97.3 F | OXYGEN SATURATION: 97 % | BODY MASS INDEX: 34.55 KG/M2

## 2020-09-22 DIAGNOSIS — R74.02 ELEVATED LDH: ICD-10-CM

## 2020-09-22 DIAGNOSIS — Z51.81 MEDICATION MONITORING ENCOUNTER: ICD-10-CM

## 2020-09-22 DIAGNOSIS — R06.09 DOE (DYSPNEA ON EXERTION): Primary | ICD-10-CM

## 2020-09-22 DIAGNOSIS — R73.01 IMPAIRED FASTING GLUCOSE: ICD-10-CM

## 2020-09-22 DIAGNOSIS — R23.2 HOT FLASHES: ICD-10-CM

## 2020-09-22 DIAGNOSIS — R06.02 SHORTNESS OF BREATH: ICD-10-CM

## 2020-09-22 DIAGNOSIS — R06.09 DOE (DYSPNEA ON EXERTION): ICD-10-CM

## 2020-09-22 LAB
ALBUMIN SERPL-MCNC: 4.1 G/DL (ref 3.6–5.1)
ALP SERPL-CCNC: 84 U/L (ref 45–130)
ALT SERPL W/O P-5'-P-CCNC: 44 U/L (ref 4–38)
AST SERPL-CCNC: 36 U/L (ref 14–43)
BASOPHIL %: 0.4 % (ref 0–1.2)
BASOPHIL ABSOLUTE #: 0 10*3/UL (ref 0–0.1)
BILIRUB SERPL-MCNC: 0.8 MG/DL (ref 0–1.3)
BUN SERPL-MCNC: 15 MG/DL (ref 7–20)
CALCIUM SERPL-MCNC: 10.3 MG/DL (ref 8.6–10.6)
CHLORIDE SERPL-SCNC: 106 MMOL/L (ref 96–107)
CK SERPL-CCNC: 87 U/L (ref 30–135)
CO2 SERPL-SCNC: 30 MMOL/L (ref 22–32)
CREAT SERPL-MCNC: 0.7 MG/DL (ref 0.5–1.4)
DIFFERENTIAL TYPE: ABNORMAL
EOSINOPHIL %: 0.7 % (ref 0–10)
EOSINOPHIL ABSOLUTE #: 0.1 10*3/UL (ref 0–0.5)
EST. AVERAGE GLUCOSE BLD GHB EST-MCNC: 124 MG/DL (ref 0–154)
GFR SERPL CREATININE-BSD FRML MDRD: >60 ML/MIN/{1.73M2}
GFR SERPL CREATININE-BSD FRML MDRD: >60 ML/MIN/{1.73M2}
GLUCOSE SERPL-MCNC: 103 MG/DL (ref 70–200)
HBA1C MFR BLD: 6 % (ref 4.2–6)
HEMATOCRIT: 38.8 % (ref 34–45)
HEMOGLOBIN: 12 G/DL (ref 11.2–15.7)
IMMATURE GRANULOCYTE ABSOLUTE: 0.02 10*3/UL (ref 0–0.05)
IMMATURE GRANULOCYTE PERCENT: 0.2 % (ref 0–0.5)
LDH SERPL P TO L-CCNC: 493 U/L (ref 313–618)
LYMPH PERCENT: 23.8 % (ref 20.5–51.1)
LYMPHOCYTE ABSOLUTE #: 2 10*3/UL (ref 1.2–3.4)
MEAN CORPUSCULAR HGB CONCENTRATION: 30.9 % (ref 32–36)
MEAN CORPUSCULAR HGB: 30 PG (ref 27–34)
MEAN CORPUSCULAR VOLUME: 97 FL (ref 79–95)
MEAN PLATELET VOLUME: 11.5 FL (ref 8.6–12.4)
MONOCYTE ABSOLUTE #: 0.9 10*3/UL (ref 0.2–0.9)
MONOCYTE PERCENT: 10.6 % (ref 4.3–12.9)
NEUTROPHIL ABSOLUTE #: 5.4 10*3/UL (ref 1.4–6.5)
NEUTROPHIL PERCENT: 64.3 % (ref 34–73.5)
PLATELET COUNT: 297 10*3/UL (ref 150–400)
POTASSIUM SERPL-SCNC: 3.6 MMOL/L (ref 3.5–5.3)
PROT SERPL-MCNC: 7 G/DL (ref 6.4–8.5)
RED BLOOD CELL COUNT: 4 10*6/UL (ref 3.7–5.2)
RED CELL DISTRIBUTION WIDTH: 13.4 % (ref 11.3–14.8)
SEDIMENTATION RATE, RBC: 32 MM/H (ref 0–20)
SODIUM SERPL-SCNC: 140 MMOL/L (ref 136–146)
WHITE BLOOD CELL COUNT: 8.4 10*3/UL (ref 4–10)

## 2020-09-22 PROCEDURE — 85025 COMPLETE CBC W/AUTO DIFF WBC: CPT | Performed by: FAMILY MEDICINE

## 2020-09-22 PROCEDURE — 83036 HEMOGLOBIN GLYCOSYLATED A1C: CPT | Performed by: FAMILY MEDICINE

## 2020-09-22 PROCEDURE — 80053 COMPREHEN METABOLIC PANEL: CPT | Performed by: FAMILY MEDICINE

## 2020-09-22 PROCEDURE — 99214 OFFICE O/P EST MOD 30 MIN: CPT | Performed by: FAMILY MEDICINE

## 2020-09-22 PROCEDURE — 83615 LACTATE (LD) (LDH) ENZYME: CPT | Performed by: FAMILY MEDICINE

## 2020-09-22 PROCEDURE — 36415 COLL VENOUS BLD VENIPUNCTURE: CPT | Performed by: FAMILY MEDICINE

## 2020-09-22 PROCEDURE — 82550 ASSAY OF CK (CPK): CPT | Performed by: FAMILY MEDICINE

## 2020-09-22 PROCEDURE — 85652 RBC SED RATE AUTOMATED: CPT | Performed by: FAMILY MEDICINE

## 2020-09-22 SDOH — HEALTH STABILITY: MENTAL HEALTH: HOW OFTEN DO YOU HAVE A DRINK CONTAINING ALCOHOL?: NEVER

## 2020-09-22 ASSESSMENT — PATIENT HEALTH QUESTIONNAIRE - PHQ9
SUM OF ALL RESPONSES TO PHQ9 QUESTIONS 1 AND 2: 1
CLINICAL INTERPRETATION OF PHQ9 SCORE: NO FURTHER SCREENING NEEDED
1. LITTLE INTEREST OR PLEASURE IN DOING THINGS: SEVERAL DAYS
CLINICAL INTERPRETATION OF PHQ2 SCORE: NO FURTHER SCREENING NEEDED
2. FEELING DOWN, DEPRESSED OR HOPELESS: NOT AT ALL
SUM OF ALL RESPONSES TO PHQ9 QUESTIONS 1 AND 2: 1

## 2020-09-23 DIAGNOSIS — R74.8 ELEVATED LIVER ENZYMES: Primary | ICD-10-CM

## 2020-09-23 DIAGNOSIS — R70.0 ELEVATED SED RATE: ICD-10-CM

## 2020-09-24 RX ORDER — OMEPRAZOLE 40 MG/1
CAPSULE, DELAYED RELEASE ORAL
Qty: 90 CAPSULE | Refills: 1 | OUTPATIENT
Start: 2020-09-24

## 2020-09-28 ENCOUNTER — OFFICE VISIT (OUTPATIENT)
Dept: FAMILY MEDICINE | Facility: CLINIC | Age: 65
End: 2020-09-28
Payer: COMMERCIAL

## 2020-09-28 VITALS
HEART RATE: 76 BPM | SYSTOLIC BLOOD PRESSURE: 122 MMHG | RESPIRATION RATE: 12 BRPM | HEIGHT: 71 IN | TEMPERATURE: 97.3 F | BODY MASS INDEX: 41.02 KG/M2 | WEIGHT: 293 LBS | OXYGEN SATURATION: 98 % | DIASTOLIC BLOOD PRESSURE: 74 MMHG

## 2020-09-28 DIAGNOSIS — G47.33 OSA (OBSTRUCTIVE SLEEP APNEA): ICD-10-CM

## 2020-09-28 DIAGNOSIS — E78.5 HYPERLIPIDEMIA LDL GOAL <130: ICD-10-CM

## 2020-09-28 DIAGNOSIS — Z23 NEED FOR VACCINATION: ICD-10-CM

## 2020-09-28 DIAGNOSIS — E66.01 MORBID OBESITY (H): ICD-10-CM

## 2020-09-28 DIAGNOSIS — M17.11 PRIMARY OSTEOARTHRITIS OF RIGHT KNEE: ICD-10-CM

## 2020-09-28 DIAGNOSIS — Z00.00 ENCOUNTER FOR MEDICARE ANNUAL WELLNESS EXAM: Primary | ICD-10-CM

## 2020-09-28 DIAGNOSIS — R94.6 ABNORMAL RESULTS OF THYROID FUNCTION STUDIES: ICD-10-CM

## 2020-09-28 DIAGNOSIS — K21.9 GASTROESOPHAGEAL REFLUX DISEASE, ESOPHAGITIS PRESENCE NOT SPECIFIED: ICD-10-CM

## 2020-09-28 DIAGNOSIS — Z12.31 ENCOUNTER FOR SCREENING MAMMOGRAM FOR BREAST CANCER: ICD-10-CM

## 2020-09-28 LAB
ANION GAP SERPL CALCULATED.3IONS-SCNC: 5 MMOL/L (ref 3–14)
BUN SERPL-MCNC: 11 MG/DL (ref 7–30)
CALCIUM SERPL-MCNC: 9.7 MG/DL (ref 8.5–10.1)
CHLORIDE SERPL-SCNC: 106 MMOL/L (ref 94–109)
CHOLEST SERPL-MCNC: 274 MG/DL
CO2 SERPL-SCNC: 30 MMOL/L (ref 20–32)
CREAT SERPL-MCNC: 0.86 MG/DL (ref 0.52–1.04)
GFR SERPL CREATININE-BSD FRML MDRD: 70 ML/MIN/{1.73_M2}
GLUCOSE SERPL-MCNC: 94 MG/DL (ref 70–99)
HDLC SERPL-MCNC: 72 MG/DL
LDLC SERPL CALC-MCNC: 176 MG/DL
NONHDLC SERPL-MCNC: 202 MG/DL
POTASSIUM SERPL-SCNC: 4 MMOL/L (ref 3.4–5.3)
SODIUM SERPL-SCNC: 141 MMOL/L (ref 133–144)
T4 FREE SERPL-MCNC: 0.88 NG/DL (ref 0.76–1.46)
TRIGL SERPL-MCNC: 128 MG/DL
TSH SERPL DL<=0.005 MIU/L-ACNC: 5.19 MU/L (ref 0.4–4)

## 2020-09-28 PROCEDURE — 90732 PPSV23 VACC 2 YRS+ SUBQ/IM: CPT | Performed by: FAMILY MEDICINE

## 2020-09-28 PROCEDURE — G0010 ADMIN HEPATITIS B VACCINE: HCPCS | Performed by: FAMILY MEDICINE

## 2020-09-28 PROCEDURE — 84439 ASSAY OF FREE THYROXINE: CPT | Performed by: FAMILY MEDICINE

## 2020-09-28 PROCEDURE — 84443 ASSAY THYROID STIM HORMONE: CPT | Performed by: FAMILY MEDICINE

## 2020-09-28 PROCEDURE — 36415 COLL VENOUS BLD VENIPUNCTURE: CPT | Performed by: FAMILY MEDICINE

## 2020-09-28 PROCEDURE — G0009 ADMIN PNEUMOCOCCAL VACCINE: HCPCS | Performed by: FAMILY MEDICINE

## 2020-09-28 PROCEDURE — 90746 HEPB VACCINE 3 DOSE ADULT IM: CPT | Performed by: FAMILY MEDICINE

## 2020-09-28 PROCEDURE — 80061 LIPID PANEL: CPT | Performed by: FAMILY MEDICINE

## 2020-09-28 PROCEDURE — 90472 IMMUNIZATION ADMIN EACH ADD: CPT | Performed by: FAMILY MEDICINE

## 2020-09-28 PROCEDURE — G0438 PPPS, INITIAL VISIT: HCPCS | Performed by: FAMILY MEDICINE

## 2020-09-28 PROCEDURE — G0008 ADMIN INFLUENZA VIRUS VAC: HCPCS | Performed by: FAMILY MEDICINE

## 2020-09-28 PROCEDURE — 80048 BASIC METABOLIC PNL TOTAL CA: CPT | Performed by: FAMILY MEDICINE

## 2020-09-28 PROCEDURE — 90662 IIV NO PRSV INCREASED AG IM: CPT | Performed by: FAMILY MEDICINE

## 2020-09-28 PROCEDURE — 90632 HEPA VACCINE ADULT IM: CPT | Performed by: FAMILY MEDICINE

## 2020-09-28 ASSESSMENT — MIFFLIN-ST. JEOR: SCORE: 1996.45

## 2020-09-28 ASSESSMENT — ENCOUNTER SYMPTOMS
FEVER: 0
CONSTIPATION: 0
JOINT SWELLING: 0
DIARRHEA: 0
DIZZINESS: 0
FREQUENCY: 1
HEADACHES: 0
WEAKNESS: 0
BREAST MASS: 0
ABDOMINAL PAIN: 0
COUGH: 0
DYSURIA: 0
HEARTBURN: 1
NAUSEA: 0
NERVOUS/ANXIOUS: 0
HEMATURIA: 0
EYE PAIN: 0
PARESTHESIAS: 0
CHILLS: 0
PALPITATIONS: 0
SORE THROAT: 0
ARTHRALGIAS: 1
SHORTNESS OF BREATH: 0
HEMATOCHEZIA: 0
MYALGIAS: 0

## 2020-09-28 ASSESSMENT — ACTIVITIES OF DAILY LIVING (ADL): CURRENT_FUNCTION: NO ASSISTANCE NEEDED

## 2020-09-28 NOTE — NURSING NOTE
Prior to immunization administration, verified patients identity using patient s name and date of birth. Please see Immunization Activity for additional information.     Screening Questionnaire for Adult Immunization    Are you sick today?   No   Do you have allergies to medications, food, a vaccine component or latex?   No   Have you ever had a serious reaction after receiving a vaccination?   No   Do you have a long-term health problem with heart, lung, kidney, or metabolic disease (e.g., diabetes), asthma, a blood disorder, no spleen, complement component deficiency, a cochlear implant, or a spinal fluid leak?  Are you on long-term aspirin therapy?   No   Do you have cancer, leukemia, HIV/AIDS, or any other immune system problem?   No   Do you have a parent, brother, or sister with an immune system problem?   No   In the past 3 months, have you taken medications that affect  your immune system, such as prednisone, other steroids, or anticancer drugs; drugs for the treatment of rheumatoid arthritis, Crohn s disease, or psoriasis; or have you had radiation treatments?   No   Have you had a seizure, or a brain or other nervous system problem?   No   During the past year, have you received a transfusion of blood or blood    products, or been given immune (gamma) globulin or antiviral drug?   No   For women: Are you pregnant or is there a chance you could become       pregnant during the next month?   No   Have you received any vaccinations in the past 4 weeks?   No     Immunization questionnaire answers were all negative.        Per orders of Dr. Garcia, injection of hep a and b, and prevnar given by Ailin Menchaca MA. Patient instructed to remain in clinic for 15 minutes afterwards, and to report any adverse reaction to me immediately.       Screening performed by Ailin Menchaca MA on 9/28/2020 at 2:56 PM.

## 2020-09-28 NOTE — PATIENT INSTRUCTIONS
You are eligible for the new shingles vaccine called Shingrix. Please check your insurance coverage for this, and you may get this at any place that gives vaccines.      Please schedule your mammogram at your convenience.      I will notify you with lab results from today.     Please check with your insurance regarding coverage for a bone density test called DEXA scan.  You can schedule this at your convenience.     Keep working on the exercise and diet for weight loss.       Patient Education   Personalized Prevention Plan  You are due for the preventive services outlined below.  Your care team is available to assist you in scheduling these services.  If you have already completed any of these items, please share that information with your care team to update in your medical record.  Health Maintenance Due   Topic Date Due     Osteoporosis Screening  1955     Zoster (Shingles) Vaccine (2 of 3) 11/18/2015     Discuss Advance Care Planning  10/28/2016     Annual Wellness Visit  03/13/2018     FALL RISK ASSESSMENT  03/13/2018     PHQ-2  01/01/2020     Flu Vaccine (1) 09/01/2020     Pneumococcal Vaccine (2 of 2 - PPSV23) 08/01/2020

## 2020-09-28 NOTE — PROGRESS NOTES
"SUBJECTIVE:   Ashli Rogers is a 65 year old female who presents for Preventive Visit.      Patient has been advised of split billing requirements and indicates understanding: Yes   Are you in the first 12 months of your Medicare coverage?  No    Healthy Habits:     In general, how would you rate your overall health?  Good    Frequency of exercise:  2-3 days/week    Duration of exercise:  30-45 minutes    Do you usually eat at least 4 servings of fruit and vegetables a day, include whole grains    & fiber and avoid regularly eating high fat or \"junk\" foods?  Yes    Taking medications regularly:  Yes    Medication side effects:  None    Ability to successfully perform activities of daily living:  No assistance needed    Home Safety:  No safety concerns identified    Hearing Impairment:  No hearing concerns    In the past 6 months, have you been bothered by leaking of urine? Yes    In general, how would you rate your overall mental or emotional health?  Excellent      PHQ-2 Total Score: 0    Additional concerns today:  No    Do you feel safe in your environment? Yes    Have you ever done Advance Care Planning? (For example, a Health Directive, POLST, or a discussion with a medical provider or your loved ones about your wishes): Yes, advance care planning is on file.      Fall risk  Fallen 2 or more times in the past year?: No  Any fall with injury in the past year?: No    Cognitive Screening   1) Repeat 3 items (Leader, Season, Table)    2) Clock draw: NORMAL  3) 3 item recall: Recalls 3 objects  Results: 3 items recalled: COGNITIVE IMPAIRMENT LESS LIKELY    Mini-CogTM Copyright BLAISE Berger. Licensed by the author for use in [facility]; reprinted with permission (silvestre@.[facility]). All rights reserved.      Do you have sleep apnea, excessive snoring or daytime drowsiness?: no    Reviewed and updated as needed this visit by clinical staff  Tobacco  Allergies  Meds  Problems  Med Hx  Surg Hx  Fam Hx  Soc Hx "          Reviewed and updated as needed this visit by Provider  Tobacco  Allergies  Meds  Problems  Med Hx  Surg Hx  Fam Hx        Social History     Tobacco Use     Smoking status: Never Smoker     Smokeless tobacco: Never Used   Substance Use Topics     Alcohol use: Yes     Comment: 2 drinks per month         Alcohol Use 9/28/2020   Prescreen: >3 drinks/day or >7 drinks/week? No   Prescreen: >3 drinks/day or >7 drinks/week? -           Fungus on right foot middle toe, also discuss cost of vyvanse, patient would like to get flu shot, patient would like to discuss meclizine to see if can get refill on that, also right knee if should get injection or get knee surgery. Also discuss urinary trouble and possible medication to help with it     Also had 1 dose of Hep B and would like to complete series.     Current providers sharing in care for this patient include:   Patient Care Team:  Lakisha Wynne APRN CNP as PCP - General (Family Practice)  Zelda Garcia MD as Assigned PCP    The following health maintenance items are reviewed in Epic and correct as of today:  Health Maintenance   Topic Date Due     DEXA  1955     ZOSTER IMMUNIZATION (2 of 3) 11/18/2015     ADVANCE CARE PLANNING  10/28/2016     MEDICARE ANNUAL WELLNESS VISIT  03/13/2018     FALL RISK ASSESSMENT  03/13/2018     PHQ-2  01/01/2020     INFLUENZA VACCINE (1) 09/01/2020     PNEUMOCOCCAL IMMUNIZATION 65+ LOW/MEDIUM RISK (2 of 2 - PPSV23) 08/01/2020     MAMMO SCREENING  08/13/2021     LIPID  08/01/2024     DTAP/TDAP/TD IMMUNIZATION (5 - Td) 05/02/2026     COLORECTAL CANCER SCREENING  05/11/2026     HEPATITIS C SCREENING  Completed     IPV IMMUNIZATION  Aged Out     MENINGITIS IMMUNIZATION  Aged Out     HEPATITIS B IMMUNIZATION  Aged Out     BP Readings from Last 3 Encounters:   09/28/20 122/74   07/31/20 114/72   02/05/20 109/62    Wt Readings from Last 3 Encounters:   09/28/20 135.8 kg (299 lb 6.4 oz)   07/31/20 137.9 kg  (304 lb)   20 138.8 kg (306 lb)                  Patient Active Problem List   Diagnosis     S/P total hip arthroplasty     Varicose veins of lower extremities with complications     Allergic rhinitis     DJD (degenerative joint disease) of knee     Advanced directives, counseling/discussion     ADD (attention deficit disorder)     Gastroesophageal reflux disease, esophagitis presence not specified     Morbid obesity (H)     Hiatal hernia     Diverticulitis of colon     Bunion, left     Pain due to varicose veins of both lower extremities     ZELDA (obstructive sleep apnea)     Hyperlipidemia LDL goal <130     Past Surgical History:   Procedure Laterality Date     C TOTAL HIP ARTHROPLASTY Right 2004    Hip Replacement, Total     C TOTAL KNEE ARTHROPLASTY Left      COLONOSCOPY N/A 2016    Procedure: COLONOSCOPY;  Surgeon: Evrin Johnston MD;  Location: [facility]     ESOPHAGOSCOPY, GASTROSCOPY, DUODENOSCOPY (EGD), COMBINED N/A 2016    Procedure: COMBINED ESOPHAGOSCOPY, GASTROSCOPY, DUODENOSCOPY (EGD), BIOPSY SINGLE OR MULTIPLE;  Surgeon: Ervin Johnston MD;  Location:  GI     FINGER SURGERY Right 2019    ganglion cyst removed     HC COLONOSCOPY W/WO BRUSH/WASH  2006       Social History     Tobacco Use     Smoking status: Never Smoker     Smokeless tobacco: Never Used   Substance Use Topics     Alcohol use: Yes     Comment: 2 drinks per month     Family History   Problem Relation Age of Onset     Cancer Father          of lung cancer     Heart Disease Daughter         2 holes in her heart         Pneumonia Vaccine:Had PCV13, needs PPSV23  Mammogram Screening: Mammogram Screening: Patient over age 50, mutual decision to screen reflected in health maintenance.    Review of Systems   Constitutional: Negative for chills and fever.   HENT: Negative for congestion, ear pain, hearing loss and sore throat.    Eyes: Positive for visual disturbance. Negative for pain.   Respiratory:  "Negative for cough and shortness of breath.    Cardiovascular: Negative for chest pain, palpitations and peripheral edema.   Gastrointestinal: Positive for heartburn. Negative for abdominal pain, constipation, diarrhea, hematochezia and nausea.   Breasts:  Negative for tenderness, breast mass and discharge.   Genitourinary: Positive for frequency and urgency. Negative for dysuria, genital sores, hematuria, pelvic pain, vaginal bleeding and vaginal discharge.   Musculoskeletal: Positive for arthralgias. Negative for joint swelling and myalgias.   Skin: Negative for rash.   Neurological: Negative for dizziness, weakness, headaches and paresthesias.   Psychiatric/Behavioral: Negative for mood changes. The patient is not nervous/anxious.      She is not taking Vyvanse. It was not covered by insurance and she doesn't think she needs it.   Her hearing is declining but not too bad, still functional.   Up to date with eye doctor, has cataracts.   Has dental appt next week.   She is taking her Prilosec prn, not often, using OTC.   She is using Zyrtec daily, using OTC.     OBJECTIVE:   /74   Pulse 76   Temp 97.3  F (36.3  C) (Temporal)   Resp 12   Ht 1.815 m (5' 11.46\")   Wt 135.8 kg (299 lb 6.4 oz)   LMP 01/19/2005   SpO2 98%   BMI 41.23 kg/m   Estimated body mass index is 41.23 kg/m  as calculated from the following:    Height as of this encounter: 1.815 m (5' 11.46\").    Weight as of this encounter: 135.8 kg (299 lb 6.4 oz).  Physical Exam  GENERAL APPEARANCE: healthy, alert and no distress  EYES: Eyes grossly normal to inspection, PERRL and conjunctivae and sclerae normal  HENT: ear canals and TM's normal, nose and mouth without ulcers or lesions, oropharynx clear and oral mucous membranes moist  NECK: no adenopathy, no asymmetry, masses, or scars and thyroid normal to palpation, no bruits.   RESP: lungs clear to auscultation - no rales, rhonchi or wheezes  BREAST: normal without masses, tenderness or nipple " discharge and no palpable axillary masses or adenopathy  CV: regular rate and rhythm, normal S1 S2, no S3 or S4, no murmur, click or rub, no peripheral edema and peripheral pulses strong  ABDOMEN: soft, nontender, no hepatosplenomegaly, no masses and bowel sounds normal  MS: no musculoskeletal defects are noted and gait is age appropriate without ataxia  SKIN: no suspicious lesions or rashes, but on her right 3rd toe she has some mild toenail fungus.   NEURO: Normal strength and tone, sensory exam grossly normal, mentation intact and speech normal  PSYCH: mentation appears normal and affect normal/bright    Diagnostic Test Results:  Orders Placed This Encounter   Procedures     FLUZONE HIGH DOSE 65+  [82428]     HEPATITIS B VACCINE,  ADULT  [89052]     HEPATITIS A VACCINE, ADULT  [47295]     Pneumococcal vaccine 23 valent PPSV23  (Pneumovax) [52524]     ADMIN: Vaccine, Initial (15909)     T4 free        ASSESSMENT / PLAN:       ICD-10-CM    1. Encounter for Medicare annual wellness exam  Z00.00 T4 free   2. Morbid obesity (H)  E66.01 JUST IN CASE     Lipid panel reflex to direct LDL Fasting     Basic metabolic panel  (Ca, Cl, CO2, Creat, Gluc, K, Na, BUN)   3. Abnormal results of thyroid function studies   R94.6 TSH with free T4 reflex   4. Hyperlipidemia LDL goal <130  E78.5 JUST IN CASE     Lipid panel reflex to direct LDL Fasting   5. Gastroesophageal reflux disease, esophagitis presence not specified  K21.9 Basic metabolic panel  (Ca, Cl, CO2, Creat, Gluc, K, Na, BUN)   6. Primary osteoarthritis of right knee  M17.11 Basic metabolic panel  (Ca, Cl, CO2, Creat, Gluc, K, Na, BUN)   7. Need for vaccination  Z23 FLUZONE HIGH DOSE 65+  [99301]     HEPATITIS B VACCINE,  ADULT  [63627]     HEPATITIS A VACCINE, ADULT  [68356]     Pneumococcal vaccine 23 valent PPSV23  (Pneumovax) [72701]     ADMIN: Vaccine, Initial (42459)   8. Encounter for screening mammogram for breast cancer  Z12.31 CANCELED: MA Screen Bilateral  "w/Chucho   9. ZELDA (obstructive sleep apnea)  G47.33        Patient has been advised of split billing requirements and indicates understanding: Yes  Recommend yearly physical and mammogram.  Monthly self breast exam.  See lab orders, will notify with results.    COUNSELING:  Reviewed preventive health counseling, as reflected in patient instructions  Special attention given to:       Regular exercise       Healthy diet/nutrition       Vision screening       Dental care       Fall risk prevention       Immunizations    Vaccinated for: Hepatitis A, Hepatitis B and Pneumococcal             Osteoporosis Prevention/Bone Health       Colon cancer screening is up to date.    Declines any treatment for her toenail. It is mild and limited and not symptomatic.     Estimated body mass index is 41.23 kg/m  as calculated from the following:    Height as of this encounter: 1.815 m (5' 11.46\").    Weight as of this encounter: 135.8 kg (299 lb 6.4 oz).    Weight management plan: Discussed healthy diet and exercise guidelines   Strongly encouraged her to exercise as able and switch to a controlled diet such as weight watchers, Medifast, Mediterranean style diet.    She reports that she has never smoked. She has never used smokeless tobacco.      Appropriate preventive services were discussed with this patient, including applicable screening as appropriate for cardiovascular disease, diabetes, osteopenia/osteoporosis, and glaucoma.  As appropriate for age/gender, discussed screening for colorectal cancer, prostate cancer, breast cancer, and cervical cancer. Checklist reviewing preventive services available has been given to the patient.    Reviewed patients plan of care and provided an AVS. The Basic Care Plan (routine screening as documented in Health Maintenance) for Ashli meets the Care Plan requirement. This Care Plan has been established and reviewed with the Patient.    Counseling Resources:  ATP IV Guidelines  Pooled Cohorts " Equation Calculator  Breast Cancer Risk Calculator  Breast Cancer: Medication to Reduce Risk  FRAX Risk Assessment  ICSI Preventive Guidelines  Dietary Guidelines for Americans, 2010  [employer]'s MyPlate  ASA Prophylaxis  Lung CA Screening    Zelda Garcia MD  [facility]    Identified Health Risks:

## 2020-09-29 NOTE — RESULT ENCOUNTER NOTE
Ashli, here are your lab results. Your sugar is normal, no need to test for diabetes at this time. Your thyroid is on the low side. Your cholesterol is on the high side. I recommend a low dose of thyroid hormone if you are willing at this time. Since it's not severely low, you could continue to monitor it and recheck in 3 months. For the cholesterol, it is also high enough to recommend cholesterol lowering medication. If you can work hard on diet and weight loss, we can hold off cholesterol medication and recheck in 3-6 months and see how much progress you can make on your own. Weight loss will definitely help the cholesterol.   Your electrolytes and kidney function are normal.   Please let me know how you feel about thyroid medication.   Zelda Garcia MD

## 2020-10-01 ENCOUNTER — APPOINTMENT (OUTPATIENT)
Dept: PULMONOLOGY | Age: 65
End: 2020-10-01

## 2020-10-03 ENCOUNTER — IMAGING SERVICES (OUTPATIENT)
Dept: ULTRASOUND IMAGING | Age: 65
End: 2020-10-03
Attending: FAMILY MEDICINE

## 2020-10-03 DIAGNOSIS — R74.8 ELEVATED LIVER ENZYMES: ICD-10-CM

## 2020-10-03 DIAGNOSIS — R70.0 ELEVATED SED RATE: ICD-10-CM

## 2020-10-03 PROCEDURE — 76705 ECHO EXAM OF ABDOMEN: CPT | Performed by: RADIOLOGY

## 2020-10-05 RX ORDER — LOSARTAN POTASSIUM 100 MG/1
100 TABLET ORAL DAILY
Qty: 90 TABLET | Refills: 1 | Status: SHIPPED | OUTPATIENT
Start: 2020-10-05 | End: 2021-03-05 | Stop reason: SDUPTHER

## 2020-10-06 RX ORDER — FLUTICASONE PROPIONATE 50 MCG
SPRAY, SUSPENSION (ML) NASAL
Qty: 48 G | Refills: 3 | Status: SHIPPED | OUTPATIENT
Start: 2020-10-06 | End: 2022-02-28

## 2020-10-07 ENCOUNTER — HOSPITAL ENCOUNTER (OUTPATIENT)
Dept: MAMMOGRAPHY | Facility: CLINIC | Age: 65
Discharge: HOME | End: 2020-10-07
Attending: FAMILY MEDICINE | Admitting: FAMILY MEDICINE
Payer: MEDICARE

## 2020-10-07 DIAGNOSIS — Z12.31 VISIT FOR SCREENING MAMMOGRAM: ICD-10-CM

## 2020-10-07 PROCEDURE — 77067 SCR MAMMO BI INCL CAD: CPT

## 2020-10-08 ENCOUNTER — E-ADVICE (OUTPATIENT)
Dept: FAMILY MEDICINE | Age: 65
End: 2020-10-08

## 2020-10-09 DIAGNOSIS — K86.89 PANCREATIC DUCT DILATED: ICD-10-CM

## 2020-10-09 DIAGNOSIS — R74.8 ELEVATED LIVER ENZYMES: Primary | ICD-10-CM

## 2020-10-12 ENCOUNTER — E-ADVICE (OUTPATIENT)
Dept: FAMILY MEDICINE | Age: 65
End: 2020-10-12

## 2020-10-13 ENCOUNTER — TELEPHONE (OUTPATIENT)
Dept: FAMILY MEDICINE | Facility: CLINIC | Age: 65
End: 2020-10-13

## 2020-10-13 DIAGNOSIS — E78.5 HYPERLIPIDEMIA LDL GOAL <130: Primary | ICD-10-CM

## 2020-10-13 DIAGNOSIS — E03.8 SUBCLINICAL HYPOTHYROIDISM: ICD-10-CM

## 2020-10-13 NOTE — TELEPHONE ENCOUNTER
Reason for Call:  Other call back    Detailed comments: Patient calling wondering about the thyroid and Cholesterol medications she is wanting to try these at the lose dose you recommended, but was unable to reply via My chart and she will be using [facility] in [place] instead of [facility]. Patient is aware she is not in clinic, this week     Phone Number Patient can be reached at: Home number on file [phone]    Best Time: anytime     Can we leave a detailed message on this number? YES    Call taken on 10/13/2020 at 2:07 PM by Miranda Seipel Vaccari

## 2020-10-15 RX ORDER — LEVOTHYROXINE SODIUM 50 UG/1
50 TABLET ORAL DAILY
Qty: 90 TABLET | Refills: 1 | Status: SHIPPED | OUTPATIENT
Start: 2020-10-15 | End: 2021-03-22

## 2020-10-15 RX ORDER — SIMVASTATIN 10 MG
10 TABLET ORAL AT BEDTIME
Qty: 90 TABLET | Refills: 1 | Status: SHIPPED | OUTPATIENT
Start: 2020-10-15 | End: 2021-03-22

## 2020-10-15 NOTE — TELEPHONE ENCOUNTER
I discussed with Ashli and she is willing to try meds, see orders. Will recheck labs in 3 months.   Zelda Garcia MD

## 2020-10-21 ENCOUNTER — E-ADVICE (OUTPATIENT)
Dept: OPTOMETRY | Age: 65
End: 2020-10-21

## 2020-10-23 ENCOUNTER — IMAGING SERVICES (OUTPATIENT)
Dept: MRI IMAGING | Age: 65
End: 2020-10-23
Attending: FAMILY MEDICINE

## 2020-10-23 DIAGNOSIS — K86.89 PANCREATIC DUCT DILATED: ICD-10-CM

## 2020-10-23 DIAGNOSIS — R74.8 ELEVATED LIVER ENZYMES: ICD-10-CM

## 2020-10-23 PROCEDURE — 74183 MRI ABD W/O CNTR FLWD CNTR: CPT

## 2020-10-23 PROCEDURE — A9585 GADOBUTROL INJECTION: HCPCS

## 2020-10-23 RX ORDER — GADOBUTROL 604.72 MG/ML
10 INJECTION INTRAVENOUS ONCE
Status: COMPLETED | OUTPATIENT
Start: 2020-10-23 | End: 2020-10-23

## 2020-10-23 RX ADMIN — GADOBUTROL 9 ML: 604.72 INJECTION INTRAVENOUS at 17:00

## 2020-10-29 ENCOUNTER — E-ADVICE (OUTPATIENT)
Dept: ALLERGY | Age: 65
End: 2020-10-29

## 2020-11-02 ENCOUNTER — E-ADVICE (OUTPATIENT)
Dept: FAMILY MEDICINE | Age: 65
End: 2020-11-02

## 2020-11-05 ENCOUNTER — ANCILLARY PROCEDURE (OUTPATIENT)
Dept: CARDIOLOGY | Age: 65
End: 2020-11-05
Attending: FAMILY MEDICINE

## 2020-11-05 DIAGNOSIS — R06.02 SHORTNESS OF BREATH: ICD-10-CM

## 2020-11-05 DIAGNOSIS — R94.39 ABNORMAL STRESS TEST: ICD-10-CM

## 2020-11-05 DIAGNOSIS — I45.9 SKIPPED HEART BEATS: ICD-10-CM

## 2020-11-11 PROCEDURE — 93227 XTRNL ECG REC<48 HR R&I: CPT | Performed by: INTERNAL MEDICINE

## 2020-11-19 ENCOUNTER — TELEPHONE (OUTPATIENT)
Dept: CARDIOLOGY | Age: 65
End: 2020-11-19

## 2020-11-24 ENCOUNTER — TELEPHONE (OUTPATIENT)
Dept: PULMONOLOGY | Age: 65
End: 2020-11-24

## 2020-11-25 ENCOUNTER — APPOINTMENT (OUTPATIENT)
Dept: PULMONOLOGY | Age: 65
End: 2020-11-25
Attending: INTERNAL MEDICINE

## 2020-11-27 ENCOUNTER — APPOINTMENT (OUTPATIENT)
Dept: PULMONOLOGY | Age: 65
End: 2020-11-27
Attending: INTERNAL MEDICINE

## 2020-12-04 ENCOUNTER — TELEPHONE (OUTPATIENT)
Dept: PULMONOLOGY | Age: 65
End: 2020-12-04

## 2020-12-07 ENCOUNTER — OFFICE VISIT (OUTPATIENT)
Dept: PULMONOLOGY | Age: 65
End: 2020-12-07
Attending: INTERNAL MEDICINE

## 2020-12-07 DIAGNOSIS — R06.09 DOE (DYSPNEA ON EXERTION): Primary | ICD-10-CM

## 2020-12-07 PROCEDURE — 94727 GAS DIL/WSHOT DETER LNG VOL: CPT | Performed by: INTERNAL MEDICINE

## 2020-12-07 PROCEDURE — 94010 BREATHING CAPACITY TEST: CPT | Performed by: INTERNAL MEDICINE

## 2020-12-07 PROCEDURE — 94729 DIFFUSING CAPACITY: CPT | Performed by: INTERNAL MEDICINE

## 2020-12-07 ASSESSMENT — PAIN SCALES - GENERAL: PAINLEVEL: 0

## 2020-12-20 ENCOUNTER — E-ADVICE (OUTPATIENT)
Dept: FAMILY MEDICINE | Age: 65
End: 2020-12-20

## 2020-12-20 ENCOUNTER — E-ADVICE (OUTPATIENT)
Dept: CARDIOLOGY | Age: 65
End: 2020-12-20

## 2020-12-23 ENCOUNTER — E-ADVICE (OUTPATIENT)
Dept: FAMILY MEDICINE | Age: 65
End: 2020-12-23

## 2020-12-28 ENCOUNTER — E-ADVICE (OUTPATIENT)
Dept: CARDIOLOGY | Age: 65
End: 2020-12-28

## 2020-12-29 ENCOUNTER — OFFICE VISIT (OUTPATIENT)
Dept: OPTOMETRY | Age: 65
End: 2020-12-29

## 2020-12-29 DIAGNOSIS — H52.223 REGULAR ASTIGMATISM OF BOTH EYES: ICD-10-CM

## 2020-12-29 DIAGNOSIS — H52.13 MYOPIA OF BOTH EYES: ICD-10-CM

## 2020-12-29 DIAGNOSIS — H25.13 NUCLEAR AGE-RELATED CATARACT, BOTH EYES: ICD-10-CM

## 2020-12-29 DIAGNOSIS — H52.4 PRESBYOPIA: ICD-10-CM

## 2020-12-29 DIAGNOSIS — H40.053 BORDERLINE GLAUCOMA OF BOTH EYES WITH OCULAR HYPERTENSION: Primary | ICD-10-CM

## 2020-12-29 PROCEDURE — 92133 CPTRZD OPH DX IMG PST SGM ON: CPT | Performed by: OPTOMETRIST

## 2020-12-29 PROCEDURE — 92015 DETERMINE REFRACTIVE STATE: CPT | Performed by: OPTOMETRIST

## 2020-12-29 PROCEDURE — 92014 COMPRE OPH EXAM EST PT 1/>: CPT | Performed by: OPTOMETRIST

## 2020-12-29 SDOH — HEALTH STABILITY: MENTAL HEALTH: HOW OFTEN DO YOU HAVE A DRINK CONTAINING ALCOHOL?: NEVER

## 2020-12-29 ASSESSMENT — KERATOMETRY
OD_K1POWER_DIOPTERS: 42.25
OS_AXISANGLE_DEGREES: 090
OS_K1POWER_DIOPTERS: 42.25
OD_K2POWER_DIOPTERS: 43.25
OD_AXISANGLE_DEGREES: 090
OD_AXISANGLE2_DEGREES: 180
OS_K2POWER_DIOPTERS: 43.25
OS_AXISANGLE2_DEGREES: 180

## 2020-12-29 ASSESSMENT — VISUAL ACUITY
CORRECTION_TYPE: GLASSES
OD_CC: 20/20
METHOD: SNELLEN - LINEAR
OS_CC: 20/20
OD_CC: J1@16""
OD_CC+: -2
OS_CC+: -1

## 2020-12-29 ASSESSMENT — CUP TO DISC RATIO
OS_RATIO: 0.5
OD_RATIO: 0.5

## 2020-12-29 ASSESSMENT — REFRACTION_MANIFEST
OS_CYLINDER: +1.25
OD_CYLINDER: +0.75
OD_ADD: +2.25
OS_ADD: +2.25
OD_SPHERE: -3.50
OS_SPHERE: -4.00
OD_AXIS: 120
OS_AXIS: 070

## 2020-12-29 ASSESSMENT — REFRACTION_WEARINGRX
OS_SPHERE: -4.00
OD_AXIS: 119
OS_AXIS: 070
OS_CYLINDER: +1.50
OD_ADD: +2.25
OD_CYLINDER: +1.25
OS_ADD: +2.25
OD_SPHERE: -3.75

## 2020-12-29 ASSESSMENT — SLIT LAMP EXAM - LIDS
COMMENTS: NORMAL
COMMENTS: NORMAL

## 2020-12-29 ASSESSMENT — CONF VISUAL FIELD
OS_NORMAL: 1
OD_NORMAL: 1

## 2020-12-29 ASSESSMENT — EXTERNAL EXAM - LEFT EYE: OS_EXAM: NORMAL

## 2020-12-29 ASSESSMENT — EXTERNAL EXAM - RIGHT EYE: OD_EXAM: NORMAL

## 2020-12-29 ASSESSMENT — TONOMETRY
OD_IOP_MMHG: 18
OS_IOP_MMHG: 18
IOP_METHOD: APPLANATION

## 2021-01-05 DIAGNOSIS — I10 ESSENTIAL HYPERTENSION: ICD-10-CM

## 2021-01-06 RX ORDER — HYDROCHLOROTHIAZIDE 25 MG/1
50 TABLET ORAL DAILY
Qty: 90 TABLET | Refills: 3 | Status: SHIPPED | OUTPATIENT
Start: 2021-01-06 | End: 2021-03-05 | Stop reason: DRUGHIGH

## 2021-01-16 ENCOUNTER — E-ADVICE (OUTPATIENT)
Dept: FAMILY MEDICINE | Age: 66
End: 2021-01-16

## 2021-01-24 DIAGNOSIS — E03.9 HYPOTHYROIDISM, UNSPECIFIED TYPE: ICD-10-CM

## 2021-01-25 RX ORDER — MONTELUKAST SODIUM 10 MG/1
10 TABLET ORAL NIGHTLY
Qty: 30 TABLET | Refills: 0 | OUTPATIENT
Start: 2021-01-25

## 2021-01-25 RX ORDER — MONTELUKAST SODIUM 10 MG/1
10 TABLET ORAL NIGHTLY
Qty: 30 TABLET | Refills: 5 | Status: SHIPPED | OUTPATIENT
Start: 2021-01-25 | End: 2021-05-28 | Stop reason: SDUPTHER

## 2021-01-26 RX ORDER — LEVOTHYROXINE SODIUM 137 UG/1
137 TABLET ORAL DAILY
Qty: 90 TABLET | Refills: 0 | Status: SHIPPED | OUTPATIENT
Start: 2021-01-26 | End: 2021-03-05 | Stop reason: SDUPTHER

## 2021-01-28 ENCOUNTER — TELEPHONE (OUTPATIENT)
Dept: FAMILY MEDICINE | Age: 66
End: 2021-01-28

## 2021-01-29 RX ORDER — VERAPAMIL HYDROCHLORIDE 180 MG/1
180 CAPSULE, EXTENDED RELEASE ORAL NIGHTLY
Qty: 30 CAPSULE | Refills: 0 | Status: SHIPPED | OUTPATIENT
Start: 2021-01-29 | End: 2021-03-05 | Stop reason: SDUPTHER

## 2021-02-08 ENCOUNTER — OFFICE VISIT (OUTPATIENT)
Dept: CARDIOLOGY | Age: 66
End: 2021-02-08

## 2021-02-08 VITALS
HEART RATE: 100 BPM | WEIGHT: 216 LBS | BODY MASS INDEX: 33.9 KG/M2 | DIASTOLIC BLOOD PRESSURE: 76 MMHG | HEIGHT: 67 IN | SYSTOLIC BLOOD PRESSURE: 134 MMHG | OXYGEN SATURATION: 100 %

## 2021-02-08 DIAGNOSIS — E78.5 DYSLIPIDEMIA: ICD-10-CM

## 2021-02-08 DIAGNOSIS — I10 ESSENTIAL HYPERTENSION: Primary | ICD-10-CM

## 2021-02-08 PROCEDURE — 99214 OFFICE O/P EST MOD 30 MIN: CPT | Performed by: INTERNAL MEDICINE

## 2021-02-08 PROCEDURE — 93000 ELECTROCARDIOGRAM COMPLETE: CPT | Performed by: INTERNAL MEDICINE

## 2021-02-08 SDOH — HEALTH STABILITY: MENTAL HEALTH: HOW OFTEN DO YOU HAVE A DRINK CONTAINING ALCOHOL?: NEVER

## 2021-02-10 ENCOUNTER — E-ADVICE (OUTPATIENT)
Dept: FAMILY MEDICINE | Age: 66
End: 2021-02-10

## 2021-02-18 ENCOUNTER — TELEPHONE (OUTPATIENT)
Dept: ALLERGY | Age: 66
End: 2021-02-18

## 2021-02-18 ASSESSMENT — ENCOUNTER SYMPTOMS
VOMITING: 0
DIARRHEA: 0
WHEEZING: 0
NERVOUS/ANXIOUS: 0
APPETITE CHANGE: 0
FACIAL SWELLING: 0
HEADACHES: 0
ADENOPATHY: 0
ABDOMINAL PAIN: 0
SORE THROAT: 0
SLEEP DISTURBANCE: 0
CHEST TIGHTNESS: 0
FEVER: 0

## 2021-02-19 ENCOUNTER — APPOINTMENT (OUTPATIENT)
Dept: ALLERGY | Age: 66
End: 2021-02-19

## 2021-02-19 ENCOUNTER — OFFICE VISIT (OUTPATIENT)
Dept: ALLERGY | Age: 66
End: 2021-02-19

## 2021-02-19 VITALS
SYSTOLIC BLOOD PRESSURE: 126 MMHG | WEIGHT: 216 LBS | HEART RATE: 76 BPM | HEIGHT: 67 IN | TEMPERATURE: 97.3 F | DIASTOLIC BLOOD PRESSURE: 70 MMHG | BODY MASS INDEX: 33.9 KG/M2

## 2021-02-19 DIAGNOSIS — J31.0 MIXED RHINITIS: ICD-10-CM

## 2021-02-19 DIAGNOSIS — K21.9 GASTROESOPHAGEAL REFLUX DISEASE, UNSPECIFIED WHETHER ESOPHAGITIS PRESENT: ICD-10-CM

## 2021-02-19 DIAGNOSIS — J45.30 MILD PERSISTENT ALLERGIC ASTHMA: Primary | ICD-10-CM

## 2021-02-19 DIAGNOSIS — F41.9 ANXIETY: ICD-10-CM

## 2021-02-19 DIAGNOSIS — R06.02 SHORTNESS OF BREATH: ICD-10-CM

## 2021-02-19 PROCEDURE — 94010 BREATHING CAPACITY TEST: CPT | Performed by: ALLERGY & IMMUNOLOGY

## 2021-02-19 PROCEDURE — 99215 OFFICE O/P EST HI 40 MIN: CPT | Performed by: ALLERGY & IMMUNOLOGY

## 2021-02-20 ENCOUNTER — TELEPHONE (OUTPATIENT)
Dept: FAMILY MEDICINE | Age: 66
End: 2021-02-20

## 2021-02-26 ENCOUNTER — LAB SERVICES (OUTPATIENT)
Dept: LAB | Age: 66
End: 2021-02-26

## 2021-02-26 DIAGNOSIS — E78.5 DYSLIPIDEMIA: ICD-10-CM

## 2021-02-26 DIAGNOSIS — I10 ESSENTIAL HYPERTENSION: ICD-10-CM

## 2021-02-26 LAB
ALBUMIN SERPL-MCNC: 4.2 G/DL (ref 3.6–5.1)
ALP SERPL-CCNC: 93 U/L (ref 45–130)
ALT SERPL W/O P-5'-P-CCNC: 56 U/L (ref 4–38)
AST SERPL-CCNC: 43 U/L (ref 14–43)
BILIRUB SERPL-MCNC: 0.7 MG/DL (ref 0–1.3)
BUN SERPL-MCNC: 20 MG/DL (ref 7–20)
CALCIUM SERPL-MCNC: 10 MG/DL (ref 8.6–10.6)
CHLORIDE SERPL-SCNC: 103 MMOL/L (ref 96–107)
CHOLEST SERPL-MCNC: 152 MG/DL (ref 140–200)
CO2 SERPL-SCNC: 32 MMOL/L (ref 22–32)
CREAT SERPL-MCNC: 0.7 MG/DL (ref 0.5–1.4)
GFR SERPL CREATININE-BSD FRML MDRD: >60 ML/MIN/{1.73M2}
GFR SERPL CREATININE-BSD FRML MDRD: >60 ML/MIN/{1.73M2}
GLUCOSE P FAST SERPL-MCNC: 92 MG/DL (ref 60–100)
HDLC SERPL-MCNC: 56 MG/DL
LDLC SERPL CALC-MCNC: 83 MG/DL (ref 30–100)
POTASSIUM SERPL-SCNC: 4.1 MMOL/L (ref 3.5–5.3)
PROT SERPL-MCNC: 7.2 G/DL (ref 6.4–8.5)
SODIUM SERPL-SCNC: 141 MMOL/L (ref 136–146)
TRIGL SERPL-MCNC: 63 MG/DL (ref 0–200)
TSH SERPL DL<=0.05 MIU/L-ACNC: 1.5 M[IU]/L (ref 0.3–4.82)

## 2021-02-26 PROCEDURE — 36415 COLL VENOUS BLD VENIPUNCTURE: CPT | Performed by: INTERNAL MEDICINE

## 2021-02-26 PROCEDURE — 80053 COMPREHEN METABOLIC PANEL: CPT | Performed by: INTERNAL MEDICINE

## 2021-02-26 PROCEDURE — 80061 LIPID PANEL: CPT | Performed by: INTERNAL MEDICINE

## 2021-02-26 PROCEDURE — 84443 ASSAY THYROID STIM HORMONE: CPT | Performed by: INTERNAL MEDICINE

## 2021-03-05 ENCOUNTER — OFFICE VISIT (OUTPATIENT)
Dept: FAMILY MEDICINE | Age: 66
End: 2021-03-05

## 2021-03-05 VITALS
RESPIRATION RATE: 18 BRPM | DIASTOLIC BLOOD PRESSURE: 70 MMHG | SYSTOLIC BLOOD PRESSURE: 130 MMHG | BODY MASS INDEX: 34.34 KG/M2 | HEART RATE: 79 BPM | WEIGHT: 216 LBS | OXYGEN SATURATION: 98 % | TEMPERATURE: 97.3 F

## 2021-03-05 DIAGNOSIS — E03.9 HYPOTHYROIDISM, UNSPECIFIED TYPE: ICD-10-CM

## 2021-03-05 DIAGNOSIS — I10 ESSENTIAL HYPERTENSION: Primary | ICD-10-CM

## 2021-03-05 PROCEDURE — 99214 OFFICE O/P EST MOD 30 MIN: CPT | Performed by: FAMILY MEDICINE

## 2021-03-05 RX ORDER — HYDROCHLOROTHIAZIDE 25 MG/1
25 TABLET ORAL DAILY
Qty: 90 TABLET | Refills: 3 | Status: SHIPPED | COMMUNITY
Start: 2021-03-05 | End: 2021-11-15

## 2021-03-05 RX ORDER — VERAPAMIL HYDROCHLORIDE 180 MG/1
180 CAPSULE, EXTENDED RELEASE ORAL NIGHTLY
Qty: 90 CAPSULE | Refills: 3 | Status: SHIPPED | OUTPATIENT
Start: 2021-03-05 | End: 2022-01-20 | Stop reason: SDUPTHER

## 2021-03-05 RX ORDER — LOSARTAN POTASSIUM 100 MG/1
100 TABLET ORAL DAILY
Qty: 90 TABLET | Refills: 3 | Status: SHIPPED | OUTPATIENT
Start: 2021-03-05 | End: 2021-12-29 | Stop reason: SDUPTHER

## 2021-03-05 RX ORDER — LEVOTHYROXINE SODIUM 137 UG/1
137 TABLET ORAL DAILY
Qty: 90 TABLET | Refills: 3 | Status: SHIPPED | OUTPATIENT
Start: 2021-03-05 | End: 2022-01-20 | Stop reason: SDUPTHER

## 2021-03-05 SDOH — HEALTH STABILITY: PHYSICAL HEALTH: ON AVERAGE, HOW MANY DAYS PER WEEK DO YOU ENGAGE IN MODERATE TO STRENUOUS EXERCISE (LIKE A BRISK WALK)?: 3 DAYS

## 2021-03-05 SDOH — HEALTH STABILITY: MENTAL HEALTH: HOW OFTEN DO YOU HAVE A DRINK CONTAINING ALCOHOL?: NEVER

## 2021-03-05 SDOH — HEALTH STABILITY: PHYSICAL HEALTH: ON AVERAGE, HOW MANY MINUTES DO YOU ENGAGE IN EXERCISE AT THIS LEVEL?: 30 MIN

## 2021-03-05 ASSESSMENT — PATIENT HEALTH QUESTIONNAIRE - PHQ9
SUM OF ALL RESPONSES TO PHQ9 QUESTIONS 1 AND 2: 0
SUM OF ALL RESPONSES TO PHQ9 QUESTIONS 1 AND 2: 0
CLINICAL INTERPRETATION OF PHQ9 SCORE: NO FURTHER SCREENING NEEDED
CLINICAL INTERPRETATION OF PHQ2 SCORE: NO FURTHER SCREENING NEEDED
2. FEELING DOWN, DEPRESSED OR HOPELESS: NOT AT ALL
1. LITTLE INTEREST OR PLEASURE IN DOING THINGS: NOT AT ALL

## 2021-03-16 ENCOUNTER — VIRTUAL VISIT (OUTPATIENT)
Dept: FAMILY MEDICINE | Facility: CLINIC | Age: 66
End: 2021-03-16
Payer: COMMERCIAL

## 2021-03-16 DIAGNOSIS — E78.5 HYPERLIPIDEMIA LDL GOAL <130: ICD-10-CM

## 2021-03-16 DIAGNOSIS — E03.8 SUBCLINICAL HYPOTHYROIDISM: ICD-10-CM

## 2021-03-16 PROCEDURE — 99213 OFFICE O/P EST LOW 20 MIN: CPT | Mod: GT | Performed by: FAMILY MEDICINE

## 2021-03-16 RX ORDER — SIMVASTATIN 10 MG
10 TABLET ORAL AT BEDTIME
Qty: 90 TABLET | Refills: 1 | Status: CANCELLED | OUTPATIENT
Start: 2021-03-16

## 2021-03-16 RX ORDER — LEVOTHYROXINE SODIUM 50 UG/1
50 TABLET ORAL DAILY
Qty: 90 TABLET | Refills: 1 | Status: CANCELLED | OUTPATIENT
Start: 2021-03-16

## 2021-03-16 NOTE — PROGRESS NOTES
Ashli is a 66 year old who is being evaluated via a billable video visit.      Video visit performed in lieu of an in-person visit due to current concerns regarding social distancing and keeping people safe.  Physician and patient agreed this was appropriate for concerns addressed.     How would you like to obtain your AVS? MyChart  If the video visit is dropped, the invitation should be resent by: Text to cell phone: [phone]  Will anyone else be joining your video visit? No    Video Start Time: 11:17 AM    Assessment & Plan       ICD-10-CM    1. Subclinical hypothyroidism  E03.9    2. Hyperlipidemia LDL goal <130  E78.5       She will come in next few days for fasting labs and will notify her with results, then either adjust or refill her medications as indicated.     15 minutes spent on the date of the encounter doing chart review, history and exam, documentation and further activities as noted above      No follow-ups on file.    Zelda Garcia MD  [facility]   Ashli is a 66 year old who presents for the following health issues     HPI     Medication Followup of levothyroxine and simvastatin     Taking Medication as prescribed: yes    Side Effects:  None    Medication Helping Symptoms:  yes     In October I started her on Synthroid and simvastatin for hypothyroidism and hyperlipidemia.   She is doing well.   Occasionally forgets to take her simvastatin.   Otherwise she has no concerns about the medications. She has not yet had lab recheck.     Review of Systems   Constitutional, HEENT, cardiovascular, pulmonary, gi and gu systems are negative, except as otherwise noted.      Objective           Vitals:  No vitals were obtained today due to virtual visit.    Physical Exam   GENERAL: Healthy, alert and no distress  EYES: Eyes grossly normal to inspection.  No discharge or erythema, or obvious scleral/conjunctival abnormalities.  RESP: No audible wheeze, cough,  or visible cyanosis.  No visible retractions or increased work of breathing.    SKIN: Visible skin clear. No significant rash, abnormal pigmentation or lesions.  NEURO: Cranial nerves grossly intact.  Mentation and speech appropriate for age.  PSYCH: Mentation appears normal, affect normal/bright, judgement and insight intact, normal speech and appearance well-groomed.    No orders of the defined types were placed in this encounter.           Video-Visit Details    Type of service:  Video Visit    Video End Time:11:28 AM    Originating Location (pt. Location): Home    Distant Location (provider location):  [facility]     Platform used for Video Visit: Zuzu[employer]

## 2021-03-17 DIAGNOSIS — E03.8 SUBCLINICAL HYPOTHYROIDISM: ICD-10-CM

## 2021-03-17 DIAGNOSIS — E78.5 HYPERLIPIDEMIA LDL GOAL <130: ICD-10-CM

## 2021-03-17 LAB
ALT SERPL W P-5'-P-CCNC: 31 U/L (ref 0–50)
CHOLEST SERPL-MCNC: 166 MG/DL
HDLC SERPL-MCNC: 66 MG/DL
LDLC SERPL CALC-MCNC: 76 MG/DL
NONHDLC SERPL-MCNC: 100 MG/DL
T4 FREE SERPL-MCNC: 0.89 NG/DL (ref 0.76–1.46)
TRIGL SERPL-MCNC: 121 MG/DL
TSH SERPL DL<=0.005 MIU/L-ACNC: 4.9 MU/L (ref 0.4–4)

## 2021-03-17 PROCEDURE — 80061 LIPID PANEL: CPT | Performed by: FAMILY MEDICINE

## 2021-03-17 PROCEDURE — 84460 ALANINE AMINO (ALT) (SGPT): CPT | Performed by: FAMILY MEDICINE

## 2021-03-17 PROCEDURE — 84439 ASSAY OF FREE THYROXINE: CPT | Performed by: FAMILY MEDICINE

## 2021-03-17 PROCEDURE — 84443 ASSAY THYROID STIM HORMONE: CPT | Performed by: FAMILY MEDICINE

## 2021-03-17 PROCEDURE — 36415 COLL VENOUS BLD VENIPUNCTURE: CPT | Performed by: FAMILY MEDICINE

## 2021-03-22 DIAGNOSIS — E03.8 SUBCLINICAL HYPOTHYROIDISM: ICD-10-CM

## 2021-03-22 DIAGNOSIS — E78.5 HYPERLIPIDEMIA LDL GOAL <130: ICD-10-CM

## 2021-03-22 RX ORDER — LEVOTHYROXINE SODIUM 75 UG/1
75 TABLET ORAL DAILY
Qty: 90 TABLET | Refills: 0 | Status: SHIPPED | OUTPATIENT
Start: 2021-03-22 | End: 2021-07-01

## 2021-03-22 RX ORDER — SIMVASTATIN 10 MG
10 TABLET ORAL AT BEDTIME
Qty: 90 TABLET | Refills: 3 | Status: SHIPPED | OUTPATIENT
Start: 2021-03-22 | End: 2022-05-11

## 2021-03-22 RX ORDER — OMEPRAZOLE 40 MG/1
CAPSULE, DELAYED RELEASE ORAL
Qty: 180 CAPSULE | Refills: 3 | Status: SHIPPED | OUTPATIENT
Start: 2021-03-22 | End: 2022-03-14

## 2021-03-22 NOTE — RESULT ENCOUNTER NOTE
Ashli, your cholesterol levels are very good, and your liver test is normal. Your thyroid, however, is still too low. I'd like to raise your thyroid medication dose to 75 mcg daily and recheck you again in 2 months. I sent in a new Rx to the pharmacy. I refilled your simvastatin for 1 year, I'd like you to continue that.   Zelda Garcia MD

## 2021-04-07 RX ORDER — CLOBETASOL PROPIONATE 0.5 MG/G
OINTMENT TOPICAL
Qty: 30 G | Refills: 0 | Status: SHIPPED | OUTPATIENT
Start: 2021-04-07 | End: 2021-12-16 | Stop reason: SDUPTHER

## 2021-04-08 ENCOUNTER — TRANSFERRED RECORDS (OUTPATIENT)
Dept: HEALTH INFORMATION MANAGEMENT | Facility: CLINIC | Age: 66
End: 2021-04-08

## 2021-04-09 DIAGNOSIS — E03.8 SUBCLINICAL HYPOTHYROIDISM: ICD-10-CM

## 2021-04-09 RX ORDER — LEVOTHYROXINE SODIUM 50 UG/1
TABLET ORAL
Qty: 90 TABLET | Refills: 1 | OUTPATIENT
Start: 2021-04-09

## 2021-04-09 NOTE — TELEPHONE ENCOUNTER
"Denied, sent 3/22/2021 for 90 tablets to this pharmacy    Requested Prescriptions   Pending Prescriptions Disp Refills     levothyroxine (SYNTHROID/LEVOTHROID) 50 MCG tablet [Pharmacy Med Name: LEVOTHYROXINE SODIUM 50MCG TABS] 90 tablet 1     Sig: TAKE ONE TABLET BY MOUTH ONCE DAILY   Last Written Prescription Date:  3/22/2021  Last Fill Quantity: 90,  # refills: 1   Last office visit: 3/16/2021 with prescribing provider:     Future Office Visit:        Thyroid Protocol Failed - 4/9/2021  1:05 AM        Failed - Normal TSH on file in past 12 months     Recent Labs   Lab Test 03/17/21  1051   TSH 4.90*           Passed - Patient is 12 years or older        Passed - Recent (12 mo) or future (30 days) visit within the authorizing provider's specialty     Patient has had an office visit with the authorizing provider or a provider within the authorizing providers department within the previous 12 mos or has a future within next 30 days. See \"Patient Info\" tab in inbasket, or \"Choose Columns\" in Meds & Orders section of the refill encounter.            Passed - Medication is active on med list        Passed - No active pregnancy on record     If patient is pregnant or has had a positive pregnancy test, please check TSH.        Passed - No positive pregnancy test in past 12 months     If patient is pregnant or has had a positive pregnancy test, please check TSH.           Arabella Babin RN      "

## 2021-04-30 ENCOUNTER — APPOINTMENT (OUTPATIENT)
Dept: ALLERGY | Age: 66
End: 2021-04-30

## 2021-05-07 ENCOUNTER — EXTERNAL RECORD (OUTPATIENT)
Dept: HEALTH INFORMATION MANAGEMENT | Facility: OTHER | Age: 66
End: 2021-05-07

## 2021-05-24 ENCOUNTER — TELEPHONE (OUTPATIENT)
Dept: ALLERGY | Age: 66
End: 2021-05-24

## 2021-05-25 VITALS
WEIGHT: 230 LBS | DIASTOLIC BLOOD PRESSURE: 72 MMHG | HEIGHT: 67 IN | RESPIRATION RATE: 16 BRPM | SYSTOLIC BLOOD PRESSURE: 128 MMHG | WEIGHT: 218 LBS | OXYGEN SATURATION: 97 % | BODY MASS INDEX: 36.1 KG/M2 | BODY MASS INDEX: 34.21 KG/M2 | HEIGHT: 67 IN | TEMPERATURE: 98.8 F | HEART RATE: 77 BPM | RESPIRATION RATE: 14 BRPM

## 2021-05-26 ASSESSMENT — ENCOUNTER SYMPTOMS
SLEEP DISTURBANCE: 0
FEVER: 0
NERVOUS/ANXIOUS: 0
VOMITING: 0
HEADACHES: 0
SORE THROAT: 0
APPETITE CHANGE: 0
ABDOMINAL PAIN: 0
FACIAL SWELLING: 0
CHEST TIGHTNESS: 0
DIARRHEA: 0
ADENOPATHY: 0
WHEEZING: 0

## 2021-05-28 ENCOUNTER — TELEPHONE (OUTPATIENT)
Dept: ALLERGY | Age: 66
End: 2021-05-28

## 2021-05-28 ENCOUNTER — OFFICE VISIT (OUTPATIENT)
Dept: ALLERGY | Age: 66
End: 2021-05-28

## 2021-05-28 VITALS — DIASTOLIC BLOOD PRESSURE: 76 MMHG | HEART RATE: 82 BPM | SYSTOLIC BLOOD PRESSURE: 124 MMHG | TEMPERATURE: 98.5 F

## 2021-05-28 DIAGNOSIS — J45.30 MILD PERSISTENT ALLERGIC ASTHMA: Primary | ICD-10-CM

## 2021-05-28 DIAGNOSIS — J31.0 MIXED RHINITIS: ICD-10-CM

## 2021-05-28 DIAGNOSIS — K21.9 GASTROESOPHAGEAL REFLUX DISEASE, UNSPECIFIED WHETHER ESOPHAGITIS PRESENT: ICD-10-CM

## 2021-05-28 PROCEDURE — 99214 OFFICE O/P EST MOD 30 MIN: CPT | Performed by: ALLERGY & IMMUNOLOGY

## 2021-05-28 RX ORDER — FLUTICASONE FUROATE AND VILANTEROL TRIFENATATE 100; 25 UG/1; UG/1
1 POWDER RESPIRATORY (INHALATION) DAILY
Qty: 1 EACH | Refills: 5 | Status: SHIPPED | OUTPATIENT
Start: 2021-05-28 | End: 2021-07-19

## 2021-05-28 RX ORDER — FLUTICASONE PROPIONATE AND SALMETEROL 100; 50 UG/1; UG/1
1 POWDER RESPIRATORY (INHALATION)
Qty: 3 EACH | Refills: 3 | Status: CANCELLED | OUTPATIENT
Start: 2021-05-28

## 2021-05-28 RX ORDER — FLUTICASONE FUROATE AND VILANTEROL TRIFENATATE 100; 25 UG/1; UG/1
1 POWDER RESPIRATORY (INHALATION) DAILY
Qty: 60 EACH | Refills: 1 | Status: SHIPPED | OUTPATIENT
Start: 2021-05-28 | End: 2021-07-19

## 2021-05-28 RX ORDER — MONTELUKAST SODIUM 10 MG/1
10 TABLET ORAL NIGHTLY
Qty: 90 TABLET | Refills: 3 | Status: SHIPPED | OUTPATIENT
Start: 2021-05-28 | End: 2022-06-15 | Stop reason: SDUPTHER

## 2021-06-07 RX ORDER — METRONIDAZOLE 10 MG/G
GEL TOPICAL
Qty: 60 G | Refills: 4 | OUTPATIENT
Start: 2021-06-07

## 2021-06-14 RX ORDER — METRONIDAZOLE 10 MG/G
GEL TOPICAL
Qty: 60 G | Refills: 4 | Status: SHIPPED | OUTPATIENT
Start: 2021-06-14 | End: 2021-07-19 | Stop reason: SDUPTHER

## 2021-06-14 RX ORDER — METRONIDAZOLE 10 MG/G
GEL TOPICAL
Qty: 60 G | Refills: 3 | Status: SHIPPED | OUTPATIENT
Start: 2021-06-14

## 2021-06-29 DIAGNOSIS — G47.33 OBSTRUCTIVE SLEEP APNEA (ADULT) (PEDIATRIC): Primary | ICD-10-CM

## 2021-07-01 DIAGNOSIS — E03.8 SUBCLINICAL HYPOTHYROIDISM: ICD-10-CM

## 2021-07-01 RX ORDER — LEVOTHYROXINE SODIUM 75 UG/1
TABLET ORAL
Qty: 90 TABLET | Refills: 0 | Status: SHIPPED | OUTPATIENT
Start: 2021-07-01 | End: 2021-10-25

## 2021-07-01 NOTE — TELEPHONE ENCOUNTER
Routing refill request to provider for review/approval because:  Labs out of range:  TSH  TSH   Date Value Ref Range Status   03/17/2021 4.90 (H) 0.40 - 4.00 mU/L Final     Last Written Prescription Date:  3/22/21  Last Fill Quantity: 90,  # refills: 0   Last office visit: 9/28/2020 with prescribing provider:     Future Office Visit:      CRISTO XiongN, RN

## 2021-07-01 NOTE — TELEPHONE ENCOUNTER
Refill approved. Patient due for lab recheck - orders in place.     Pilar Ruff PA-C  Covering for Zelda Garcia

## 2021-07-09 ENCOUNTER — E-ADVICE (OUTPATIENT)
Dept: FAMILY MEDICINE | Age: 66
End: 2021-07-09

## 2021-07-12 ENCOUNTER — E-ADVICE (OUTPATIENT)
Dept: FAMILY MEDICINE | Age: 66
End: 2021-07-12

## 2021-07-12 ASSESSMENT — PATIENT HEALTH QUESTIONNAIRE - PHQ9
CLINICAL INTERPRETATION OF PHQ2 SCORE: 0
1. LITTLE INTEREST OR PLEASURE IN DOING THINGS: NOT AT ALL
CLINICAL INTERPRETATION OF PHQ2 SCORE: NO FURTHER SCREENING NEEDED
2. FEELING DOWN, DEPRESSED OR HOPELESS: NOT AT ALL

## 2021-07-14 ENCOUNTER — TRANSFERRED RECORDS (OUTPATIENT)
Dept: HEALTH INFORMATION MANAGEMENT | Facility: CLINIC | Age: 66
End: 2021-07-14

## 2021-07-19 ENCOUNTER — OFFICE VISIT (OUTPATIENT)
Dept: FAMILY MEDICINE | Age: 66
End: 2021-07-19

## 2021-07-19 ENCOUNTER — LAB SERVICES (OUTPATIENT)
Dept: LAB | Age: 66
End: 2021-07-19

## 2021-07-19 VITALS
RESPIRATION RATE: 16 BRPM | HEIGHT: 66 IN | WEIGHT: 217 LBS | DIASTOLIC BLOOD PRESSURE: 84 MMHG | BODY MASS INDEX: 34.87 KG/M2 | TEMPERATURE: 97.9 F | HEART RATE: 72 BPM | SYSTOLIC BLOOD PRESSURE: 146 MMHG

## 2021-07-19 DIAGNOSIS — E03.8 OTHER SPECIFIED HYPOTHYROIDISM: ICD-10-CM

## 2021-07-19 DIAGNOSIS — L67.8 DRY HAIR: ICD-10-CM

## 2021-07-19 DIAGNOSIS — M17.11 ARTHRITIS OF RIGHT KNEE: ICD-10-CM

## 2021-07-19 DIAGNOSIS — R74.01 ELEVATED ALT MEASUREMENT: ICD-10-CM

## 2021-07-19 DIAGNOSIS — Z00.00 MEDICARE ANNUAL WELLNESS VISIT, SUBSEQUENT: Primary | ICD-10-CM

## 2021-07-19 DIAGNOSIS — I10 ESSENTIAL HYPERTENSION: ICD-10-CM

## 2021-07-19 DIAGNOSIS — M25.572 ACUTE LEFT ANKLE PAIN: ICD-10-CM

## 2021-07-19 DIAGNOSIS — E66.01 CLASS 2 SEVERE OBESITY DUE TO EXCESS CALORIES WITH SERIOUS COMORBIDITY AND BODY MASS INDEX (BMI) OF 35.0 TO 35.9 IN ADULT (CMD): ICD-10-CM

## 2021-07-19 LAB
ALBUMIN SERPL-MCNC: 4.3 G/DL (ref 3.6–5.1)
ALP SERPL-CCNC: 93 U/L (ref 45–130)
ALT SERPL W/O P-5'-P-CCNC: 48 U/L (ref 4–38)
AST SERPL-CCNC: 42 U/L (ref 14–43)
BILIRUB SERPL-MCNC: 1 MG/DL (ref 0–1.3)
BUN SERPL-MCNC: 24 MG/DL (ref 7–20)
CALCIUM SERPL-MCNC: 10.6 MG/DL (ref 8.6–10.6)
CHLORIDE SERPL-SCNC: 104 MMOL/L (ref 96–107)
CO2 SERPL-SCNC: 28 MMOL/L (ref 22–32)
CREAT SERPL-MCNC: 0.7 MG/DL (ref 0.5–1.4)
GFR SERPL CREATININE-BSD FRML MDRD: >60 ML/MIN/{1.73M2}
GFR SERPL CREATININE-BSD FRML MDRD: >60 ML/MIN/{1.73M2}
GLUCOSE SERPL-MCNC: 94 MG/DL (ref 70–200)
POTASSIUM SERPL-SCNC: 3.9 MMOL/L (ref 3.5–5.3)
PROT SERPL-MCNC: 7.3 G/DL (ref 6.4–8.5)
SODIUM SERPL-SCNC: 141 MMOL/L (ref 136–146)
TSH SERPL DL<=0.05 MIU/L-ACNC: 0.74 M[IU]/L (ref 0.3–4.82)

## 2021-07-19 PROCEDURE — 99213 OFFICE O/P EST LOW 20 MIN: CPT | Performed by: FAMILY MEDICINE

## 2021-07-19 PROCEDURE — 84443 ASSAY THYROID STIM HORMONE: CPT | Performed by: FAMILY MEDICINE

## 2021-07-19 PROCEDURE — G0439 PPPS, SUBSEQ VISIT: HCPCS | Performed by: FAMILY MEDICINE

## 2021-07-19 PROCEDURE — 80053 COMPREHEN METABOLIC PANEL: CPT | Performed by: FAMILY MEDICINE

## 2021-07-19 PROCEDURE — 36415 COLL VENOUS BLD VENIPUNCTURE: CPT | Performed by: FAMILY MEDICINE

## 2021-07-19 ASSESSMENT — MINI COG
PATIENT ABLE TO REPEAT THE 3 WORDS GIVEN PREVIOUSLY?: WAS ABLE TO REPEAT BACK 3 WORDS CORRECTLY
PATIENT WAS GIVEN REPEAT BACK WORDS FROM VERSION: 3 - VILLAGE KITCHEN BABY
TOTAL SCORE: 5
PATIENT ABLE TO FILL IN THE CLOCK FACE WITH 10 MINUTES PAST 11 O'CLOCK?: YES, CLOCK IS CORRECT

## 2021-07-19 ASSESSMENT — ACTIVITIES OF DAILY LIVING (ADL)
NEEDS_ASSIST: NO
ADL_SHORT_OF_BREATH: NO
SENSORY_SUPPORT_DEVICES: EYEGLASSES
ADL_SCORE: 12
RECENT_DECLINE_ADL: NO
ADL_BEFORE_ADMISSION: INDEPENDENT

## 2021-07-19 ASSESSMENT — COGNITIVE AND FUNCTIONAL STATUS - GENERAL
BECAUSE OF A PHYSICAL, MENTAL, OR EMOTIONAL CONDITION, DO YOU HAVE DIFFICULTY DOING ERRANDS ALONE: NO
BECAUSE OF A PHYSICAL, MENTAL, OR EMOTIONAL CONDITION, DO YOU HAVE SERIOUS DIFFICULTY CONCENTRATING, REMEMBERING OR MAKING DECISIONS: NO
DO YOU HAVE SERIOUS DIFFICULTY WALKING OR CLIMBING STAIRS: NO
ARE YOU BLIND OR DO YOU HAVE SERIOUS DIFFICULTY SEEING, EVEN WHEN WEARING GLASSES: NO
ARE YOU DEAF OR DO YOU HAVE SERIOUS DIFFICULTY  HEARING: NO
DO YOU HAVE DIFFICULTY DRESSING OR BATHING: NO

## 2021-07-19 ASSESSMENT — ENCOUNTER SYMPTOMS: ROS SKIN COMMENTS: DRY SKIN

## 2021-07-19 ASSESSMENT — PATIENT HEALTH QUESTIONNAIRE - PHQ9
2. FEELING DOWN, DEPRESSED OR HOPELESS: NOT AT ALL
1. LITTLE INTEREST OR PLEASURE IN DOING THINGS: NOT AT ALL
CLINICAL INTERPRETATION OF PHQ2 SCORE: NO FURTHER SCREENING NEEDED
SUM OF ALL RESPONSES TO PHQ9 QUESTIONS 1 AND 2: 0
SUM OF ALL RESPONSES TO PHQ9 QUESTIONS 1 AND 2: 0
CLINICAL INTERPRETATION OF PHQ9 SCORE: NO FURTHER SCREENING NEEDED

## 2021-07-20 ENCOUNTER — E-ADVICE (OUTPATIENT)
Dept: FAMILY MEDICINE | Age: 66
End: 2021-07-20

## 2021-07-20 DIAGNOSIS — R74.01 ELEVATED ALT MEASUREMENT: Primary | ICD-10-CM

## 2021-07-20 DIAGNOSIS — E03.8 OTHER SPECIFIED HYPOTHYROIDISM: ICD-10-CM

## 2021-07-20 DIAGNOSIS — E66.01 CLASS 2 SEVERE OBESITY DUE TO EXCESS CALORIES WITH SERIOUS COMORBIDITY AND BODY MASS INDEX (BMI) OF 35.0 TO 35.9 IN ADULT (CMD): ICD-10-CM

## 2021-07-20 DIAGNOSIS — I10 ESSENTIAL HYPERTENSION: ICD-10-CM

## 2021-07-27 DIAGNOSIS — J45.30 MILD PERSISTENT ALLERGIC ASTHMA: ICD-10-CM

## 2021-07-27 DIAGNOSIS — J31.0 MIXED RHINITIS: ICD-10-CM

## 2021-07-27 RX ORDER — MONTELUKAST SODIUM 10 MG/1
10 TABLET ORAL NIGHTLY
Qty: 90 TABLET | Refills: 3 | Status: CANCELLED | OUTPATIENT
Start: 2021-07-27

## 2021-08-01 DIAGNOSIS — E78.5 DYSLIPIDEMIA: ICD-10-CM

## 2021-08-03 RX ORDER — ATORVASTATIN CALCIUM 40 MG/1
TABLET, FILM COATED ORAL
Qty: 90 TABLET | Refills: 1 | Status: SHIPPED | OUTPATIENT
Start: 2021-08-03 | End: 2022-01-28

## 2021-08-15 DIAGNOSIS — F41.1 GAD (GENERALIZED ANXIETY DISORDER): ICD-10-CM

## 2021-08-16 RX ORDER — BUSPIRONE HYDROCHLORIDE 7.5 MG/1
TABLET ORAL
Qty: 90 TABLET | Refills: 0 | Status: SHIPPED | OUTPATIENT
Start: 2021-08-16 | End: 2021-11-16

## 2021-08-24 ENCOUNTER — E-ADVICE (OUTPATIENT)
Dept: FAMILY MEDICINE | Age: 66
End: 2021-08-24

## 2021-08-31 ENCOUNTER — LAB SERVICES (OUTPATIENT)
Dept: LAB | Age: 66
End: 2021-08-31

## 2021-08-31 DIAGNOSIS — E03.8 OTHER SPECIFIED HYPOTHYROIDISM: ICD-10-CM

## 2021-08-31 DIAGNOSIS — R74.01 ELEVATED ALT MEASUREMENT: ICD-10-CM

## 2021-08-31 DIAGNOSIS — E66.01 CLASS 2 SEVERE OBESITY DUE TO EXCESS CALORIES WITH SERIOUS COMORBIDITY AND BODY MASS INDEX (BMI) OF 35.0 TO 35.9 IN ADULT (CMD): ICD-10-CM

## 2021-08-31 DIAGNOSIS — I10 ESSENTIAL HYPERTENSION: ICD-10-CM

## 2021-08-31 LAB
CHOLEST SERPL-MCNC: 168 MG/DL (ref 140–200)
HDLC SERPL-MCNC: 61 MG/DL
LDLC SERPL CALC-MCNC: 95 MG/DL (ref 30–100)
TRIGL SERPL-MCNC: 59 MG/DL (ref 0–200)

## 2021-08-31 PROCEDURE — 80061 LIPID PANEL: CPT | Performed by: FAMILY MEDICINE

## 2021-08-31 PROCEDURE — 36415 COLL VENOUS BLD VENIPUNCTURE: CPT | Performed by: FAMILY MEDICINE

## 2021-09-10 DIAGNOSIS — Z11.59 ENCOUNTER FOR SCREENING FOR OTHER VIRAL DISEASES: ICD-10-CM

## 2021-09-16 ENCOUNTER — IMAGING SERVICES (OUTPATIENT)
Dept: MAMMOGRAPHY | Age: 66
End: 2021-09-16
Attending: FAMILY MEDICINE

## 2021-09-16 DIAGNOSIS — Z12.31 VISIT FOR SCREENING MAMMOGRAM: ICD-10-CM

## 2021-09-16 PROCEDURE — 77063 BREAST TOMOSYNTHESIS BI: CPT | Performed by: RADIOLOGY

## 2021-09-16 PROCEDURE — 77067 SCR MAMMO BI INCL CAD: CPT | Performed by: RADIOLOGY

## 2021-10-04 ENCOUNTER — LAB (OUTPATIENT)
Dept: LAB | Facility: CLINIC | Age: 66
End: 2021-10-04
Attending: OPHTHALMOLOGY
Payer: COMMERCIAL

## 2021-10-04 DIAGNOSIS — Z11.59 ENCOUNTER FOR SCREENING FOR OTHER VIRAL DISEASES: ICD-10-CM

## 2021-10-04 PROCEDURE — U0005 INFEC AGEN DETEC AMPLI PROBE: HCPCS

## 2021-10-04 PROCEDURE — U0003 INFECTIOUS AGENT DETECTION BY NUCLEIC ACID (DNA OR RNA); SEVERE ACUTE RESPIRATORY SYNDROME CORONAVIRUS 2 (SARS-COV-2) (CORONAVIRUS DISEASE [COVID-19]), AMPLIFIED PROBE TECHNIQUE, MAKING USE OF HIGH THROUGHPUT TECHNOLOGIES AS DESCRIBED BY CMS-2020-01-R: HCPCS

## 2021-10-05 LAB — SARS-COV-2 RNA RESP QL NAA+PROBE: NEGATIVE

## 2021-10-06 ENCOUNTER — MYC MEDICAL ADVICE (OUTPATIENT)
Dept: SLEEP MEDICINE | Facility: CLINIC | Age: 66
End: 2021-10-06

## 2021-10-06 ENCOUNTER — OFFICE VISIT (OUTPATIENT)
Dept: FAMILY MEDICINE | Facility: CLINIC | Age: 66
End: 2021-10-06
Payer: COMMERCIAL

## 2021-10-06 VITALS
BODY MASS INDEX: 44.12 KG/M2 | OXYGEN SATURATION: 97 % | DIASTOLIC BLOOD PRESSURE: 80 MMHG | RESPIRATION RATE: 16 BRPM | SYSTOLIC BLOOD PRESSURE: 124 MMHG | TEMPERATURE: 98.5 F | WEIGHT: 293 LBS | HEART RATE: 100 BPM

## 2021-10-06 DIAGNOSIS — H25.9 SENILE CATARACT OF LEFT EYE, UNSPECIFIED AGE-RELATED CATARACT TYPE: ICD-10-CM

## 2021-10-06 DIAGNOSIS — G47.33 OSA (OBSTRUCTIVE SLEEP APNEA): Primary | ICD-10-CM

## 2021-10-06 DIAGNOSIS — M17.11 ARTHRITIS OF RIGHT KNEE: ICD-10-CM

## 2021-10-06 DIAGNOSIS — Z01.818 PREOP GENERAL PHYSICAL EXAM: ICD-10-CM

## 2021-10-06 DIAGNOSIS — K21.9 GASTROESOPHAGEAL REFLUX DISEASE WITHOUT ESOPHAGITIS: ICD-10-CM

## 2021-10-06 DIAGNOSIS — E66.01 MORBID OBESITY (H): ICD-10-CM

## 2021-10-06 PROCEDURE — 99214 OFFICE O/P EST MOD 30 MIN: CPT | Performed by: NURSE PRACTITIONER

## 2021-10-06 PROCEDURE — 93000 ELECTROCARDIOGRAM COMPLETE: CPT | Performed by: NURSE PRACTITIONER

## 2021-10-06 ASSESSMENT — PAIN SCALES - GENERAL: PAINLEVEL: MILD PAIN (3)

## 2021-10-06 NOTE — PATIENT INSTRUCTIONS

## 2021-10-06 NOTE — H&P (VIEW-ONLY)
[address] [address]  Phone: [phone]  Fax: [phone]  Primary Provider: Zelda Garcia  Pre-op Performing Provider: CAREN COATS      PREOPERATIVE EVALUATION:  Today's date: 10/6/2021    Ashli Rogers is a 66 year old female who presents for a preoperative evaluation.    Surgical Information:  Surgery/Procedure: Cataract Surgery  Surgery Location: [facility]   Surgeon: Dennis Guy  Surgery Date: 10/7/2021 and 10/21/2021  Time of Surgery: TBD  Where patient plans to recover: At home with family  Fax number for surgical facility: Note does not need to be faxed, will be available electronically in Epic.    Type of Anesthesia Anticipated: to be determined    Assessment & Plan     The proposed surgical procedure is considered LOW risk.    ZELDA (obstructive sleep apnea)  Stable patient will bring CPAP with her day of surgeries.    Gastroesophageal reflux disease without esophagitis  Stable currently taking Prilosec will continue.    Morbid obesity (H)  We did discuss increased activity and lifestyle changes pre and post surgeries.    Preop general physical exam  Recommend for total knee surgery type and screen and CBC as well as EKG for both.  Patient states she will check with surgery department in regards to CBC and type and screen not sure if she will have this done through [facility] or [facility].  She will hold all supplements and NSAIDs as well as aspirin prior to surgery.  - CBC with platelets; Future  - ABO/Rh type and screen; Future  - EKG 12-lead complete w/read - Clinics    Senile cataract of left eye, unspecified age-related cataract type    - EKG 12-lead complete w/read - Clinics    Arthritis of right knee    - CBC with platelets; Future  - ABO/Rh type and screen; Future  - EKG 12-lead complete w/read - Clinics         Risks and Recommendations:  The patient has the following additional risks and  recommendations for perioperative complications:   - No identified additional risk factors other than previously addressed    Medication Instructions:  Patient is to take all scheduled medications on the day of surgery EXCEPT for modifications listed below:  Supplements NSAIDs.    RECOMMENDATION:  APPROVAL GIVEN to proceed with proposed procedure, without further diagnostic evaluation.        Subjective     HPI related to upcoming procedure: Patient with history of osteoarthritis affecting right knee.  She has had previous knee replacement left.  She also has history of cataracts that are seen now in etiology.    Preop Questions 10/6/2021   1. Have you ever had a heart attack or stroke? No   2. Have you ever had surgery on your heart or blood vessels, such as a stent placement, a coronary artery bypass, or surgery on an artery in your head, neck, heart, or legs? No   3. Do you have chest pain with activity? No   4. Do you have a history of  heart failure? No   5. Do you currently have a cold, bronchitis or symptoms of other infection? No   6. Do you have a cough, shortness of breath, or wheezing? YES -intermittent not related to current cold usually related to seasonal allergies   7. Do you or anyone in your family have previous history of blood clots? No   8. Do you or does anyone in your family have a serious bleeding problem such as prolonged bleeding following surgeries or cuts? No   9. Have you ever had problems with anemia or been told to take iron pills? No   10. Have you had any abnormal blood loss such as black, tarry or bloody stools, or abnormal vaginal bleeding? No   11. Have you ever had a blood transfusion? No   12. Are you willing to have a blood transfusion if it is medically needed before, during, or after your surgery? Yes   13. Have you or any of your relatives ever had problems with anesthesia? No   14. Do you have sleep apnea, excessive snoring or daytime drowsiness? YES -she will bring her CPAP    14a. Do you have a CPAP machine? Yes   15. Do you have any artifical heart valves or other implanted medical devices like a pacemaker, defibrillator, or continuous glucose monitor? No   16. Do you have artificial joints? YES -she has had left knee and hip surgery in past   17. Are you allergic to latex? No   18. Is there any chance that you may be pregnant? -       Health Care Directive:  Patient does not have a Health Care Directive or Living Will: Advance Directive received and scanned. Click on Code in the patient header to view.    Preoperative Review of :   reviewed - no record of controlled substances prescribed.      Status of Chronic Conditions:  See problem list for active medical problems.  Problems all longstanding and stable, except as noted/documented.  See ROS for pertinent symptoms related to these conditions.      Review of Systems  Constitutional, neuro, ENT, endocrine, pulmonary, cardiac, gastrointestinal, genitourinary, musculoskeletal, integument and psychiatric systems are negative, except as otherwise noted.    Patient Active Problem List    Diagnosis Date Noted     ZELDA (obstructive sleep apnea) 08/01/2020     Priority: Medium     Hyperlipidemia LDL goal <130 08/01/2020     Priority: Medium     Bunion, left 08/01/2019     Priority: Medium     Pain due to varicose veins of both lower extremities 08/01/2019     Priority: Medium     Diverticulitis of colon 02/03/2017     Priority: Medium     Morbid obesity (H) 07/05/2016     Priority: Medium     Hiatal hernia 07/05/2016     Priority: Medium     Gastroesophageal reflux disease, esophagitis presence not specified 03/18/2016     Priority: Medium     IMO Regulatory Load OCT 2020       Arthritis of knee, left 08/13/2013     Priority: Medium     ADD (attention deficit disorder) 03/03/2013     Priority: Medium     On deepa and is being followed by a mental health specialist        Advanced directives, counseling/discussion 10/28/2011      Priority: Medium     Pt is going to take home the packet and is going to look at it.  MP/MA       DJD (degenerative joint disease) of knee 05/24/2011     Priority: Medium     Allergic rhinitis 11/13/2006     Priority: Medium     Problem list name updated by automated process. Provider to review       Varicose veins of lower extremities with complications 09/28/2005     Priority: Medium     Problem list name updated by automated process. Provider to review       S/P total hip arthroplasty 04/04/2003     Priority: Medium      Past Medical History:   Diagnosis Date     Cataracts, bilateral      Closed fracture of medial malleolus     Distal avulsion fracture of the medial malleoli     Diverticulitis of colon 2/3/2017     DJD (degenerative joint disease) 05/24/2011    right knee, s/p injections     Erythema nodosum      Generalized osteoarthrosis, unspecified site     Hips     S/P total hip arthroplasty 04/04/2003    right     S/P total knee arthroplasty     left     Sleep apnea     on CPAP     Past Surgical History:   Procedure Laterality Date     C TOTAL HIP ARTHROPLASTY Right 04/26/2004    Hip Replacement, Total     C TOTAL KNEE ARTHROPLASTY Left 2013     COLONOSCOPY N/A 05/11/2016    normal, repeat 10 years     ESOPHAGOSCOPY, GASTROSCOPY, DUODENOSCOPY (EGD), COMBINED N/A 05/11/2016    Procedure: COMBINED ESOPHAGOSCOPY, GASTROSCOPY, DUODENOSCOPY (EGD), BIOPSY SINGLE OR MULTIPLE;  Surgeon: Ervin Johnston MD;  Location: [facility]     FINGER SURGERY Right 04/2019    ganglion cyst removed     ZZHC COLONOSCOPY W/WO BRUSH/WASH  01/11/2006     Current Outpatient Medications   Medication Sig Dispense Refill     CALCIUM-MAGNESIUM-ZINC PO Take 1 tablet by mouth daily Reported on 3/28/2017       Cholecalciferol (VITAMIN D3 PO) Take by mouth daily       IBUPROFEN PO        levothyroxine (SYNTHROID/LEVOTHROID) 75 MCG tablet TAKE ONE TABLET BY MOUTH ONCE DAILY 90 tablet 0     NEW MED CBD oil that rolls on her knee       Omega-3  Fatty Acids (FISH OIL PO)        omeprazole (PRILOSEC OTC) 20 MG EC tablet Take 1 tablet (20 mg) by mouth daily 90 tablet 1     simvastatin (ZOCOR) 10 MG tablet Take 1 tablet (10 mg) by mouth At Bedtime 90 tablet 3     ZYRTEC 10 MG OR TABS ONE TABLET DAILY 30 8       Allergies   Allergen Reactions     Cephalexin Monohydrate      keflex     Gluten Meal GI Disturbance     Warfarin Sodium      coumadin        Social History     Tobacco Use     Smoking status: Never Smoker     Smokeless tobacco: Never Used   Substance Use Topics     Alcohol use: Yes     Comment: 2 drinks per month     Family History   Problem Relation Age of Onset     Cancer Father          of lung cancer     Heart Disease Daughter         2 holes in her heart     History   Drug Use No         Objective     /80   Pulse 100   Temp 98.5  F (36.9  C) (Temporal)   Resp 16   Wt 145.3 kg (320 lb 6.4 oz)   LMP 2005   SpO2 97%   Breastfeeding No   BMI 44.12 kg/m      Physical Exam    GENERAL APPEARANCE: healthy, alert and no distress     EYES: EOMI, PERRL     HENT: ear canals and TM's normal and nose and mouth without ulcers or lesions     NECK: no adenopathy, no asymmetry, masses, or scars and thyroid normal to palpation     RESP: lungs clear to auscultation - no rales, rhonchi or wheezes     CV: regular rates and rhythm, normal S1 S2, no S3 or S4 and no murmur, click or rub     ABDOMEN:  soft, nontender, no HSM or masses and bowel sounds normal     MS: extremities normal- no gross deformities noted, no evidence of inflammation in joints, FROM in all extremities.     SKIN: no suspicious lesions or rashes     NEURO: Normal strength and tone, sensory exam grossly normal, mentation intact and speech normal     PSYCH: mentation appears normal. and affect normal/bright     LYMPHATICS: No cervical adenopathy    Recent Labs   Lab Test 20  1503      POTASSIUM 4.0   CR 0.86        Diagnostics:  Labs pending at this time.  Results  will be reviewed when available.   EKG: appears normal, NSR, sinus bradycardia, normal axis, normal intervals, no acute ST/T changes c/w ischemia, no LVH by voltage criteria, unchanged from previous tracings    Revised Cardiac Risk Index (RCRI):  The patient has the following serious cardiovascular risks for perioperative complications:   - No serious cardiac risks = 0 points     RCRI Interpretation: 0 points: Class I (very low risk - 0.4% complication rate)           Signed Electronically by: ADRIENNE Greco CNP  Copy of this evaluation report is provided to requesting physician.

## 2021-10-06 NOTE — PROGRESS NOTES
[address] [address]  Phone: [phone]  Fax: [phone]  Primary Provider: Zelda Garcia  Pre-op Performing Provider: CAREN COATS      PREOPERATIVE EVALUATION:  Today's date: 10/6/2021    Ashli Rogers is a 66 year old female who presents for a preoperative evaluation.    Surgical Information:  Surgery/Procedure: Cataract Surgery  Surgery Location: [facility]   Surgeon: Dennis Guy  Surgery Date: 10/7/2021 and 10/21/2021  Time of Surgery: TBD  Where patient plans to recover: At home with family  Fax number for surgical facility: Note does not need to be faxed, will be available electronically in Epic.    Type of Anesthesia Anticipated: to be determined    Assessment & Plan     The proposed surgical procedure is considered LOW risk.    ZELDA (obstructive sleep apnea)  Stable patient will bring CPAP with her day of surgeries.    Gastroesophageal reflux disease without esophagitis  Stable currently taking Prilosec will continue.    Morbid obesity (H)  We did discuss increased activity and lifestyle changes pre and post surgeries.    Preop general physical exam  Recommend for total knee surgery type and screen and CBC as well as EKG for both.  Patient states she will check with surgery department in regards to CBC and type and screen not sure if she will have this done through [facility] or [facility].  She will hold all supplements and NSAIDs as well as aspirin prior to surgery.  - CBC with platelets; Future  - ABO/Rh type and screen; Future  - EKG 12-lead complete w/read - Clinics    Senile cataract of left eye, unspecified age-related cataract type    - EKG 12-lead complete w/read - Clinics    Arthritis of right knee    - CBC with platelets; Future  - ABO/Rh type and screen; Future  - EKG 12-lead complete w/read - Clinics         Risks and Recommendations:  The patient has the following additional risks and  recommendations for perioperative complications:   - No identified additional risk factors other than previously addressed    Medication Instructions:  Patient is to take all scheduled medications on the day of surgery EXCEPT for modifications listed below:  Supplements NSAIDs.    RECOMMENDATION:  APPROVAL GIVEN to proceed with proposed procedure, without further diagnostic evaluation.        Subjective     HPI related to upcoming procedure: Patient with history of osteoarthritis affecting right knee.  She has had previous knee replacement left.  She also has history of cataracts that are seen now in etiology.    Preop Questions 10/6/2021   1. Have you ever had a heart attack or stroke? No   2. Have you ever had surgery on your heart or blood vessels, such as a stent placement, a coronary artery bypass, or surgery on an artery in your head, neck, heart, or legs? No   3. Do you have chest pain with activity? No   4. Do you have a history of  heart failure? No   5. Do you currently have a cold, bronchitis or symptoms of other infection? No   6. Do you have a cough, shortness of breath, or wheezing? YES -intermittent not related to current cold usually related to seasonal allergies   7. Do you or anyone in your family have previous history of blood clots? No   8. Do you or does anyone in your family have a serious bleeding problem such as prolonged bleeding following surgeries or cuts? No   9. Have you ever had problems with anemia or been told to take iron pills? No   10. Have you had any abnormal blood loss such as black, tarry or bloody stools, or abnormal vaginal bleeding? No   11. Have you ever had a blood transfusion? No   12. Are you willing to have a blood transfusion if it is medically needed before, during, or after your surgery? Yes   13. Have you or any of your relatives ever had problems with anesthesia? No   14. Do you have sleep apnea, excessive snoring or daytime drowsiness? YES -she will bring her CPAP    14a. Do you have a CPAP machine? Yes   15. Do you have any artifical heart valves or other implanted medical devices like a pacemaker, defibrillator, or continuous glucose monitor? No   16. Do you have artificial joints? YES -she has had left knee and hip surgery in past   17. Are you allergic to latex? No   18. Is there any chance that you may be pregnant? -       Health Care Directive:  Patient does not have a Health Care Directive or Living Will: Advance Directive received and scanned. Click on Code in the patient header to view.    Preoperative Review of :   reviewed - no record of controlled substances prescribed.      Status of Chronic Conditions:  See problem list for active medical problems.  Problems all longstanding and stable, except as noted/documented.  See ROS for pertinent symptoms related to these conditions.      Review of Systems  Constitutional, neuro, ENT, endocrine, pulmonary, cardiac, gastrointestinal, genitourinary, musculoskeletal, integument and psychiatric systems are negative, except as otherwise noted.    Patient Active Problem List    Diagnosis Date Noted     ZELDA (obstructive sleep apnea) 08/01/2020     Priority: Medium     Hyperlipidemia LDL goal <130 08/01/2020     Priority: Medium     Bunion, left 08/01/2019     Priority: Medium     Pain due to varicose veins of both lower extremities 08/01/2019     Priority: Medium     Diverticulitis of colon 02/03/2017     Priority: Medium     Morbid obesity (H) 07/05/2016     Priority: Medium     Hiatal hernia 07/05/2016     Priority: Medium     Gastroesophageal reflux disease, esophagitis presence not specified 03/18/2016     Priority: Medium     IMO Regulatory Load OCT 2020       Arthritis of knee, left 08/13/2013     Priority: Medium     ADD (attention deficit disorder) 03/03/2013     Priority: Medium     On deepa and is being followed by a mental health specialist        Advanced directives, counseling/discussion 10/28/2011      Priority: Medium     Pt is going to take home the packet and is going to look at it.  MP/MA       DJD (degenerative joint disease) of knee 05/24/2011     Priority: Medium     Allergic rhinitis 11/13/2006     Priority: Medium     Problem list name updated by automated process. Provider to review       Varicose veins of lower extremities with complications 09/28/2005     Priority: Medium     Problem list name updated by automated process. Provider to review       S/P total hip arthroplasty 04/04/2003     Priority: Medium      Past Medical History:   Diagnosis Date     Cataracts, bilateral      Closed fracture of medial malleolus     Distal avulsion fracture of the medial malleoli     Diverticulitis of colon 2/3/2017     DJD (degenerative joint disease) 05/24/2011    right knee, s/p injections     Erythema nodosum      Generalized osteoarthrosis, unspecified site     Hips     S/P total hip arthroplasty 04/04/2003    right     S/P total knee arthroplasty     left     Sleep apnea     on CPAP     Past Surgical History:   Procedure Laterality Date     C TOTAL HIP ARTHROPLASTY Right 04/26/2004    Hip Replacement, Total     C TOTAL KNEE ARTHROPLASTY Left 2013     COLONOSCOPY N/A 05/11/2016    normal, repeat 10 years     ESOPHAGOSCOPY, GASTROSCOPY, DUODENOSCOPY (EGD), COMBINED N/A 05/11/2016    Procedure: COMBINED ESOPHAGOSCOPY, GASTROSCOPY, DUODENOSCOPY (EGD), BIOPSY SINGLE OR MULTIPLE;  Surgeon: Ervin Johnston MD;  Location: [facility]     FINGER SURGERY Right 04/2019    ganglion cyst removed     ZZHC COLONOSCOPY W/WO BRUSH/WASH  01/11/2006     Current Outpatient Medications   Medication Sig Dispense Refill     CALCIUM-MAGNESIUM-ZINC PO Take 1 tablet by mouth daily Reported on 3/28/2017       Cholecalciferol (VITAMIN D3 PO) Take by mouth daily       IBUPROFEN PO        levothyroxine (SYNTHROID/LEVOTHROID) 75 MCG tablet TAKE ONE TABLET BY MOUTH ONCE DAILY 90 tablet 0     NEW MED CBD oil that rolls on her knee       Omega-3  Fatty Acids (FISH OIL PO)        omeprazole (PRILOSEC OTC) 20 MG EC tablet Take 1 tablet (20 mg) by mouth daily 90 tablet 1     simvastatin (ZOCOR) 10 MG tablet Take 1 tablet (10 mg) by mouth At Bedtime 90 tablet 3     ZYRTEC 10 MG OR TABS ONE TABLET DAILY 30 8       Allergies   Allergen Reactions     Cephalexin Monohydrate      keflex     Gluten Meal GI Disturbance     Warfarin Sodium      coumadin        Social History     Tobacco Use     Smoking status: Never Smoker     Smokeless tobacco: Never Used   Substance Use Topics     Alcohol use: Yes     Comment: 2 drinks per month     Family History   Problem Relation Age of Onset     Cancer Father          of lung cancer     Heart Disease Daughter         2 holes in her heart     History   Drug Use No         Objective     /80   Pulse 100   Temp 98.5  F (36.9  C) (Temporal)   Resp 16   Wt 145.3 kg (320 lb 6.4 oz)   LMP 2005   SpO2 97%   Breastfeeding No   BMI 44.12 kg/m      Physical Exam    GENERAL APPEARANCE: healthy, alert and no distress     EYES: EOMI, PERRL     HENT: ear canals and TM's normal and nose and mouth without ulcers or lesions     NECK: no adenopathy, no asymmetry, masses, or scars and thyroid normal to palpation     RESP: lungs clear to auscultation - no rales, rhonchi or wheezes     CV: regular rates and rhythm, normal S1 S2, no S3 or S4 and no murmur, click or rub     ABDOMEN:  soft, nontender, no HSM or masses and bowel sounds normal     MS: extremities normal- no gross deformities noted, no evidence of inflammation in joints, FROM in all extremities.     SKIN: no suspicious lesions or rashes     NEURO: Normal strength and tone, sensory exam grossly normal, mentation intact and speech normal     PSYCH: mentation appears normal. and affect normal/bright     LYMPHATICS: No cervical adenopathy    Recent Labs   Lab Test 20  1503      POTASSIUM 4.0   CR 0.86        Diagnostics:  Labs pending at this time.  Results  will be reviewed when available.   EKG: appears normal, NSR, sinus bradycardia, normal axis, normal intervals, no acute ST/T changes c/w ischemia, no LVH by voltage criteria, unchanged from previous tracings    Revised Cardiac Risk Index (RCRI):  The patient has the following serious cardiovascular risks for perioperative complications:   - No serious cardiac risks = 0 points     RCRI Interpretation: 0 points: Class I (very low risk - 0.4% complication rate)           Signed Electronically by: ADRIENNE Greco CNP  Copy of this evaluation report is provided to requesting physician.

## 2021-10-06 NOTE — H&P (VIEW-ONLY)
[address] [address]  Phone: [phone]  Fax: [phone]  Primary Provider: Zelda Garcia  Pre-op Performing Provider: CAREN COATS      PREOPERATIVE EVALUATION:  Today's date: 10/6/2021    Ashli Rogers is a 66 year old female who presents for a preoperative evaluation.    Surgical Information:  Surgery/Procedure: Cataract Surgery  Surgery Location: [facility]   Surgeon: Dennis Guy  Surgery Date: 10/7/2021 and 10/21/2021  Time of Surgery: TBD  Where patient plans to recover: At home with family  Fax number for surgical facility: Note does not need to be faxed, will be available electronically in Epic.    Type of Anesthesia Anticipated: to be determined    Assessment & Plan     The proposed surgical procedure is considered LOW risk.    ZELDA (obstructive sleep apnea)  Stable patient will bring CPAP with her day of surgeries.    Gastroesophageal reflux disease without esophagitis  Stable currently taking Prilosec will continue.    Morbid obesity (H)  We did discuss increased activity and lifestyle changes pre and post surgeries.    Preop general physical exam  Recommend for total knee surgery type and screen and CBC as well as EKG for both.  Patient states she will check with surgery department in regards to CBC and type and screen not sure if she will have this done through [facility] or [facility].  She will hold all supplements and NSAIDs as well as aspirin prior to surgery.  - CBC with platelets; Future  - ABO/Rh type and screen; Future  - EKG 12-lead complete w/read - Clinics    Senile cataract of left eye, unspecified age-related cataract type    - EKG 12-lead complete w/read - Clinics    Arthritis of right knee    - CBC with platelets; Future  - ABO/Rh type and screen; Future  - EKG 12-lead complete w/read - Clinics         Risks and Recommendations:  The patient has the following additional risks and  recommendations for perioperative complications:   - No identified additional risk factors other than previously addressed    Medication Instructions:  Patient is to take all scheduled medications on the day of surgery EXCEPT for modifications listed below:  Supplements NSAIDs.    RECOMMENDATION:  APPROVAL GIVEN to proceed with proposed procedure, without further diagnostic evaluation.        Subjective     HPI related to upcoming procedure: Patient with history of osteoarthritis affecting right knee.  She has had previous knee replacement left.  She also has history of cataracts that are seen now in etiology.    Preop Questions 10/6/2021   1. Have you ever had a heart attack or stroke? No   2. Have you ever had surgery on your heart or blood vessels, such as a stent placement, a coronary artery bypass, or surgery on an artery in your head, neck, heart, or legs? No   3. Do you have chest pain with activity? No   4. Do you have a history of  heart failure? No   5. Do you currently have a cold, bronchitis or symptoms of other infection? No   6. Do you have a cough, shortness of breath, or wheezing? YES -intermittent not related to current cold usually related to seasonal allergies   7. Do you or anyone in your family have previous history of blood clots? No   8. Do you or does anyone in your family have a serious bleeding problem such as prolonged bleeding following surgeries or cuts? No   9. Have you ever had problems with anemia or been told to take iron pills? No   10. Have you had any abnormal blood loss such as black, tarry or bloody stools, or abnormal vaginal bleeding? No   11. Have you ever had a blood transfusion? No   12. Are you willing to have a blood transfusion if it is medically needed before, during, or after your surgery? Yes   13. Have you or any of your relatives ever had problems with anesthesia? No   14. Do you have sleep apnea, excessive snoring or daytime drowsiness? YES -she will bring her CPAP    14a. Do you have a CPAP machine? Yes   15. Do you have any artifical heart valves or other implanted medical devices like a pacemaker, defibrillator, or continuous glucose monitor? No   16. Do you have artificial joints? YES -she has had left knee and hip surgery in past   17. Are you allergic to latex? No   18. Is there any chance that you may be pregnant? -       Health Care Directive:  Patient does not have a Health Care Directive or Living Will: Advance Directive received and scanned. Click on Code in the patient header to view.    Preoperative Review of :   reviewed - no record of controlled substances prescribed.      Status of Chronic Conditions:  See problem list for active medical problems.  Problems all longstanding and stable, except as noted/documented.  See ROS for pertinent symptoms related to these conditions.      Review of Systems  Constitutional, neuro, ENT, endocrine, pulmonary, cardiac, gastrointestinal, genitourinary, musculoskeletal, integument and psychiatric systems are negative, except as otherwise noted.    Patient Active Problem List    Diagnosis Date Noted     ZELDA (obstructive sleep apnea) 08/01/2020     Priority: Medium     Hyperlipidemia LDL goal <130 08/01/2020     Priority: Medium     Bunion, left 08/01/2019     Priority: Medium     Pain due to varicose veins of both lower extremities 08/01/2019     Priority: Medium     Diverticulitis of colon 02/03/2017     Priority: Medium     Morbid obesity (H) 07/05/2016     Priority: Medium     Hiatal hernia 07/05/2016     Priority: Medium     Gastroesophageal reflux disease, esophagitis presence not specified 03/18/2016     Priority: Medium     IMO Regulatory Load OCT 2020       Arthritis of knee, left 08/13/2013     Priority: Medium     ADD (attention deficit disorder) 03/03/2013     Priority: Medium     On deepa and is being followed by a mental health specialist        Advanced directives, counseling/discussion 10/28/2011      Priority: Medium     Pt is going to take home the packet and is going to look at it.  MP/MA       DJD (degenerative joint disease) of knee 05/24/2011     Priority: Medium     Allergic rhinitis 11/13/2006     Priority: Medium     Problem list name updated by automated process. Provider to review       Varicose veins of lower extremities with complications 09/28/2005     Priority: Medium     Problem list name updated by automated process. Provider to review       S/P total hip arthroplasty 04/04/2003     Priority: Medium      Past Medical History:   Diagnosis Date     Cataracts, bilateral      Closed fracture of medial malleolus     Distal avulsion fracture of the medial malleoli     Diverticulitis of colon 2/3/2017     DJD (degenerative joint disease) 05/24/2011    right knee, s/p injections     Erythema nodosum      Generalized osteoarthrosis, unspecified site     Hips     S/P total hip arthroplasty 04/04/2003    right     S/P total knee arthroplasty     left     Sleep apnea     on CPAP     Past Surgical History:   Procedure Laterality Date     C TOTAL HIP ARTHROPLASTY Right 04/26/2004    Hip Replacement, Total     C TOTAL KNEE ARTHROPLASTY Left 2013     COLONOSCOPY N/A 05/11/2016    normal, repeat 10 years     ESOPHAGOSCOPY, GASTROSCOPY, DUODENOSCOPY (EGD), COMBINED N/A 05/11/2016    Procedure: COMBINED ESOPHAGOSCOPY, GASTROSCOPY, DUODENOSCOPY (EGD), BIOPSY SINGLE OR MULTIPLE;  Surgeon: Ervin Johnston MD;  Location: [facility]     FINGER SURGERY Right 04/2019    ganglion cyst removed     ZZHC COLONOSCOPY W/WO BRUSH/WASH  01/11/2006     Current Outpatient Medications   Medication Sig Dispense Refill     CALCIUM-MAGNESIUM-ZINC PO Take 1 tablet by mouth daily Reported on 3/28/2017       Cholecalciferol (VITAMIN D3 PO) Take by mouth daily       IBUPROFEN PO        levothyroxine (SYNTHROID/LEVOTHROID) 75 MCG tablet TAKE ONE TABLET BY MOUTH ONCE DAILY 90 tablet 0     NEW MED CBD oil that rolls on her knee       Omega-3  Fatty Acids (FISH OIL PO)        omeprazole (PRILOSEC OTC) 20 MG EC tablet Take 1 tablet (20 mg) by mouth daily 90 tablet 1     simvastatin (ZOCOR) 10 MG tablet Take 1 tablet (10 mg) by mouth At Bedtime 90 tablet 3     ZYRTEC 10 MG OR TABS ONE TABLET DAILY 30 8       Allergies   Allergen Reactions     Cephalexin Monohydrate      keflex     Gluten Meal GI Disturbance     Warfarin Sodium      coumadin        Social History     Tobacco Use     Smoking status: Never Smoker     Smokeless tobacco: Never Used   Substance Use Topics     Alcohol use: Yes     Comment: 2 drinks per month     Family History   Problem Relation Age of Onset     Cancer Father          of lung cancer     Heart Disease Daughter         2 holes in her heart     History   Drug Use No         Objective     /80   Pulse 100   Temp 98.5  F (36.9  C) (Temporal)   Resp 16   Wt 145.3 kg (320 lb 6.4 oz)   LMP 2005   SpO2 97%   Breastfeeding No   BMI 44.12 kg/m      Physical Exam    GENERAL APPEARANCE: healthy, alert and no distress     EYES: EOMI, PERRL     HENT: ear canals and TM's normal and nose and mouth without ulcers or lesions     NECK: no adenopathy, no asymmetry, masses, or scars and thyroid normal to palpation     RESP: lungs clear to auscultation - no rales, rhonchi or wheezes     CV: regular rates and rhythm, normal S1 S2, no S3 or S4 and no murmur, click or rub     ABDOMEN:  soft, nontender, no HSM or masses and bowel sounds normal     MS: extremities normal- no gross deformities noted, no evidence of inflammation in joints, FROM in all extremities.     SKIN: no suspicious lesions or rashes     NEURO: Normal strength and tone, sensory exam grossly normal, mentation intact and speech normal     PSYCH: mentation appears normal. and affect normal/bright     LYMPHATICS: No cervical adenopathy    Recent Labs   Lab Test 20  1503      POTASSIUM 4.0   CR 0.86        Diagnostics:  Labs pending at this time.  Results  will be reviewed when available.   EKG: appears normal, NSR, sinus bradycardia, normal axis, normal intervals, no acute ST/T changes c/w ischemia, no LVH by voltage criteria, unchanged from previous tracings    Revised Cardiac Risk Index (RCRI):  The patient has the following serious cardiovascular risks for perioperative complications:   - No serious cardiac risks = 0 points     RCRI Interpretation: 0 points: Class I (very low risk - 0.4% complication rate)           Signed Electronically by: ADRIENNE Greco CNP  Copy of this evaluation report is provided to requesting physician.

## 2021-10-07 ENCOUNTER — HOSPITAL ENCOUNTER (OUTPATIENT)
Facility: CLINIC | Age: 66
Discharge: HOME | End: 2021-10-07
Attending: OPHTHALMOLOGY | Admitting: OPHTHALMOLOGY

## 2021-10-07 ENCOUNTER — HOSPITAL ENCOUNTER (OUTPATIENT)
Facility: CLINIC | Age: 66
Discharge: HOME | End: 2021-10-07
Attending: OPHTHALMOLOGY | Admitting: OPHTHALMOLOGY
Payer: MEDICARE

## 2021-10-07 ENCOUNTER — ANESTHESIA (OUTPATIENT)
Dept: SURGERY | Facility: CLINIC | Age: 66
End: 2021-10-07
Payer: MEDICARE

## 2021-10-07 ENCOUNTER — ANESTHESIA EVENT (OUTPATIENT)
Dept: SURGERY | Facility: CLINIC | Age: 66
End: 2021-10-07
Payer: MEDICARE

## 2021-10-07 VITALS
DIASTOLIC BLOOD PRESSURE: 78 MMHG | OXYGEN SATURATION: 96 % | SYSTOLIC BLOOD PRESSURE: 102 MMHG | RESPIRATION RATE: 16 BRPM | TEMPERATURE: 99.1 F

## 2021-10-07 PROCEDURE — 250N000009 HC RX 250: Performed by: OPHTHALMOLOGY

## 2021-10-07 PROCEDURE — 360N000007 HC CATARACT SURGICAL PACKAGE: Performed by: OPHTHALMOLOGY

## 2021-10-07 PROCEDURE — 370N000004 HC ANESTHESIA CATARACT PACKAGE: Performed by: OPHTHALMOLOGY

## 2021-10-07 PROCEDURE — V2632 POST CHMBR INTRAOCULAR LENS: HCPCS | Performed by: OPHTHALMOLOGY

## 2021-10-07 PROCEDURE — 250N000011 HC RX IP 250 OP 636: Performed by: NURSE ANESTHETIST, CERTIFIED REGISTERED

## 2021-10-07 PROCEDURE — 761N000008 HC RECOVERY CATRACT PACKAGE: Performed by: OPHTHALMOLOGY

## 2021-10-07 PROCEDURE — V2788 PRESBYOPIA-CORRECT FUNCTION: HCPCS | Performed by: OPHTHALMOLOGY

## 2021-10-07 PROCEDURE — 250N000009 HC RX 250: Performed by: NURSE ANESTHETIST, CERTIFIED REGISTERED

## 2021-10-07 PROCEDURE — 250N000011 HC RX IP 250 OP 636: Performed by: OPHTHALMOLOGY

## 2021-10-07 DEVICE — IMPLANTABLE DEVICE: Type: IMPLANTABLE DEVICE | Site: EYE | Status: FUNCTIONAL

## 2021-10-07 RX ORDER — PROPARACAINE HYDROCHLORIDE 5 MG/ML
1 SOLUTION/ DROPS OPHTHALMIC ONCE
Status: DISCONTINUED | OUTPATIENT
Start: 2021-10-07 | End: 2021-10-07 | Stop reason: HOSPADM

## 2021-10-07 RX ORDER — TROPICAMIDE 10 MG/ML
1 SOLUTION/ DROPS OPHTHALMIC
Status: COMPLETED | OUTPATIENT
Start: 2021-10-07 | End: 2021-10-07

## 2021-10-07 RX ORDER — SODIUM CHLORIDE, SODIUM LACTATE, POTASSIUM CHLORIDE, CALCIUM CHLORIDE 600; 310; 30; 20 MG/100ML; MG/100ML; MG/100ML; MG/100ML
INJECTION, SOLUTION INTRAVENOUS CONTINUOUS
Status: DISCONTINUED | OUTPATIENT
Start: 2021-10-07 | End: 2021-10-07 | Stop reason: HOSPADM

## 2021-10-07 RX ORDER — PROPARACAINE HYDROCHLORIDE 5 MG/ML
1 SOLUTION/ DROPS OPHTHALMIC ONCE
Status: COMPLETED | OUTPATIENT
Start: 2021-10-07 | End: 2021-10-07

## 2021-10-07 RX ORDER — LIDOCAINE 40 MG/G
CREAM TOPICAL
Status: DISCONTINUED | OUTPATIENT
Start: 2021-10-07 | End: 2021-10-07 | Stop reason: HOSPADM

## 2021-10-07 RX ORDER — SODIUM CHLORIDE, SODIUM LACTATE, POTASSIUM CHLORIDE, CALCIUM CHLORIDE 600; 310; 30; 20 MG/100ML; MG/100ML; MG/100ML; MG/100ML
INJECTION, SOLUTION INTRAVENOUS CONTINUOUS
Status: CANCELLED | OUTPATIENT
Start: 2021-10-07

## 2021-10-07 RX ORDER — DICLOFENAC SODIUM 1 MG/ML
1 SOLUTION/ DROPS OPHTHALMIC
Status: COMPLETED | OUTPATIENT
Start: 2021-10-07 | End: 2021-10-07

## 2021-10-07 RX ORDER — FENTANYL CITRATE 50 UG/ML
INJECTION, SOLUTION INTRAMUSCULAR; INTRAVENOUS PRN
Status: DISCONTINUED | OUTPATIENT
Start: 2021-10-07 | End: 2021-10-07

## 2021-10-07 RX ORDER — PHENYLEPHRINE HYDROCHLORIDE 25 MG/ML
1 SOLUTION/ DROPS OPHTHALMIC
Status: COMPLETED | OUTPATIENT
Start: 2021-10-07 | End: 2021-10-07

## 2021-10-07 RX ORDER — MOXIFLOXACIN 5 MG/ML
1 SOLUTION/ DROPS OPHTHALMIC
Status: COMPLETED | OUTPATIENT
Start: 2021-10-07 | End: 2021-10-07

## 2021-10-07 RX ADMIN — TROPICAMIDE 1 DROP: 10 SOLUTION/ DROPS OPHTHALMIC at 09:56

## 2021-10-07 RX ADMIN — MIDAZOLAM 1 MG: 1 INJECTION INTRAMUSCULAR; INTRAVENOUS at 11:08

## 2021-10-07 RX ADMIN — PHENYLEPHRINE HYDROCHLORIDE 1 DROP: 25 SOLUTION/ DROPS OPHTHALMIC at 10:09

## 2021-10-07 RX ADMIN — MOXIFLOXACIN 1 DROP: 5 SOLUTION/ DROPS OPHTHALMIC at 10:06

## 2021-10-07 RX ADMIN — DICLOFENAC SODIUM 1 DROP: 1 SOLUTION OPHTHALMIC at 10:19

## 2021-10-07 RX ADMIN — MIDAZOLAM 1 MG: 1 INJECTION INTRAMUSCULAR; INTRAVENOUS at 11:14

## 2021-10-07 RX ADMIN — DICLOFENAC SODIUM 1 DROP: 1 SOLUTION OPHTHALMIC at 10:26

## 2021-10-07 RX ADMIN — MOXIFLOXACIN 1 DROP: 5 SOLUTION/ DROPS OPHTHALMIC at 09:54

## 2021-10-07 RX ADMIN — TROPICAMIDE 1 DROP: 10 SOLUTION/ DROPS OPHTHALMIC at 10:23

## 2021-10-07 RX ADMIN — LIDOCAINE HYDROCHLORIDE 1 ML: 10 INJECTION, SOLUTION EPIDURAL; INFILTRATION; INTRACAUDAL; PERINEURAL at 10:33

## 2021-10-07 RX ADMIN — DICLOFENAC SODIUM 1 DROP: 1 SOLUTION OPHTHALMIC at 10:03

## 2021-10-07 RX ADMIN — TROPICAMIDE 1 DROP: 10 SOLUTION/ DROPS OPHTHALMIC at 10:07

## 2021-10-07 RX ADMIN — PHENYLEPHRINE HYDROCHLORIDE 1 DROP: 25 SOLUTION/ DROPS OPHTHALMIC at 10:25

## 2021-10-07 RX ADMIN — MOXIFLOXACIN 1 DROP: 5 SOLUTION/ DROPS OPHTHALMIC at 10:22

## 2021-10-07 RX ADMIN — FENTANYL CITRATE 50 MCG: 50 INJECTION, SOLUTION INTRAMUSCULAR; INTRAVENOUS at 11:08

## 2021-10-07 RX ADMIN — PROPARACAINE HYDROCHLORIDE 1 DROP: 5 SOLUTION/ DROPS OPHTHALMIC at 09:51

## 2021-10-07 RX ADMIN — PHENYLEPHRINE HYDROCHLORIDE 1 DROP: 25 SOLUTION/ DROPS OPHTHALMIC at 10:00

## 2021-10-07 NOTE — DISCHARGE INSTRUCTIONS
POST CATARACT SURGERY EYE INSTRUCTIONS  [facility]           Eye Medications    VIGAMOX/OFLOXACIN (Tan Cap)    KETOROLAC (Hernadez Cap)    PREDNISOLONE (Pink or White Cap)    If you opted for the individual bottles of eye medications follow these instructions.    *Instill one drop from each bottle into the operative eye 3 times a day until each  bottle is empty.    *Wait 5 minutes between each drop.    If you opted for the one bottle containing all 3 eye medications, follow these instructions.    *Instill one drop into the operative eye 3 times a day until the bottle is empty.       - If your are currently being treated for glaucoma please continue your    medication as normally prescribed.     - Wear eye shield while sleeping for 3 days     - Refrain from heavy lifting, bending, straining, or strenuous activity for 1      week.     - Do not rub or push on the operative eye for 1 week (you may wipe the    eye lid(s) gently with a wet wash cloth to remove matter from                         eyelashes).     - You may bathe or shower, but do not get the eye wet for 1 month      (swimming, hot tubs or other water activities).     - Minor itching, scratching sensation, burning sensation, etc. is normal.     Report any severe eye pain or loss of vision.     - Please call our office at [phone] if you have questions or     concerns.

## 2021-10-07 NOTE — ANESTHESIA POSTPROCEDURE EVALUATION
Patient: Ashli GALAVIZ Rogers    Procedure: Procedure(s):  PHACOEMULSIFICATION, CATARACT, WITH MULTIFOCAL INTRAOCULAR LENS IMPLANT INSERTION, left       Diagnosis:Cataract [H26.9]  Diagnosis Additional Information: No value filed.    Anesthesia Type:  MAC    Note:  Disposition: Outpatient   Postop Pain Control: Uneventful            Sign Out: Well controlled pain   PONV: No   Neuro/Psych: Uneventful            Sign Out: Acceptable/Baseline neuro status   Airway/Respiratory: Uneventful            Sign Out: Acceptable/Baseline resp. status   CV/Hemodynamics: Uneventful            Sign Out: Acceptable CV status   Other NRE: NONE   DID A NON-ROUTINE EVENT OCCUR? No    Event details/Postop Comments:  Pt was happy with anesthesia care.  No complications.  I will follow up with the pt if needed.           Last vitals:  Vitals Value Taken Time   /78 10/07/21 1145   Temp     Pulse 54 10/07/21 1140   Resp 16 10/07/21 1145   SpO2 97 % 10/07/21 1145   Vitals shown include unvalidated device data.    Electronically Signed By: ADRIENNE Leal CRNA  October 7, 2021  12:06 PM

## 2021-10-07 NOTE — OP NOTE
[facility]  Ophthalmology Operative Note    PREOPERATIVE DIAGNOSIS: Cataract, Left eye.   POSTOPERATIVE DIAGNOSIS: Cataract, Left eye.   PROCEDURE: Kelman phacoemulsification with posterior chamber lens implant, Left eye.   ANESTHESIA: Topical.   COMPLICATIONS: None.   INDICATIONS FOR PROCEDURE: Ashli Rogers is a 66 year old female who was evaluated preoperatively in the eye clinic and found to have a visually significant cataract of the Left eye which decreased her vision to the 20/50 level. This decreased vision was interfering with activities of daily living such as reading and driving. The patient was deemed a suitable candidate for cataract extraction. The risks, benefits and alternatives were explained to the patient. All questions were answered and the patient elected to proceed with cataract extraction and lens implant.   DESCRIPTION OF PROCEDURE: After informed consent was obtained, the patient was given topical lidocaine gel to the Left eye and it was prepped with Betadine and draped in the usual sterile manner. A paracentesis was made at the 12 o'clock position and the anterior chamber was inflated with Endocoat. A clear corneal incision using a 2.5 mm metal keratome was made at the 3 o'clock position. A continuous tear anterior capsulorrhexis was started with the cystotome and completed with the Utrata forceps. The nucleus was hydrodissected and found to rotate freely. The nucleus was removed with the phacoemulsification handpiece and the residual cortex with the irrigation-aspiration handpiece. The capsular bag was inspected and found to be free of any tears or breaks and was inflated with Healon. A model TFATOO 22.5 diopter posterior chamber lens was inserted into the capsular bag without complications. It was found to center well and rotate freely. The residual Healon was removed with the irrigation-aspiration handpiece. The anterior chamber was inflated with balanced salt solution.  The eye was palpated and found to be of normal physiologic pressure. The wound was inspected and found to be free of any leaks. One drop each of Betadine and Imprimis were instilled in the Left eye and a shield was placed over the eye. The patient was transferred to the postanesthesia care unit in stable condition.     Jordan Collins M.D.

## 2021-10-07 NOTE — ANESTHESIA CARE TRANSFER NOTE
Patient: Ashli GALAVIZ Rogers    Procedure: Procedure(s):  PHACOEMULSIFICATION, CATARACT, WITH MULTIFOCAL INTRAOCULAR LENS IMPLANT INSERTION, left       Diagnosis: Cataract [H26.9]  Diagnosis Additional Information: No value filed.    Anesthesia Type:   MAC     Note:    Oropharynx: oropharynx clear of all foreign objects and spontaneously breathing  Level of Consciousness: awake  Oxygen Supplementation: room air    Independent Airway: airway patency satisfactory and stable  Dentition: dentition unchanged  Vital Signs Stable: post-procedure vital signs reviewed and stable  Report to RN Given: handoff report given  Patient transferred to: Phase II    Handoff Report: Identifed the Patient, Identified the Reponsible Provider, Reviewed the pertinent medical history, Discussed the surgical course, Reviewed Intra-OP anesthesia mangement and issues during anesthesia, Set expectations for post-procedure period and Allowed opportunity for questions and acknowledgement of understanding      Vitals:  Vitals Value Taken Time   /78 10/07/21 1145   Temp     Pulse 54 10/07/21 1140   Resp 16 10/07/21 1145   SpO2 97 % 10/07/21 1145   Vitals shown include unvalidated device data.    Electronically Signed By: ADRIENNE Leal CRNA  October 7, 2021  12:06 PM

## 2021-10-07 NOTE — ANESTHESIA PREPROCEDURE EVALUATION
Anesthesia Pre-Procedure Evaluation    Patient: Ashli Rogers   MRN: [id number] : 1955        Preoperative Diagnosis: Cataract [H26.9]    Procedure : Procedure(s):  PHACOEMULSIFICATION, CATARACT, WITH MULTIFOCAL INTRAOCULAR LENS IMPLANT INSERTION          Past Medical History:   Diagnosis Date     Cataracts, bilateral      Closed fracture of medial malleolus     Distal avulsion fracture of the medial malleoli     Diverticulitis of colon 2/3/2017     DJD (degenerative joint disease) 2011    right knee, s/p injections     Erythema nodosum      Generalized osteoarthrosis, unspecified site     Hips     S/P total hip arthroplasty 2003    right     S/P total knee arthroplasty     left     Sleep apnea     on CPAP      Past Surgical History:   Procedure Laterality Date     C TOTAL HIP ARTHROPLASTY Right 2004    Hip Replacement, Total     C TOTAL KNEE ARTHROPLASTY Left      COLONOSCOPY N/A 2016    normal, repeat 10 years     ESOPHAGOSCOPY, GASTROSCOPY, DUODENOSCOPY (EGD), COMBINED N/A 2016    Procedure: COMBINED ESOPHAGOSCOPY, GASTROSCOPY, DUODENOSCOPY (EGD), BIOPSY SINGLE OR MULTIPLE;  Surgeon: Ervin Johnston MD;  Location:  GI     FINGER SURGERY Right 2019    ganglion cyst removed     ZZHC COLONOSCOPY W/WO BRUSH/WASH  2006      Allergies   Allergen Reactions     Cephalexin Monohydrate      keflex     Gluten Meal GI Disturbance     Warfarin Sodium      coumadin      Social History     Tobacco Use     Smoking status: Never Smoker     Smokeless tobacco: Never Used   Substance Use Topics     Alcohol use: Yes     Comment: 2 drinks per month      Wt Readings from Last 1 Encounters:   10/06/21 145.3 kg (320 lb 6.4 oz)        Anesthesia Evaluation   Pt has had prior anesthetic. Type: General and MAC.    No history of anesthetic complications       ROS/MED HX  ENT/Pulmonary:     (+) sleep apnea, uses CPAP, allergic rhinitis,     Neurologic:  - neg neurologic ROS      Cardiovascular:     (+) Dyslipidemia -----Previous cardiac testing   Echo: Date: Results:    Stress Test: Date: Results:    ECG Reviewed: Date: 10/6/21 Results:  Appears normal, NSR, sinus bradycardia, normal axis, normal intervals, no acute ST/T changes c/w ischemia, no LVH by voltage criteria, unchanged from previous tracings  Cath: Date: Results:      METS/Exercise Tolerance: >4 METS    Hematologic:  - neg hematologic  ROS     Musculoskeletal:  - neg musculoskeletal ROS     GI/Hepatic:     (+) GERD, hiatal hernia,     Renal/Genitourinary:  - neg Renal ROS     Endo:     (+) Obesity,     Psychiatric/Substance Use:  - neg psychiatric ROS     Infectious Disease:  - neg infectious disease ROS     Malignancy:  - neg malignancy ROS     Other:  - neg other ROS          Physical Exam    Airway  airway exam normal      Mallampati: II   TM distance: > 3 FB   Neck ROM: full   Mouth opening: > 3 cm    Respiratory Devices and Support         Dental  no notable dental history         Cardiovascular   cardiovascular exam normal       Rhythm and rate: regular and normal     Pulmonary   pulmonary exam normal        breath sounds clear to auscultation           OUTSIDE LABS:  CBC:   Lab Results   Component Value Date    WBC 7.9 04/19/2019    WBC 9.2 01/24/2017    HGB 13.9 04/19/2019    HGB 14.1 01/24/2017    HCT 42.9 04/19/2019    HCT 42.2 01/24/2017     04/19/2019     01/24/2017     BMP:   Lab Results   Component Value Date     09/28/2020     01/24/2017    POTASSIUM 4.0 09/28/2020    POTASSIUM 4.2 01/24/2017    CHLORIDE 106 09/28/2020    CHLORIDE 105 01/24/2017    CO2 30 09/28/2020    CO2 29 01/24/2017    BUN 11 09/28/2020    BUN 11 01/24/2017    CR 0.86 09/28/2020    CR 0.86 01/24/2017    GLC 94 09/28/2020    GLC 91 01/24/2017     COAGS: No results found for: PTT, INR, FIBR  POC: No results found for: BGM, HCG, HCGS  HEPATIC:   Lab Results   Component Value Date    ALBUMIN 3.7 01/24/2017    PROTTOTAL  8.2 01/24/2017    ALT 31 03/17/2021    AST 16 01/24/2017    ALKPHOS 89 01/24/2017    BILITOTAL 0.4 01/24/2017     OTHER:   Lab Results   Component Value Date    PH 6.5 05/28/2002    EDUARDO 9.7 09/28/2020    TSH 4.90 (H) 03/17/2021    T4 0.89 03/17/2021    CRP 70.2 (H) 01/24/2017    SED 14 08/11/2011       Anesthesia Plan    ASA Status:  3   NPO Status:  NPO Appropriate    Anesthesia Type: MAC.     - Reason for MAC: straight local not clinically adequate   Induction: N/a.   Maintenance: N/A.        Consents    Anesthesia Plan(s) and associated risks, benefits, and realistic alternatives discussed. Questions answered and patient/representative(s) expressed understanding.     - Discussed with:  Patient      - Extended Intubation/Ventilatory Support Discussed: No.      - Patient is DNR/DNI Status: No    Use of blood products discussed: No .     Postoperative Care    Pain management: IV analgesics.        Comments:    The risks and benefits of anesthesia, and the alternatives where applicable, have been discussed with the patient, and they wish to proceed.            Jewell White, ADRIENNE CRNA

## 2021-10-18 ENCOUNTER — APPOINTMENT (OUTPATIENT)
Dept: LAB | Facility: CLINIC | Age: 66
End: 2021-10-18
Payer: COMMERCIAL

## 2021-10-18 PROCEDURE — U0003 INFECTIOUS AGENT DETECTION BY NUCLEIC ACID (DNA OR RNA); SEVERE ACUTE RESPIRATORY SYNDROME CORONAVIRUS 2 (SARS-COV-2) (CORONAVIRUS DISEASE [COVID-19]), AMPLIFIED PROBE TECHNIQUE, MAKING USE OF HIGH THROUGHPUT TECHNOLOGIES AS DESCRIBED BY CMS-2020-01-R: HCPCS

## 2021-10-18 PROCEDURE — U0005 INFEC AGEN DETEC AMPLI PROBE: HCPCS

## 2021-10-19 LAB — SARS-COV-2 RNA RESP QL NAA+PROBE: NEGATIVE

## 2021-10-20 NOTE — TELEPHONE ENCOUNTER
The patient can get portable CPAP device much smaller that she can carry easily.  She should contact our DME provider.  Mike Wilder MD

## 2021-10-21 ENCOUNTER — ANESTHESIA (OUTPATIENT)
Dept: SURGERY | Facility: CLINIC | Age: 66
End: 2021-10-21
Payer: MEDICARE

## 2021-10-21 ENCOUNTER — ANESTHESIA EVENT (OUTPATIENT)
Dept: SURGERY | Facility: CLINIC | Age: 66
End: 2021-10-21
Payer: MEDICARE

## 2021-10-21 ENCOUNTER — HOSPITAL ENCOUNTER (OUTPATIENT)
Facility: CLINIC | Age: 66
Discharge: HOME | End: 2021-10-21
Attending: OPHTHALMOLOGY | Admitting: OPHTHALMOLOGY
Payer: MEDICARE

## 2021-10-21 ENCOUNTER — HOSPITAL ENCOUNTER (OUTPATIENT)
Facility: CLINIC | Age: 66
Discharge: HOME | End: 2021-10-21
Attending: OPHTHALMOLOGY | Admitting: OPHTHALMOLOGY

## 2021-10-21 VITALS
OXYGEN SATURATION: 95 % | SYSTOLIC BLOOD PRESSURE: 128 MMHG | WEIGHT: 293 LBS | DIASTOLIC BLOOD PRESSURE: 80 MMHG | TEMPERATURE: 98.1 F | RESPIRATION RATE: 18 BRPM | HEART RATE: 50 BPM | BODY MASS INDEX: 44.12 KG/M2

## 2021-10-21 PROCEDURE — V2788 PRESBYOPIA-CORRECT FUNCTION: HCPCS | Performed by: OPHTHALMOLOGY

## 2021-10-21 PROCEDURE — 250N000009 HC RX 250: Performed by: OPHTHALMOLOGY

## 2021-10-21 PROCEDURE — 250N000011 HC RX IP 250 OP 636: Performed by: NURSE ANESTHETIST, CERTIFIED REGISTERED

## 2021-10-21 PROCEDURE — 761N000008 HC RECOVERY CATRACT PACKAGE: Performed by: OPHTHALMOLOGY

## 2021-10-21 PROCEDURE — 370N000004 HC ANESTHESIA CATARACT PACKAGE: Performed by: OPHTHALMOLOGY

## 2021-10-21 PROCEDURE — 250N000011 HC RX IP 250 OP 636: Performed by: OPHTHALMOLOGY

## 2021-10-21 PROCEDURE — 360N000007 HC CATARACT SURGICAL PACKAGE: Performed by: OPHTHALMOLOGY

## 2021-10-21 PROCEDURE — V2632 POST CHMBR INTRAOCULAR LENS: HCPCS | Performed by: OPHTHALMOLOGY

## 2021-10-21 DEVICE — IMPLANTABLE DEVICE: Type: IMPLANTABLE DEVICE | Site: EYE | Status: FUNCTIONAL

## 2021-10-21 RX ORDER — PROPARACAINE HYDROCHLORIDE 5 MG/ML
1 SOLUTION/ DROPS OPHTHALMIC ONCE
Status: COMPLETED | OUTPATIENT
Start: 2021-10-21 | End: 2021-10-21

## 2021-10-21 RX ORDER — DICLOFENAC SODIUM 1 MG/ML
1 SOLUTION/ DROPS OPHTHALMIC
Status: COMPLETED | OUTPATIENT
Start: 2021-10-21 | End: 2021-10-21

## 2021-10-21 RX ORDER — MOXIFLOXACIN 5 MG/ML
1 SOLUTION/ DROPS OPHTHALMIC
Status: COMPLETED | OUTPATIENT
Start: 2021-10-21 | End: 2021-10-21

## 2021-10-21 RX ORDER — FENTANYL CITRATE 50 UG/ML
INJECTION, SOLUTION INTRAMUSCULAR; INTRAVENOUS PRN
Status: DISCONTINUED | OUTPATIENT
Start: 2021-10-21 | End: 2021-10-21

## 2021-10-21 RX ORDER — TROPICAMIDE 10 MG/ML
1 SOLUTION/ DROPS OPHTHALMIC
Status: COMPLETED | OUTPATIENT
Start: 2021-10-21 | End: 2021-10-21

## 2021-10-21 RX ORDER — PHENYLEPHRINE HYDROCHLORIDE 25 MG/ML
1 SOLUTION/ DROPS OPHTHALMIC
Status: COMPLETED | OUTPATIENT
Start: 2021-10-21 | End: 2021-10-21

## 2021-10-21 RX ORDER — LIDOCAINE 40 MG/G
CREAM TOPICAL
Status: DISCONTINUED | OUTPATIENT
Start: 2021-10-21 | End: 2021-10-21 | Stop reason: HOSPADM

## 2021-10-21 RX ADMIN — PROPARACAINE HYDROCHLORIDE 1 DROP: 5 SOLUTION/ DROPS OPHTHALMIC at 08:48

## 2021-10-21 RX ADMIN — MOXIFLOXACIN 1 DROP: 5 SOLUTION/ DROPS OPHTHALMIC at 08:55

## 2021-10-21 RX ADMIN — DICLOFENAC SODIUM 1 DROP: 1 SOLUTION OPHTHALMIC at 08:49

## 2021-10-21 RX ADMIN — FENTANYL CITRATE 50 MCG: 50 INJECTION, SOLUTION INTRAMUSCULAR; INTRAVENOUS at 09:52

## 2021-10-21 RX ADMIN — PHENYLEPHRINE HYDROCHLORIDE 1 DROP: 25 SOLUTION/ DROPS OPHTHALMIC at 08:55

## 2021-10-21 RX ADMIN — MIDAZOLAM 1 MG: 1 INJECTION INTRAMUSCULAR; INTRAVENOUS at 10:01

## 2021-10-21 RX ADMIN — DICLOFENAC SODIUM 1 DROP: 1 SOLUTION OPHTHALMIC at 08:55

## 2021-10-21 RX ADMIN — FENTANYL CITRATE 50 MCG: 50 INJECTION, SOLUTION INTRAMUSCULAR; INTRAVENOUS at 10:01

## 2021-10-21 RX ADMIN — MIDAZOLAM 1 MG: 1 INJECTION INTRAMUSCULAR; INTRAVENOUS at 09:52

## 2021-10-21 RX ADMIN — PHENYLEPHRINE HYDROCHLORIDE 1 DROP: 25 SOLUTION/ DROPS OPHTHALMIC at 08:49

## 2021-10-21 RX ADMIN — PROPARACAINE HYDROCHLORIDE 1 DROP: 5 SOLUTION/ DROPS OPHTHALMIC at 09:00

## 2021-10-21 RX ADMIN — MOXIFLOXACIN 1 DROP: 5 SOLUTION/ DROPS OPHTHALMIC at 08:48

## 2021-10-21 RX ADMIN — TROPICAMIDE 1 DROP: 10 SOLUTION/ DROPS OPHTHALMIC at 08:49

## 2021-10-21 RX ADMIN — TROPICAMIDE 1 DROP: 10 SOLUTION/ DROPS OPHTHALMIC at 09:00

## 2021-10-21 RX ADMIN — MOXIFLOXACIN 1 DROP: 5 SOLUTION/ DROPS OPHTHALMIC at 09:00

## 2021-10-21 RX ADMIN — DICLOFENAC SODIUM 1 DROP: 1 SOLUTION OPHTHALMIC at 09:00

## 2021-10-21 RX ADMIN — PHENYLEPHRINE HYDROCHLORIDE 1 DROP: 25 SOLUTION/ DROPS OPHTHALMIC at 09:00

## 2021-10-21 RX ADMIN — TROPICAMIDE 1 DROP: 10 SOLUTION/ DROPS OPHTHALMIC at 08:56

## 2021-10-21 NOTE — ANESTHESIA POSTPROCEDURE EVALUATION
Patient: Ashli GALAVIZ Rogers    Procedure: Procedure(s):  PHACOEMULSIFICATION, CATARACT, WITH MULTIFOCAL INTRAOCULAR LENS IMPLANT INSERTION,right       Diagnosis:Cataract [H26.9]  Diagnosis Additional Information: No value filed.    Anesthesia Type:  MAC    Note:  Disposition: Outpatient   Postop Pain Control: Uneventful            Sign Out: Well controlled pain   PONV: No   Neuro/Psych: Uneventful            Sign Out: Acceptable/Baseline neuro status   Airway/Respiratory: Uneventful            Sign Out: Acceptable/Baseline resp. status   CV/Hemodynamics: Uneventful            Sign Out: Acceptable CV status   Other NRE: NONE   DID A NON-ROUTINE EVENT OCCUR? No    Event details/Postop Comments:  Pt was happy with anesthesia care.  No complications.  I will follow up with the pt if needed.           Last vitals:  Vitals Value Taken Time   /87 10/21/21 1025   Temp     Pulse 53 10/21/21 1025   Resp 18 10/21/21 1025   SpO2 94 % 10/21/21 1028   Vitals shown include unvalidated device data.    Electronically Signed By: ADRIENNE De Jesus CRNA  October 21, 2021  10:28 AM

## 2021-10-21 NOTE — OP NOTE
PREOPERATIVE DIAGNOSIS: Cataract, Right eye.   POSTOPERATIVE DIAGNOSIS: Cataract, Right eye.   OPERATION: Phacoemulsification with implantation of posterior chamber intraocular lens, Right eye.   ANESTHESIA: Monitored anesthesia care.   INDICATIONS FOR SURGERY: Ashli Rogers has noted a progressive decline in the vision of her Right eye secondary to a cataract. This has affected her ability to perform routine functions including reading. The patient has progressive cataract changes consistent with her vision and symptoms.     PROCEDURE: Informed consent was obtained from the patient preoperatively with the risks and alternatives reviewed, including the possibility of loss of vision. In the preoperative area, the patient was administered topical anesthetic consisting of 2% Xylocaine jelly. The patient was taken to the operating room. The face was prepped and draped in the usual sterile fashion. Attention was directed to the Right eye. A stab incision was made at the limbus with a 15-degree blade. Viscoat was used to replace aqueous. A keratome was used to make a limbal self-sealing incision 2.5 mm in diameter. A curvilinear capsulorrhexis was performed with the Utrata forceps. Hydrodissection was carried out. The nucleus was removed with the phacoemulsification handpiece in a four-quadrant cracking technique. The cortex was removed with the irrigation and aspiration handpiece. The posterior capsule remained intact. Provisc was used to inflate the capsular bag. A posterior chamber intraocular lens was taken from its case and inspected. It was free of defects and it was folded into the shooter. The lens was then injected into the eye by directing the leading haptic into the capsular bag. The trailing haptic was then placed in the eye with a haptic . The lens centered well. The Provisc was removed from the eye with the I&A handpiece. The eye was inflated with balanced salt solution. The wound was inspected and  found to be watertight. Topical Vigamox and Pred Forte were applied. An eye shield was placed over the eye. The patient tolerated the procedure well and left the operating area in good condition.     Implant Name Type Inv. Item Serial No.  Lot No. LRB No. Used Action   acrysof IQ PanOptix   [id number]  Right 1 Implanted       Praveen Guy M.D.

## 2021-10-21 NOTE — ANESTHESIA CARE TRANSFER NOTE
Patient: Ashli GALAVIZ Rogers    Procedure: Procedure(s):  PHACOEMULSIFICATION, CATARACT, WITH MULTIFOCAL INTRAOCULAR LENS IMPLANT INSERTION,right       Diagnosis: Cataract [H26.9]  Diagnosis Additional Information: No value filed.    Anesthesia Type:   MAC     Note:    Oropharynx: oropharynx clear of all foreign objects and spontaneously breathing  Level of Consciousness: drowsy  Oxygen Supplementation: face mask    Independent Airway: airway patency satisfactory and stable  Dentition: dentition unchanged  Vital Signs Stable: post-procedure vital signs reviewed and stable  Report to RN Given: handoff report given  Patient transferred to: Phase II    Handoff Report: Identifed the Patient, Identified the Reponsible Provider, Reviewed the pertinent medical history, Discussed the surgical course, Reviewed Intra-OP anesthesia mangement and issues during anesthesia, Set expectations for post-procedure period and Allowed opportunity for questions and acknowledgement of understanding      Vitals:  Vitals Value Taken Time   /89 10/21/21 1019   Temp     Pulse 62 10/21/21 1019   Resp     SpO2 97 % 10/21/21 1023   Vitals shown include unvalidated device data.    Electronically Signed By: ADRIENNE De Jesus CRNA  October 21, 2021  10:24 AM

## 2021-10-21 NOTE — ANESTHESIA PREPROCEDURE EVALUATION
Anesthesia Pre-Procedure Evaluation    Patient: Ashli Rogers   MRN: [id number] : 1955        Preoperative Diagnosis: Cataract [H26.9]    Procedure : Procedure(s):  PHACOEMULSIFICATION, CATARACT, WITH MULTIFOCAL INTRAOCULAR LENS IMPLANT INSERTION          Past Medical History:   Diagnosis Date     Cataracts, bilateral      Closed fracture of medial malleolus     Distal avulsion fracture of the medial malleoli     Diverticulitis of colon 2/3/2017     DJD (degenerative joint disease) 2011    right knee, s/p injections     Erythema nodosum      Generalized osteoarthrosis, unspecified site     Hips     S/P total hip arthroplasty 2003    right     S/P total knee arthroplasty     left     Sleep apnea     on CPAP      Past Surgical History:   Procedure Laterality Date     C TOTAL HIP ARTHROPLASTY Right 2004    Hip Replacement, Total     C TOTAL KNEE ARTHROPLASTY Left      COLONOSCOPY N/A 2016    normal, repeat 10 years     ESOPHAGOSCOPY, GASTROSCOPY, DUODENOSCOPY (EGD), COMBINED N/A 2016    Procedure: COMBINED ESOPHAGOSCOPY, GASTROSCOPY, DUODENOSCOPY (EGD), BIOPSY SINGLE OR MULTIPLE;  Surgeon: Ervin Johnston MD;  Location:  GI     FINGER SURGERY Right 2019    ganglion cyst removed     PHACOEMULSIFICATION WITH STANDARD INTRAOCULAR LENS IMPLANT Left 10/7/2021    Procedure: PHACOEMULSIFICATION, CATARACT, WITH MULTIFOCAL INTRAOCULAR LENS IMPLANT INSERTION, left;  Surgeon: Jordan Collins MD;  Location:  OR     [facility] COLONOSCOPY W/WO BRUSH/WASH  2006      Allergies   Allergen Reactions     Cephalexin Monohydrate      keflex     Gluten Meal GI Disturbance     Warfarin Sodium      coumadin      Social History     Tobacco Use     Smoking status: Never Smoker     Smokeless tobacco: Never Used   Substance Use Topics     Alcohol use: Yes     Comment: 2 drinks per month      Wt Readings from Last 1 Encounters:   10/06/21 145.3 kg (320 lb 6.4 oz)        Anesthesia  Evaluation   Pt has had prior anesthetic. Type: General and MAC.    No history of anesthetic complications       ROS/MED HX  ENT/Pulmonary:     (+) sleep apnea, uses CPAP, allergic rhinitis,     Neurologic:  - neg neurologic ROS     Cardiovascular:     (+) Dyslipidemia -----Previous cardiac testing   Echo: Date: Results:    Stress Test: Date: Results:    ECG Reviewed: Date: 10/6/21 Results:  Appears normal, NSR, sinus bradycardia, normal axis, normal intervals, no acute ST/T changes c/w ischemia, no LVH by voltage criteria, unchanged from previous tracings  Cath: Date: Results:      METS/Exercise Tolerance: >4 METS    Hematologic:  - neg hematologic  ROS     Musculoskeletal:  - neg musculoskeletal ROS     GI/Hepatic:     (+) GERD, hiatal hernia,     Renal/Genitourinary:  - neg Renal ROS     Endo:     (+) Obesity,     Psychiatric/Substance Use:  - neg psychiatric ROS     Infectious Disease:  - neg infectious disease ROS     Malignancy:  - neg malignancy ROS     Other:  - neg other ROS          Physical Exam    Airway  airway exam normal      Mallampati: II   TM distance: > 3 FB   Neck ROM: full   Mouth opening: > 3 cm    Respiratory Devices and Support         Dental  no notable dental history         Cardiovascular   cardiovascular exam normal       Rhythm and rate: regular and normal     Pulmonary   pulmonary exam normal        breath sounds clear to auscultation           OUTSIDE LABS:  CBC:   Lab Results   Component Value Date    WBC 7.9 04/19/2019    WBC 9.2 01/24/2017    HGB 13.9 04/19/2019    HGB 14.1 01/24/2017    HCT 42.9 04/19/2019    HCT 42.2 01/24/2017     04/19/2019     01/24/2017     BMP:   Lab Results   Component Value Date     09/28/2020     01/24/2017    POTASSIUM 4.0 09/28/2020    POTASSIUM 4.2 01/24/2017    CHLORIDE 106 09/28/2020    CHLORIDE 105 01/24/2017    CO2 30 09/28/2020    CO2 29 01/24/2017    BUN 11 09/28/2020    BUN 11 01/24/2017    CR 0.86 09/28/2020    CR 0.86  01/24/2017    GLC 94 09/28/2020    GLC 91 01/24/2017     COAGS: No results found for: PTT, INR, FIBR  POC: No results found for: BGM, HCG, HCGS  HEPATIC:   Lab Results   Component Value Date    ALBUMIN 3.7 01/24/2017    PROTTOTAL 8.2 01/24/2017    ALT 31 03/17/2021    AST 16 01/24/2017    ALKPHOS 89 01/24/2017    BILITOTAL 0.4 01/24/2017     OTHER:   Lab Results   Component Value Date    PH 6.5 05/28/2002    EDUARDO 9.7 09/28/2020    TSH 4.90 (H) 03/17/2021    T4 0.89 03/17/2021    CRP 70.2 (H) 01/24/2017    SED 14 08/11/2011       Anesthesia Plan    ASA Status:  2   NPO Status:  NPO Appropriate    Anesthesia Type: MAC.     - Reason for MAC: straight local not clinically adequate   Induction: N/a.   Maintenance: N/A.        Consents    Anesthesia Plan(s) and associated risks, benefits, and realistic alternatives discussed. Questions answered and patient/representative(s) expressed understanding.     - Discussed with:  Patient      - Extended Intubation/Ventilatory Support Discussed: No.      - Patient is DNR/DNI Status: No    Use of blood products discussed: No .     Postoperative Care    Pain management: IV analgesics.        Comments:    The risks and benefits of anesthesia, and the alternatives where applicable, have been discussed with the patient, and they wish to proceed.            ADRIENNE Leal CRNA

## 2021-10-21 NOTE — ANESTHESIA CARE TRANSFER NOTE
Patient: Ashli GALAVIZ Rogers    Procedure: Procedure(s):  PHACOEMULSIFICATION, CATARACT, WITH MULTIFOCAL INTRAOCULAR LENS IMPLANT INSERTION,right       Diagnosis: Cataract [H26.9]  Diagnosis Additional Information: No value filed.    Anesthesia Type:   MAC     Note:    Oropharynx: oropharynx clear of all foreign objects and spontaneously breathing  Level of Consciousness: drowsy  Oxygen Supplementation: face mask    Independent Airway: airway patency satisfactory and stable  Dentition: dentition unchanged  Vital Signs Stable: post-procedure vital signs reviewed and stable  Report to RN Given: handoff report given  Patient transferred to: Phase II    Handoff Report: Identifed the Patient, Identified the Reponsible Provider, Reviewed the pertinent medical history, Discussed the surgical course, Reviewed Intra-OP anesthesia mangement and issues during anesthesia, Set expectations for post-procedure period and Allowed opportunity for questions and acknowledgement of understanding      Vitals:  Vitals Value Taken Time   BP     Temp     Pulse     Resp     SpO2         Electronically Signed By: ADRIENNE De Jesus CRNA  October 21, 2021  10:15 AM

## 2021-10-21 NOTE — DISCHARGE INSTRUCTIONS
POST CATARACT SURGERY EYE INSTRUCTIONS  [facility]           Eye Medications    VIGAMOX/OFLOXACIN (Tan Cap)    KETOROLAC (Hernadez Cap)    PREDNISOLONE (Pink or White Cap)    If you opted for the individual bottles of eye medications follow these instructions.    *Instill one drop from each bottle into the operative eye 3 times a day until each  bottle is empty.    *Wait 5 minutes between each drop.    If you opted for the one bottle containing all 3 eye medications, follow these instructions.    *Instill one drop into the operative eye 3 times a day until the bottle is empty.       - If your are currently being treated for glaucoma please continue your    medication as normally prescribed.     - Wear eye shield while sleeping for 3 days     - Refrain from heavy lifting, bending, straining, or strenuous activity for 1      week.     - Do not rub or push on the operative eye for 1 week (you may wipe the    eye lid(s) gently with a wet wash cloth to remove matter from                         eyelashes).     - You may bathe or shower, but do not get the eye wet for 1 month      (swimming, hot tubs or other water activities).     - Minor itching, scratching sensation, burning sensation, etc. is normal.     Report any severe eye pain or loss of vision.     - Please call our office at [phone] if you have questions or     concerns.

## 2021-10-23 ENCOUNTER — HEALTH MAINTENANCE LETTER (OUTPATIENT)
Age: 66
End: 2021-10-23

## 2021-10-25 DIAGNOSIS — E03.8 SUBCLINICAL HYPOTHYROIDISM: ICD-10-CM

## 2021-10-26 NOTE — TELEPHONE ENCOUNTER
Levothyroxine Routing refill request to provider for review/approval because:  Labs out of range:   TSH  AUNG Archer

## 2021-10-27 ENCOUNTER — MYC MEDICAL ADVICE (OUTPATIENT)
Dept: FAMILY MEDICINE | Facility: CLINIC | Age: 66
End: 2021-10-27

## 2021-10-27 RX ORDER — LEVOTHYROXINE SODIUM 75 UG/1
75 TABLET ORAL DAILY
Qty: 30 TABLET | Refills: 0 | Status: SHIPPED | OUTPATIENT
Start: 2021-10-27 | End: 2021-11-24

## 2021-10-27 NOTE — TELEPHONE ENCOUNTER
Patient needs follow-up of labs per discussion with PCP in March. This was to be completed in May. A 1 month supply given until this is completed.    Pilar Ruff PA-C  Covering for Zelda Garcia

## 2021-10-28 NOTE — TELEPHONE ENCOUNTER
Patient is informed all labs can be completed at the same time.  Closing this encounter.  CRISTO XiongN, RN

## 2021-11-13 DIAGNOSIS — F41.1 GAD (GENERALIZED ANXIETY DISORDER): ICD-10-CM

## 2021-11-13 DIAGNOSIS — I10 ESSENTIAL HYPERTENSION: ICD-10-CM

## 2021-11-15 RX ORDER — HYDROCHLOROTHIAZIDE 25 MG/1
TABLET ORAL
Qty: 90 TABLET | Refills: 0 | Status: SHIPPED | OUTPATIENT
Start: 2021-11-15 | End: 2022-01-10

## 2021-11-16 RX ORDER — BUSPIRONE HYDROCHLORIDE 7.5 MG/1
TABLET ORAL
Qty: 90 TABLET | Refills: 3 | Status: SHIPPED | OUTPATIENT
Start: 2021-11-16 | End: 2022-11-08

## 2021-11-22 ENCOUNTER — TRANSCRIBE ORDERS (OUTPATIENT)
Dept: OTHER | Age: 66
End: 2021-11-22
Payer: COMMERCIAL

## 2021-11-24 ENCOUNTER — OFFICE VISIT (OUTPATIENT)
Dept: FAMILY MEDICINE | Facility: CLINIC | Age: 66
End: 2021-11-24
Payer: COMMERCIAL

## 2021-11-24 ENCOUNTER — APPOINTMENT (OUTPATIENT)
Dept: LAB | Facility: CLINIC | Age: 66
End: 2021-11-24
Payer: COMMERCIAL

## 2021-11-24 VITALS
RESPIRATION RATE: 20 BRPM | DIASTOLIC BLOOD PRESSURE: 80 MMHG | OXYGEN SATURATION: 96 % | TEMPERATURE: 97 F | HEART RATE: 92 BPM | BODY MASS INDEX: 39.68 KG/M2 | HEIGHT: 72 IN | WEIGHT: 293 LBS | SYSTOLIC BLOOD PRESSURE: 122 MMHG

## 2021-11-24 DIAGNOSIS — E03.8 SUBCLINICAL HYPOTHYROIDISM: ICD-10-CM

## 2021-11-24 DIAGNOSIS — Z23 NEED FOR PROPHYLACTIC VACCINATION AND INOCULATION AGAINST INFLUENZA: ICD-10-CM

## 2021-11-24 DIAGNOSIS — Z01.818 PREOP GENERAL PHYSICAL EXAM: Primary | ICD-10-CM

## 2021-11-24 DIAGNOSIS — M17.31 POST-TRAUMATIC OSTEOARTHRITIS OF RIGHT KNEE: ICD-10-CM

## 2021-11-24 LAB
BASOPHILS # BLD AUTO: 0.1 10E3/UL (ref 0–0.2)
BASOPHILS NFR BLD AUTO: 1 %
EOSINOPHIL # BLD AUTO: 0.3 10E3/UL (ref 0–0.7)
EOSINOPHIL NFR BLD AUTO: 5 %
ERYTHROCYTE [DISTWIDTH] IN BLOOD BY AUTOMATED COUNT: 13.7 % (ref 10–15)
HCT VFR BLD AUTO: 41.1 % (ref 35–47)
HGB BLD-MCNC: 13.4 G/DL (ref 11.7–15.7)
IMM GRANULOCYTES # BLD: 0 10E3/UL
IMM GRANULOCYTES NFR BLD: 0 %
LYMPHOCYTES # BLD AUTO: 2.1 10E3/UL (ref 0.8–5.3)
LYMPHOCYTES NFR BLD AUTO: 31 %
MCH RBC QN AUTO: 30.5 PG (ref 26.5–33)
MCHC RBC AUTO-ENTMCNC: 32.6 G/DL (ref 31.5–36.5)
MCV RBC AUTO: 94 FL (ref 78–100)
MONOCYTES # BLD AUTO: 0.6 10E3/UL (ref 0–1.3)
MONOCYTES NFR BLD AUTO: 9 %
NEUTROPHILS # BLD AUTO: 3.7 10E3/UL (ref 1.6–8.3)
NEUTROPHILS NFR BLD AUTO: 54 %
NRBC # BLD AUTO: 0 10E3/UL
NRBC BLD AUTO-RTO: 0 /100
PLATELET # BLD AUTO: 272 10E3/UL (ref 150–450)
RBC # BLD AUTO: 4.39 10E6/UL (ref 3.8–5.2)
TSH SERPL DL<=0.005 MIU/L-ACNC: 3.37 MU/L (ref 0.4–4)
WBC # BLD AUTO: 6.8 10E3/UL (ref 4–11)

## 2021-11-24 PROCEDURE — 84443 ASSAY THYROID STIM HORMONE: CPT | Performed by: NURSE PRACTITIONER

## 2021-11-24 PROCEDURE — 85025 COMPLETE CBC W/AUTO DIFF WBC: CPT | Performed by: NURSE PRACTITIONER

## 2021-11-24 PROCEDURE — 36415 COLL VENOUS BLD VENIPUNCTURE: CPT | Performed by: NURSE PRACTITIONER

## 2021-11-24 PROCEDURE — 99214 OFFICE O/P EST MOD 30 MIN: CPT | Mod: 25 | Performed by: NURSE PRACTITIONER

## 2021-11-24 PROCEDURE — G0008 ADMIN INFLUENZA VIRUS VAC: HCPCS | Performed by: NURSE PRACTITIONER

## 2021-11-24 PROCEDURE — 90662 IIV NO PRSV INCREASED AG IM: CPT | Performed by: NURSE PRACTITIONER

## 2021-11-24 ASSESSMENT — MIFFLIN-ST. JEOR: SCORE: 2075.82

## 2021-11-24 ASSESSMENT — PAIN SCALES - GENERAL: PAINLEVEL: SEVERE PAIN (7)

## 2021-11-24 NOTE — PROGRESS NOTES
[address] [address]  Phone: [phone]  Fax: [phone]  Primary Provider: Zelda Garcia  Pre-op Performing Provider: CAREN COATS      PREOPERATIVE EVALUATION:  Today's date: 11/24/2021    Ashli Rogers is a 66 year old female who presents for a preoperative evaluation.    Surgical Information:  Surgery/Procedure: R total Knee Arthroplasty  Surgery Location: [facility]   Surgeon: Dr. Acuna  Surgery Date: 12/03/2021  Time of Surgery: 7:30am  Where patient plans to recover: At home with family  Fax number for surgical facility: [phone]    Type of Anesthesia Anticipated: General    Assessment & Plan     The proposed surgical procedure is considered LOW risk.    Preop general physical exam  Reviewed recommended screenings and ordered appropriate testing for pt's preop  - CBC with Platelets & Differential    Post-traumatic osteoarthritis of right knee    - CBC with Platelets & Differential    Subclinical hypothyroidism    - TSH with free T4 reflex    Need for prophylactic vaccination and inoculation against influenza  Given today  - INFLUENZA, QUAD, HIGH DOSE, PF, 65YR + (FLUZONE HD)         Risks and Recommendations:  The patient has the following additional risks and recommendations for perioperative complications:   - Consult Hospitalist / IM to assist with post-op medical management    Medication Instructions:  Patient is to take all scheduled medications on the day of surgery EXCEPT for modifications listed below:  Nsaids and supplements.     RECOMMENDATION:  APPROVAL GIVEN to proceed with proposed procedure, without further diagnostic evaluation.            Subjective     HPI related to upcoming procedure: osteoarthritis right knee    Preop Questions 11/24/2021   1. Have you ever had a heart attack or stroke? No   2. Have you ever had surgery on your heart or blood vessels, such as a stent placement, a coronary  artery bypass, or surgery on an artery in your head, neck, heart, or legs? No   3. Do you have chest pain with activity? No   4. Do you have a history of  heart failure? No   5. Do you currently have a cold, bronchitis or symptoms of other infection? No   6. Do you have a cough, shortness of breath, or wheezing? YES - related to allergies   7. Do you or anyone in your family have previous history of blood clots? No   8. Do you or does anyone in your family have a serious bleeding problem such as prolonged bleeding following surgeries or cuts? No   9. Have you ever had problems with anemia or been told to take iron pills? No   10. Have you had any abnormal blood loss such as black, tarry or bloody stools, or abnormal vaginal bleeding? No   11. Have you ever had a blood transfusion? No   12. Are you willing to have a blood transfusion if it is medically needed before, during, or after your surgery? Yes   13. Have you or any of your relatives ever had problems with anesthesia? No   14. Do you have sleep apnea, excessive snoring or daytime drowsiness? YES - uses cpap will bring DOS   14a. Do you have a CPAP machine? Yes   15. Do you have any artifical heart valves or other implanted medical devices like a pacemaker, defibrillator, or continuous glucose monitor? No   16. Do you have artificial joints? YES - Left knee and Right hip   17. Are you allergic to latex? No   18. Is there any chance that you may be pregnant? -       Health Care Directive:  Patient does not have a Health Care Directive or Living Will: Patient states has Advance Directive and will bring in a copy to clinic.    Preoperative Review of :   reviewed - no record of controlled substances prescribed.      Status of Chronic Conditions:  See problem list for active medical problems.  Problems all longstanding and stable, except as noted/documented.  See ROS for pertinent symptoms related to these conditions.      Review of Systems  Constitutional,  neuro, ENT, endocrine, pulmonary, cardiac, gastrointestinal, genitourinary, musculoskeletal, integument and psychiatric systems are negative, except as otherwise noted.    Patient Active Problem List    Diagnosis Date Noted     ZELDA (obstructive sleep apnea) 08/01/2020     Priority: Medium     Hyperlipidemia LDL goal <130 08/01/2020     Priority: Medium     Bunion, left 08/01/2019     Priority: Medium     Pain due to varicose veins of both lower extremities 08/01/2019     Priority: Medium     Diverticulitis of colon 02/03/2017     Priority: Medium     Morbid obesity (H) 07/05/2016     Priority: Medium     Hiatal hernia 07/05/2016     Priority: Medium     Gastroesophageal reflux disease, esophagitis presence not specified 03/18/2016     Priority: Medium     IMO Regulatory Load OCT 2020       Arthritis of knee, left 08/13/2013     Priority: Medium     ADD (attention deficit disorder) 03/03/2013     Priority: Medium     On vivanse and is being followed by a mental health specialist        Advanced directives, counseling/discussion 10/28/2011     Priority: Medium     Pt is going to take home the packet and is going to look at it.  MP/MA       DJD (degenerative joint disease) of knee 05/24/2011     Priority: Medium     Allergic rhinitis 11/13/2006     Priority: Medium     Problem list name updated by automated process. Provider to review       Varicose veins of lower extremities with complications 09/28/2005     Priority: Medium     Problem list name updated by automated process. Provider to review       S/P total hip arthroplasty 04/04/2003     Priority: Medium      Past Medical History:   Diagnosis Date     Cataracts, bilateral      Closed fracture of medial malleolus     Distal avulsion fracture of the medial malleoli     Diverticulitis of colon 2/3/2017     DJD (degenerative joint disease) 05/24/2011    right knee, s/p injections     Erythema nodosum      Generalized osteoarthrosis, unspecified site     Hips     S/P  total hip arthroplasty 04/04/2003    right     S/P total knee arthroplasty     left     Sleep apnea     on CPAP     Past Surgical History:   Procedure Laterality Date     C TOTAL HIP ARTHROPLASTY Right 04/26/2004    Hip Replacement, Total     C TOTAL KNEE ARTHROPLASTY Left 2013     COLONOSCOPY N/A 05/11/2016    normal, repeat 10 years     ESOPHAGOSCOPY, GASTROSCOPY, DUODENOSCOPY (EGD), COMBINED N/A 05/11/2016    Procedure: COMBINED ESOPHAGOSCOPY, GASTROSCOPY, DUODENOSCOPY (EGD), BIOPSY SINGLE OR MULTIPLE;  Surgeon: Ervin Johnston MD;  Location: [facility]     FINGER SURGERY Right 04/2019    ganglion cyst removed     PHACOEMULSIFICATION WITH STANDARD INTRAOCULAR LENS IMPLANT Left 10/7/2021    Procedure: PHACOEMULSIFICATION, CATARACT, WITH MULTIFOCAL INTRAOCULAR LENS IMPLANT INSERTION, left;  Surgeon: Jordan Collins MD;  Location: [facility]     PHACOEMULSIFICATION WITH STANDARD INTRAOCULAR LENS IMPLANT Right 10/21/2021    Procedure: PHACOEMULSIFICATION, CATARACT, WITH MULTIFOCAL INTRAOCULAR LENS IMPLANT INSERTION,right;  Surgeon: Praveen Guy MD;  Location: [facility]     [facility] COLONOSCOPY W/WO BRUSH/WASH  01/11/2006     Current Outpatient Medications   Medication Sig Dispense Refill     CALCIUM-MAGNESIUM-ZINC PO Take 1 tablet by mouth daily Reported on 3/28/2017       Cholecalciferol (VITAMIN D3 PO) Take by mouth daily       IBUPROFEN PO        levothyroxine (SYNTHROID/LEVOTHROID) 75 MCG tablet Take 1 tablet (75 mcg) by mouth daily 30 tablet 0     NEW MED CBD oil that rolls on her knee       Omega-3 Fatty Acids (FISH OIL PO)        omeprazole (PRILOSEC OTC) 20 MG EC tablet Take 1 tablet (20 mg) by mouth daily 90 tablet 1     simvastatin (ZOCOR) 10 MG tablet Take 1 tablet (10 mg) by mouth At Bedtime 90 tablet 3     ZYRTEC 10 MG OR TABS ONE TABLET DAILY 30 8       Allergies   Allergen Reactions     Cephalexin Monohydrate      keflex     Gluten Meal GI Disturbance     Warfarin Sodium      coumadin        Social  "History     Tobacco Use     Smoking status: Never Smoker     Smokeless tobacco: Never Used   Substance Use Topics     Alcohol use: Yes     Comment: 2 drinks per month     Family History   Problem Relation Age of Onset     Cancer Father          of lung cancer     Heart Disease Daughter         2 holes in her heart     History   Drug Use No         Objective     /80   Pulse 92   Temp 97  F (36.1  C) (Temporal)   Resp 20   Ht 1.819 m (5' 11.6\")   Wt 144 kg (317 lb 8 oz)   LMP 2005   SpO2 96%   Breastfeeding No   BMI 43.54 kg/m      Physical Exam    GENERAL APPEARANCE: healthy, alert and no distress     EYES: EOMI, PERRL     HENT: ear canals and TM's normal and nose and mouth without ulcers or lesions     NECK: no adenopathy, no asymmetry, masses, or scars and thyroid normal to palpation     RESP: lungs clear to auscultation - no rales, rhonchi or wheezes     CV: regular rates and rhythm, normal S1 S2, no S3 or S4 and no murmur, click or rub     ABDOMEN:  soft, nontender, no HSM or masses and bowel sounds normal     MS: extremities normal- no gross deformities noted, no evidence of inflammation in joints, FROM in all extremities.     SKIN: no suspicious lesions or rashes     NEURO: Normal strength and tone, sensory exam grossly normal, mentation intact and speech normal     PSYCH: mentation appears normal. and affect normal/bright     LYMPHATICS: No cervical adenopathy    Recent Labs   Lab Test 20  1503      POTASSIUM 4.0   CR 0.86        Diagnostics:  Labs pending at this time.  Results will be reviewed when available.   No EKG this visit, completed in the last 90 days.    Revised Cardiac Risk Index (RCRI):  The patient has the following serious cardiovascular risks for perioperative complications:   - No serious cardiac risks = 0 points     RCRI Interpretation: 0 points: Class I (very low risk - 0.4% complication rate)           Signed Electronically by: ADRIENNE Greco " CNP  Copy of this evaluation report is provided to requesting physician.

## 2021-11-24 NOTE — PATIENT INSTRUCTIONS

## 2021-11-26 ENCOUNTER — TELEPHONE (OUTPATIENT)
Dept: ALLERGY | Age: 66
End: 2021-11-26

## 2021-11-30 ENCOUNTER — LAB SERVICES (OUTPATIENT)
Dept: LAB | Age: 66
End: 2021-11-30

## 2021-11-30 DIAGNOSIS — Z01.812 ENCOUNTER FOR PREPROCEDURE SCREENING LABORATORY TESTING FOR COVID-19: ICD-10-CM

## 2021-11-30 DIAGNOSIS — Z11.52 ENCOUNTER FOR PREPROCEDURE SCREENING LABORATORY TESTING FOR COVID-19: ICD-10-CM

## 2021-11-30 LAB
SARS-COV-2 RNA RESP QL NAA+PROBE: NOT DETECTED
SERVICE CMNT-IMP: NORMAL
SERVICE CMNT-IMP: NORMAL

## 2021-11-30 PROCEDURE — U0005 INFEC AGEN DETEC AMPLI PROBE: HCPCS | Performed by: CLINICAL MEDICAL LABORATORY

## 2021-11-30 PROCEDURE — U0003 INFECTIOUS AGENT DETECTION BY NUCLEIC ACID (DNA OR RNA); SEVERE ACUTE RESPIRATORY SYNDROME CORONAVIRUS 2 (SARS-COV-2) (CORONAVIRUS DISEASE [COVID-19]), AMPLIFIED PROBE TECHNIQUE, MAKING USE OF HIGH THROUGHPUT TECHNOLOGIES AS DESCRIBED BY CMS-2020-01-R: HCPCS | Performed by: CLINICAL MEDICAL LABORATORY

## 2021-12-02 DIAGNOSIS — G47.33 OSA (OBSTRUCTIVE SLEEP APNEA): Primary | ICD-10-CM

## 2021-12-02 ASSESSMENT — ENCOUNTER SYMPTOMS
HEADACHES: 0
CHEST TIGHTNESS: 0
SLEEP DISTURBANCE: 0
NERVOUS/ANXIOUS: 0
WHEEZING: 0
FEVER: 0
ADENOPATHY: 0
SORE THROAT: 0
VOMITING: 0
FACIAL SWELLING: 0
APPETITE CHANGE: 0
DIARRHEA: 0
ABDOMINAL PAIN: 0

## 2021-12-03 ENCOUNTER — OFFICE VISIT (OUTPATIENT)
Dept: ALLERGY | Age: 66
End: 2021-12-03

## 2021-12-03 VITALS
SYSTOLIC BLOOD PRESSURE: 136 MMHG | OXYGEN SATURATION: 98 % | HEIGHT: 66 IN | WEIGHT: 217 LBS | TEMPERATURE: 97.1 F | HEART RATE: 63 BPM | DIASTOLIC BLOOD PRESSURE: 82 MMHG | BODY MASS INDEX: 34.87 KG/M2

## 2021-12-03 DIAGNOSIS — Z11.52 ENCOUNTER FOR PREPROCEDURE SCREENING LABORATORY TESTING FOR COVID-19: ICD-10-CM

## 2021-12-03 DIAGNOSIS — J31.0 MIXED RHINITIS: ICD-10-CM

## 2021-12-03 DIAGNOSIS — J45.30 MILD PERSISTENT ALLERGIC ASTHMA: Primary | ICD-10-CM

## 2021-12-03 DIAGNOSIS — K21.9 GASTROESOPHAGEAL REFLUX DISEASE, UNSPECIFIED WHETHER ESOPHAGITIS PRESENT: ICD-10-CM

## 2021-12-03 DIAGNOSIS — Z01.812 ENCOUNTER FOR PREPROCEDURE SCREENING LABORATORY TESTING FOR COVID-19: ICD-10-CM

## 2021-12-03 PROCEDURE — 99214 OFFICE O/P EST MOD 30 MIN: CPT | Performed by: ALLERGY & IMMUNOLOGY

## 2021-12-03 PROCEDURE — 94010 BREATHING CAPACITY TEST: CPT | Performed by: ALLERGY & IMMUNOLOGY

## 2021-12-03 RX ORDER — ALBUTEROL SULFATE 90 UG/1
AEROSOL, METERED RESPIRATORY (INHALATION)
Qty: 1 EACH | Refills: 0 | Status: SHIPPED | OUTPATIENT
Start: 2021-12-03 | End: 2023-01-27 | Stop reason: SDUPTHER

## 2021-12-03 RX ORDER — FLUTICASONE PROPIONATE AND SALMETEROL 250; 50 UG/1; UG/1
1 POWDER RESPIRATORY (INHALATION) DAILY
Qty: 1 EACH | Refills: 5 | Status: SHIPPED | OUTPATIENT
Start: 2021-12-03 | End: 2023-07-28 | Stop reason: SDUPTHER

## 2021-12-07 ENCOUNTER — TELEPHONE (OUTPATIENT)
Dept: ALLERGY | Age: 66
End: 2021-12-07

## 2021-12-10 ENCOUNTER — TELEPHONE (OUTPATIENT)
Dept: SLEEP MEDICINE | Facility: CLINIC | Age: 66
End: 2021-12-10
Payer: COMMERCIAL

## 2021-12-10 NOTE — TELEPHONE ENCOUNTER
CPAP pressure was increased to 8-14 but her mouth is drying out so she would like to go back to 6-12.     Patient should adjust humidity if her mouth is drying out.     Writer walked the patient through how to adjust her humidity level on her CPAP over the phone. She changed humidity from 4 to 6 and will see if she notices an improvement. She will contact the clinic if she needs further assistance.     Josy BURGER CMA

## 2021-12-15 ENCOUNTER — TRANSFERRED RECORDS (OUTPATIENT)
Dept: HEALTH INFORMATION MANAGEMENT | Facility: CLINIC | Age: 66
End: 2021-12-15
Payer: COMMERCIAL

## 2021-12-15 ENCOUNTER — MEDICAL CORRESPONDENCE (OUTPATIENT)
Dept: HEALTH INFORMATION MANAGEMENT | Facility: CLINIC | Age: 66
End: 2021-12-15
Payer: COMMERCIAL

## 2021-12-16 RX ORDER — CLOBETASOL PROPIONATE 0.5 MG/G
OINTMENT TOPICAL
Qty: 30 G | Refills: 1 | Status: SHIPPED | OUTPATIENT
Start: 2021-12-16 | End: 2022-07-25 | Stop reason: ALTCHOICE

## 2021-12-18 ENCOUNTER — HEALTH MAINTENANCE LETTER (OUTPATIENT)
Age: 66
End: 2021-12-18

## 2021-12-27 DIAGNOSIS — E03.8 SUBCLINICAL HYPOTHYROIDISM: ICD-10-CM

## 2021-12-28 RX ORDER — LEVOTHYROXINE SODIUM 75 UG/1
75 TABLET ORAL DAILY
Qty: 30 TABLET | Refills: 0 | Status: SHIPPED | OUTPATIENT
Start: 2021-12-28 | End: 2022-02-01

## 2021-12-28 NOTE — TELEPHONE ENCOUNTER
"Requested Prescriptions   Signed Prescriptions Disp Refills    levothyroxine (SYNTHROID/LEVOTHROID) 75 MCG tablet 30 tablet 0     Sig: Take 1 tablet (75 mcg) by mouth daily       Thyroid Protocol Passed - 12/27/2021  2:40 PM        Passed - Patient is 12 years or older        Passed - Recent (12 mo) or future (30 days) visit within the authorizing provider's specialty     Patient has had an office visit with the authorizing provider or a provider within the authorizing providers department within the previous 12 mos or has a future within next 30 days. See \"Patient Info\" tab in inbasket, or \"Choose Columns\" in Meds & Orders section of the refill encounter.              Passed - Medication is active on med list        Passed - Normal TSH on file in past 12 months     Recent Labs   Lab Test 11/24/21  1012   TSH 3.37              Passed - No active pregnancy on record     If patient is pregnant or has had a positive pregnancy test, please check TSH.          Passed - No positive pregnancy test in past 12 months     If patient is pregnant or has had a positive pregnancy test, please check TSH.             Thanks,  AUNG Powell  [facility]     "

## 2021-12-29 ENCOUNTER — TELEPHONE (OUTPATIENT)
Dept: FAMILY MEDICINE | Age: 66
End: 2021-12-29

## 2021-12-29 RX ORDER — LOSARTAN POTASSIUM 100 MG/1
100 TABLET ORAL DAILY
Qty: 90 TABLET | Refills: 0 | Status: SHIPPED | OUTPATIENT
Start: 2021-12-29 | End: 2022-01-20 | Stop reason: SDUPTHER

## 2021-12-29 RX ORDER — LOSARTAN POTASSIUM 100 MG/1
100 TABLET ORAL DAILY
Qty: 90 TABLET | Refills: 0 | Status: SHIPPED | OUTPATIENT
Start: 2021-12-29 | End: 2021-12-29 | Stop reason: SDUPTHER

## 2021-12-29 RX ORDER — LOSARTAN POTASSIUM 100 MG/1
100 TABLET ORAL DAILY
Qty: 90 TABLET | Refills: 0 | OUTPATIENT
Start: 2021-12-29 | End: 2021-12-29 | Stop reason: SDUPTHER

## 2021-12-30 ENCOUNTER — HOSPITAL ENCOUNTER (OUTPATIENT)
Dept: PHYSICAL THERAPY | Facility: CLINIC | Age: 66
Setting detail: THERAPIES SERIES
End: 2021-12-30
Attending: FAMILY MEDICINE
Payer: MEDICARE

## 2021-12-30 PROCEDURE — 97110 THERAPEUTIC EXERCISES: CPT | Mod: GP | Performed by: PHYSICAL THERAPIST

## 2021-12-30 PROCEDURE — 97161 PT EVAL LOW COMPLEX 20 MIN: CPT | Mod: GP | Performed by: PHYSICAL THERAPIST

## 2021-12-30 NOTE — PROGRESS NOTES
[facility]    OUTPATIENT PHYSICAL THERAPY ORTHOPEDIC EVALUATION  PLAN OF TREATMENT FOR OUTPATIENT REHABILITATION  (COMPLETE FOR INITIAL CLAIMS ONLY)  Patient's Last Name, First Name, M.I.  YOB: 1955  Rogers,Ashli GALAVIZ    Provider s Name:  [facility]   Medical Record No.  [id number]   Start of Care Date:  12/30/21   Onset Date:  12/01/21   Type:     _X__PT   ___OT   ___SLP Medical Diagnosis:  right TKA 12/1/21     PT Diagnosis:  right TKA 12/1/21   Visits from SOC:  1      _________________________________________________________________________________  Plan of Treatment/Functional Goals:  ADL retraining,balance training,ROM,strengthening,stretching        modalities as needed   Goals  Goal Identifier: 1  Goal Description: instruction in HEP and compliant with it 5 of 7 days to improve quality of life   Target Date: 03/28/22    Goal Identifier: 2  Goal Description: patient to have improved tolerance to activity currently LEFS 39/80   Target Date: 03/28/22    Goal Identifier: 3  Goal Description: patient to have decrease in knee pain currently 0-7/10 goal is 0-3/10   Target Date: 03/28/22                  Therapy Frequency:     Predicted Duration of Therapy Intervention:  1-2 x week x 1-3 months     Sally Armando, PT                 I CERTIFY THE NEED FOR THESE SERVICES FURNISHED UNDER        THIS PLAN OF TREATMENT AND WHILE UNDER MY CARE .             Physician Signature               Date    X_____________________________________________________                             Certification Date From:  12/30/21   Certification Date To:  03/28/22    Referring Provider:  ryan carpenter MD    Initial Assessment        See Epic Evaluation Start of Care Date: 12/30/21

## 2021-12-30 NOTE — PROGRESS NOTES
12/30/21 1000   General Information   Type of Visit Initial OP Ortho PT Evaluation   Start of Care Date 12/30/21   Referring Physician ryan carpenter MD   Patient/Family Goals Statement knee rehab   Orders Evaluate and Treat   Date of Order 12/15/21   Certification Required? Yes   Medical Diagnosis right TKA 12/1/21   Surgical/Medical history reviewed Yes   Precautions/Limitations no known precautions/limitations   Body Part(s)   Body Part(s) Knee   Presentation and Etiology   Pertinent history of current problem (include personal factors and/or comorbidities that impact the POC) patient reports she has right TKA 12/1/21, PMH left knee 8 years ago , right PEDRO 20 years . sleep apnea , GI issues ,  right hip will dislocate 1-2 x year and she is able to reduce it on her own    Impairments A. Pain;D. Decreased ROM;F. Decreased strength and endurance;H. Impaired gait   Functional Limitations perform activities of daily living;perform desired leisure / sports activities   Symptom Location right knee    Onset date of current episode/exacerbation 12/01/21   Chronicity New   Pain rating (0-10 point scale) Best (/10);Worst (/10)   Best (/10) 0/10   Worst (/10) 7/10   Pain quality A. Sharp;B. Dull;C. Aching   Frequency of pain/symptoms C. With activity   Pain/symptoms exacerbated by J. ADL;K. Home tasks   Pain/symptoms eased by C. Rest;E. Changing positions;H. Cold   Progression of symptoms since onset: Improved   Prior Level of Function   Functional Level Prior Comment HEP    Current Level of Function   Current Community Support Family/friend caregiver   Patient role/employment history F. Retired   Fall Risk Screen   Fall screen completed by PT   Have you fallen 2 or more times in the past year? Yes   Have you fallen and had an injury in the past year? No   Timed Up and Go score (seconds) less than 13    Is patient a fall risk? No   Fall screen comments normally falls 2x year due to active lifestyle    Abuse Screen (yes  response referral indicated)   Feels Unsafe at Home or Work/School no   Feels Threatened by Someone no   Does Anyone Try to Keep You From Having Contact with Others or Doing Things Outside Your Home? no   Physical Signs of Abuse Present no   Knee Objective Findings   Side (if bilateral, select both right and left) Right   Integumentary  healed incision    Gait/Locomotion has single point cane for outside but no AD in home and normal    Knee ROM Comment B AROM 0-125    Knee/Hip Strength Comments good strength 4/5   Knee Special Test Comments did not test due to recent TKA    Planned Therapy Interventions   Planned Therapy Interventions ADL retraining;balance training;ROM;strengthening;stretching   Planned Modality Interventions   Planned Modality Interventions Comments modalities as needed    Clinical Impression   Criteria for Skilled Therapeutic Interventions Met yes, treatment indicated   PT Diagnosis right TKA 12/1/21   Functional limitations due to impairments LEFS 39/80   Clinical Presentation Stable/Uncomplicated   Clinical Presentation Rationale patient seen s/p right TKA 12/1/21 overall doing well , Rx is skilled PT instruction in exercises and HEP    Clinical Decision Making (Complexity) Low complexity   Predicted Duration of Therapy Intervention (days/wks) 1-2 x week x 1-3 months    Risk & Benefits of therapy have been explained Yes   Patient, Family & other staff in agreement with plan of care Yes   Education Assessment   Preferred Learning Style Listening;Reading;Demonstration   Barriers to Learning No barriers   ORTHO GOALS   PT Ortho Eval Goals 1;2;3   Ortho Goal 1   Goal Identifier 1   Goal Description instruction in HEP and compliant with it 5 of 7 days to improve quality of life    Target Date 03/28/22   Ortho Goal 2   Goal Identifier 2   Goal Description patient to have improved tolerance to activity currently LEFS 39/80    Target Date 03/28/22   Ortho Goal 3   Goal Identifier 3   Goal Description  patient to have decrease in knee pain currently 0-7/10 goal is 0-3/10    Target Date 03/28/22   Total Evaluation Time   PT Guillermina Low Complexity Minutes (56615) 15   Therapy Certification   Certification date from 12/30/21   Certification date to 03/28/22   Medical Diagnosis right TKA 12/1/21

## 2021-12-31 DIAGNOSIS — E03.8 SUBCLINICAL HYPOTHYROIDISM: ICD-10-CM

## 2021-12-31 RX ORDER — LEVOTHYROXINE SODIUM 75 UG/1
TABLET ORAL
Qty: 30 TABLET | Refills: 0 | OUTPATIENT
Start: 2021-12-31

## 2022-01-04 ENCOUNTER — HOSPITAL ENCOUNTER (OUTPATIENT)
Dept: PHYSICAL THERAPY | Facility: CLINIC | Age: 67
Setting detail: THERAPIES SERIES
End: 2022-01-04
Attending: FAMILY MEDICINE
Payer: COMMERCIAL

## 2022-01-04 PROCEDURE — 97110 THERAPEUTIC EXERCISES: CPT | Mod: GP | Performed by: PHYSICAL THERAPIST

## 2022-01-06 ENCOUNTER — HOSPITAL ENCOUNTER (OUTPATIENT)
Dept: PHYSICAL THERAPY | Facility: CLINIC | Age: 67
Setting detail: THERAPIES SERIES
End: 2022-01-06
Attending: FAMILY MEDICINE
Payer: COMMERCIAL

## 2022-01-06 PROCEDURE — 97110 THERAPEUTIC EXERCISES: CPT | Mod: GP | Performed by: PHYSICAL THERAPIST

## 2022-01-09 DIAGNOSIS — I10 ESSENTIAL HYPERTENSION: ICD-10-CM

## 2022-01-10 RX ORDER — HYDROCHLOROTHIAZIDE 25 MG/1
TABLET ORAL
Qty: 90 TABLET | Refills: 0 | Status: SHIPPED | OUTPATIENT
Start: 2022-01-10 | End: 2022-02-23

## 2022-01-11 ENCOUNTER — HOSPITAL ENCOUNTER (OUTPATIENT)
Dept: PHYSICAL THERAPY | Facility: CLINIC | Age: 67
Setting detail: THERAPIES SERIES
End: 2022-01-11
Attending: FAMILY MEDICINE
Payer: COMMERCIAL

## 2022-01-11 PROCEDURE — 97110 THERAPEUTIC EXERCISES: CPT | Mod: GP | Performed by: PHYSICAL THERAPIST

## 2022-01-18 ENCOUNTER — HOSPITAL ENCOUNTER (OUTPATIENT)
Dept: PHYSICAL THERAPY | Facility: CLINIC | Age: 67
Setting detail: THERAPIES SERIES
End: 2022-01-18
Attending: FAMILY MEDICINE
Payer: COMMERCIAL

## 2022-01-18 PROCEDURE — 97110 THERAPEUTIC EXERCISES: CPT | Mod: GP | Performed by: PHYSICAL THERAPIST

## 2022-01-20 ENCOUNTER — HOSPITAL ENCOUNTER (OUTPATIENT)
Dept: PHYSICAL THERAPY | Facility: CLINIC | Age: 67
Setting detail: THERAPIES SERIES
End: 2022-01-20
Attending: FAMILY MEDICINE
Payer: COMMERCIAL

## 2022-01-20 ENCOUNTER — OFFICE VISIT (OUTPATIENT)
Dept: FAMILY MEDICINE | Age: 67
End: 2022-01-20

## 2022-01-20 ENCOUNTER — LAB SERVICES (OUTPATIENT)
Dept: LAB | Age: 67
End: 2022-01-20

## 2022-01-20 VITALS
HEIGHT: 66 IN | BODY MASS INDEX: 35.2 KG/M2 | WEIGHT: 219 LBS | SYSTOLIC BLOOD PRESSURE: 136 MMHG | DIASTOLIC BLOOD PRESSURE: 84 MMHG | TEMPERATURE: 97.2 F | RESPIRATION RATE: 16 BRPM | HEART RATE: 76 BPM

## 2022-01-20 DIAGNOSIS — E78.5 DYSLIPIDEMIA: ICD-10-CM

## 2022-01-20 DIAGNOSIS — E03.9 HYPOTHYROIDISM, UNSPECIFIED TYPE: ICD-10-CM

## 2022-01-20 DIAGNOSIS — E66.01 CLASS 2 SEVERE OBESITY DUE TO EXCESS CALORIES WITH SERIOUS COMORBIDITY AND BODY MASS INDEX (BMI) OF 35.0 TO 35.9 IN ADULT (CMD): ICD-10-CM

## 2022-01-20 DIAGNOSIS — I10 PRIMARY HYPERTENSION: ICD-10-CM

## 2022-01-20 DIAGNOSIS — I10 PRIMARY HYPERTENSION: Primary | ICD-10-CM

## 2022-01-20 DIAGNOSIS — Z51.81 MEDICATION MONITORING ENCOUNTER: ICD-10-CM

## 2022-01-20 LAB
ALBUMIN SERPL-MCNC: 4.3 G/DL (ref 3.6–5.1)
ALP SERPL-CCNC: 112 U/L (ref 45–130)
ALT SERPL W/O P-5'-P-CCNC: 35 U/L (ref 4–38)
AST SERPL-CCNC: 33 U/L (ref 14–43)
BASOPHIL %: 0.7 % (ref 0–1.2)
BASOPHIL ABSOLUTE #: 0.1 10*3/UL (ref 0–0.1)
BILIRUB SERPL-MCNC: 1.1 MG/DL (ref 0–1.3)
BUN SERPL-MCNC: 17 MG/DL (ref 7–20)
CALCIUM SERPL-MCNC: 10.5 MG/DL (ref 8.6–10.6)
CHLORIDE SERPL-SCNC: 102 MMOL/L (ref 96–107)
CHOLEST SERPL-MCNC: 163 MG/DL (ref 140–200)
CO2 SERPL-SCNC: 30 MMOL/L (ref 22–32)
CREAT SERPL-MCNC: 0.8 MG/DL (ref 0.5–1.4)
DIFFERENTIAL TYPE: ABNORMAL
EOSINOPHIL %: 1.7 % (ref 0–10)
EOSINOPHIL ABSOLUTE #: 0.1 10*3/UL (ref 0–0.5)
GFR SERPL CREATININE-BSD FRML MDRD: >60 ML/MIN/{1.73M2}
GFR SERPL CREATININE-BSD FRML MDRD: >60 ML/MIN/{1.73M2}
GLUCOSE SERPL-MCNC: 106 MG/DL (ref 70–200)
HDLC SERPL-MCNC: 61 MG/DL
HEMATOCRIT: 41.5 % (ref 34–45)
HEMOGLOBIN: 13.2 G/DL (ref 11.2–15.7)
IMMATURE GRANULOCYTE ABSOLUTE: 0.02 10*3/UL (ref 0–0.05)
IMMATURE GRANULOCYTE PERCENT: 0.3 % (ref 0–0.5)
LDLC SERPL CALC-MCNC: 88 MG/DL (ref 30–100)
LYMPH PERCENT: 22.4 % (ref 20.5–51.1)
LYMPHOCYTE ABSOLUTE #: 1.7 10*3/UL (ref 1.2–3.4)
MEAN CORPUSCULAR HGB CONCENTRATION: 31.8 % (ref 32–36)
MEAN CORPUSCULAR HGB: 30.3 PG (ref 27–34)
MEAN CORPUSCULAR VOLUME: 95.4 FL (ref 79–95)
MEAN PLATELET VOLUME: 11.3 FL (ref 8.6–12.4)
MONOCYTE ABSOLUTE #: 0.8 10*3/UL (ref 0.2–0.9)
MONOCYTE PERCENT: 10.4 % (ref 4.3–12.9)
NEUTROPHIL ABSOLUTE #: 4.8 10*3/UL (ref 1.4–6.5)
NEUTROPHIL PERCENT: 64.5 % (ref 34–73.5)
PLATELET COUNT: 286 10*3/UL (ref 150–400)
POTASSIUM SERPL-SCNC: 4.1 MMOL/L (ref 3.5–5.3)
PROT SERPL-MCNC: 7.5 G/DL (ref 6.4–8.5)
RED BLOOD CELL COUNT: 4.35 10*6/UL (ref 3.7–5.2)
RED CELL DISTRIBUTION WIDTH: 13.4 % (ref 11.3–14.8)
SODIUM SERPL-SCNC: 139 MMOL/L (ref 136–146)
TRIGL SERPL-MCNC: 72 MG/DL (ref 0–200)
TSH SERPL DL<=0.05 MIU/L-ACNC: 1.68 M[IU]/L (ref 0.3–4.82)
WHITE BLOOD CELL COUNT: 7.5 10*3/UL (ref 4–10)

## 2022-01-20 PROCEDURE — 80053 COMPREHEN METABOLIC PANEL: CPT | Performed by: FAMILY MEDICINE

## 2022-01-20 PROCEDURE — 84443 ASSAY THYROID STIM HORMONE: CPT | Performed by: FAMILY MEDICINE

## 2022-01-20 PROCEDURE — 99214 OFFICE O/P EST MOD 30 MIN: CPT | Performed by: FAMILY MEDICINE

## 2022-01-20 PROCEDURE — 80061 LIPID PANEL: CPT | Performed by: FAMILY MEDICINE

## 2022-01-20 PROCEDURE — 97110 THERAPEUTIC EXERCISES: CPT | Mod: GP | Performed by: PHYSICAL THERAPIST

## 2022-01-20 PROCEDURE — 85025 COMPLETE CBC W/AUTO DIFF WBC: CPT | Performed by: FAMILY MEDICINE

## 2022-01-20 PROCEDURE — 36415 COLL VENOUS BLD VENIPUNCTURE: CPT | Performed by: FAMILY MEDICINE

## 2022-01-20 RX ORDER — LOSARTAN POTASSIUM 100 MG/1
100 TABLET ORAL DAILY
Qty: 90 TABLET | Refills: 3 | Status: SHIPPED | OUTPATIENT
Start: 2022-01-20 | End: 2022-03-25

## 2022-01-20 RX ORDER — VERAPAMIL HYDROCHLORIDE 180 MG/1
180 CAPSULE, EXTENDED RELEASE ORAL NIGHTLY
Qty: 90 CAPSULE | Refills: 3 | Status: SHIPPED | OUTPATIENT
Start: 2022-01-20 | End: 2023-02-11 | Stop reason: SDUPTHER

## 2022-01-20 RX ORDER — LEVOTHYROXINE SODIUM 137 UG/1
137 TABLET ORAL DAILY
Qty: 90 TABLET | Refills: 3 | Status: SHIPPED | OUTPATIENT
Start: 2022-01-20 | End: 2023-04-12

## 2022-01-20 ASSESSMENT — PATIENT HEALTH QUESTIONNAIRE - PHQ9
CLINICAL INTERPRETATION OF PHQ2 SCORE: NO FURTHER SCREENING NEEDED
SUM OF ALL RESPONSES TO PHQ9 QUESTIONS 1 AND 2: 0
2. FEELING DOWN, DEPRESSED OR HOPELESS: NOT AT ALL
1. LITTLE INTEREST OR PLEASURE IN DOING THINGS: NOT AT ALL
SUM OF ALL RESPONSES TO PHQ9 QUESTIONS 1 AND 2: 0

## 2022-01-25 ENCOUNTER — HOSPITAL ENCOUNTER (OUTPATIENT)
Dept: PHYSICAL THERAPY | Facility: CLINIC | Age: 67
Setting detail: THERAPIES SERIES
End: 2022-01-25
Attending: FAMILY MEDICINE
Payer: COMMERCIAL

## 2022-01-25 PROCEDURE — 97110 THERAPEUTIC EXERCISES: CPT | Mod: GP | Performed by: PHYSICAL THERAPIST

## 2022-01-27 ENCOUNTER — HOSPITAL ENCOUNTER (OUTPATIENT)
Dept: PHYSICAL THERAPY | Facility: CLINIC | Age: 67
Setting detail: THERAPIES SERIES
End: 2022-01-27
Attending: FAMILY MEDICINE
Payer: COMMERCIAL

## 2022-01-27 PROCEDURE — 97110 THERAPEUTIC EXERCISES: CPT | Mod: GP | Performed by: PHYSICAL THERAPIST

## 2022-01-28 DIAGNOSIS — E78.5 DYSLIPIDEMIA: ICD-10-CM

## 2022-01-28 RX ORDER — ATORVASTATIN CALCIUM 40 MG/1
TABLET, FILM COATED ORAL
Qty: 90 TABLET | Refills: 3 | Status: SHIPPED | OUTPATIENT
Start: 2022-01-28 | End: 2023-01-16

## 2022-01-28 RX ORDER — ATORVASTATIN CALCIUM 40 MG/1
TABLET, FILM COATED ORAL
Qty: 90 TABLET | Refills: 0 | OUTPATIENT
Start: 2022-01-28

## 2022-02-01 DIAGNOSIS — E03.8 SUBCLINICAL HYPOTHYROIDISM: ICD-10-CM

## 2022-02-01 RX ORDER — LEVOTHYROXINE SODIUM 75 UG/1
TABLET ORAL
Qty: 30 TABLET | Refills: 8 | Status: SHIPPED | OUTPATIENT
Start: 2022-02-01 | End: 2023-01-06

## 2022-02-01 NOTE — TELEPHONE ENCOUNTER
levothryoxine  Prescription approved per [facility] Refill Protocol.    Arabella Babin RN    
Home

## 2022-02-02 DIAGNOSIS — E03.8 SUBCLINICAL HYPOTHYROIDISM: ICD-10-CM

## 2022-02-02 RX ORDER — LEVOTHYROXINE SODIUM 75 UG/1
TABLET ORAL
Qty: 30 TABLET | Refills: 0 | OUTPATIENT
Start: 2022-02-02

## 2022-02-02 NOTE — TELEPHONE ENCOUNTER
Duplicate, RX was sent in yesterday with 8 refills.   levothyroxine (SYNTHROID/LEVOTHROID) 75 MCG tablet 30 tablet 8 2/1/2022  No   Sig: TAKE ONE TABLET BY MOUTH ONCE DAILY   Sent to pharmacy as: Levothyroxine Sodium 75 MCG Oral Tablet (SYNTHROID/LEVOTHROID)   Class: E-Prescribe   Order: [id number]   E-Prescribing Status: Receipt confirmed by pharmacy (2/1/2022  1:04 PM CST)

## 2022-02-03 ENCOUNTER — APPOINTMENT (OUTPATIENT)
Dept: OPTOMETRY | Age: 67
End: 2022-02-03

## 2022-02-03 DIAGNOSIS — E03.8 SUBCLINICAL HYPOTHYROIDISM: ICD-10-CM

## 2022-02-03 RX ORDER — LEVOTHYROXINE SODIUM 75 UG/1
TABLET ORAL
Qty: 30 TABLET | Refills: 0 | OUTPATIENT
Start: 2022-02-03

## 2022-02-03 NOTE — TELEPHONE ENCOUNTER
This prescription was filled on 02/01/2022. Confirmed with [facility] pharmacy that this has already been refilled.

## 2022-02-04 DIAGNOSIS — E03.8 SUBCLINICAL HYPOTHYROIDISM: ICD-10-CM

## 2022-02-04 RX ORDER — LEVOTHYROXINE SODIUM 75 UG/1
TABLET ORAL
Qty: 30 TABLET | Refills: 0 | OUTPATIENT
Start: 2022-02-04

## 2022-02-04 NOTE — TELEPHONE ENCOUNTER
levothyroxine  Denied, sent 2/1/2022 for 30 tablets and 8 refills to this pharmacy.    Arabella Babin RN

## 2022-02-05 DIAGNOSIS — E03.8 SUBCLINICAL HYPOTHYROIDISM: ICD-10-CM

## 2022-02-07 RX ORDER — LEVOTHYROXINE SODIUM 75 UG/1
TABLET ORAL
Qty: 30 TABLET | Refills: 0 | OUTPATIENT
Start: 2022-02-07

## 2022-02-07 NOTE — TELEPHONE ENCOUNTER
Sent 2/1/22, with 9 month supply. Should not need refills at this time    Janice Bustamante RN

## 2022-02-10 ENCOUNTER — HOSPITAL ENCOUNTER (OUTPATIENT)
Dept: PHYSICAL THERAPY | Facility: CLINIC | Age: 67
Setting detail: THERAPIES SERIES
End: 2022-02-10
Attending: FAMILY MEDICINE
Payer: MEDICARE

## 2022-02-10 PROCEDURE — 97110 THERAPEUTIC EXERCISES: CPT | Mod: GP | Performed by: PHYSICAL THERAPIST

## 2022-02-14 ENCOUNTER — OFFICE VISIT (OUTPATIENT)
Dept: CARDIOLOGY | Age: 67
End: 2022-02-14

## 2022-02-14 VITALS
DIASTOLIC BLOOD PRESSURE: 78 MMHG | WEIGHT: 220.4 LBS | SYSTOLIC BLOOD PRESSURE: 122 MMHG | BODY MASS INDEX: 35.42 KG/M2 | OXYGEN SATURATION: 98 % | HEART RATE: 73 BPM | HEIGHT: 66 IN | RESPIRATION RATE: 16 BRPM

## 2022-02-14 DIAGNOSIS — I25.10 CORONARY ARTERY DISEASE INVOLVING NATIVE CORONARY ARTERY OF NATIVE HEART WITHOUT ANGINA PECTORIS: ICD-10-CM

## 2022-02-14 DIAGNOSIS — I10 ESSENTIAL HYPERTENSION: Primary | ICD-10-CM

## 2022-02-14 PROCEDURE — 93000 ELECTROCARDIOGRAM COMPLETE: CPT | Performed by: INTERNAL MEDICINE

## 2022-02-14 PROCEDURE — 99214 OFFICE O/P EST MOD 30 MIN: CPT | Performed by: INTERNAL MEDICINE

## 2022-02-14 ASSESSMENT — PAIN SCALES - GENERAL: PAINLEVEL: 0

## 2022-02-22 DIAGNOSIS — I10 ESSENTIAL HYPERTENSION: ICD-10-CM

## 2022-02-23 RX ORDER — HYDROCHLOROTHIAZIDE 25 MG/1
TABLET ORAL
Qty: 180 TABLET | Refills: 3 | Status: SHIPPED | OUTPATIENT
Start: 2022-02-23 | End: 2023-02-11 | Stop reason: SDUPTHER

## 2022-02-28 RX ORDER — FLUTICASONE PROPIONATE 50 MCG
SPRAY, SUSPENSION (ML) NASAL
Qty: 48 G | Refills: 0 | Status: SHIPPED | OUTPATIENT
Start: 2022-02-28 | End: 2022-06-20

## 2022-03-01 RX ORDER — FLUTICASONE PROPIONATE 50 MCG
SPRAY, SUSPENSION (ML) NASAL
Qty: 48 G | Refills: 0 | OUTPATIENT
Start: 2022-03-01

## 2022-03-14 RX ORDER — OMEPRAZOLE 40 MG/1
CAPSULE, DELAYED RELEASE ORAL
Qty: 60 CAPSULE | Refills: 2 | Status: SHIPPED | OUTPATIENT
Start: 2022-03-14 | End: 2022-06-08 | Stop reason: SDUPTHER

## 2022-03-15 ENCOUNTER — HOSPITAL ENCOUNTER (OUTPATIENT)
Dept: PHYSICAL THERAPY | Facility: CLINIC | Age: 67
Setting detail: THERAPIES SERIES
Discharge: HOME | End: 2022-03-15
Attending: FAMILY MEDICINE
Payer: MEDICARE

## 2022-03-15 PROCEDURE — 97110 THERAPEUTIC EXERCISES: CPT | Mod: GP | Performed by: PHYSICAL THERAPIST

## 2022-03-15 NOTE — PROGRESS NOTES
[facility]    Outpatient Physical Therapy Discharge Note  Patient: Ashli Rogers  : 1955    Beginning/End Dates of Reporting Period:  2021 to 3/15/2022    Referring Provider: ryan Salcido Diagnosis: right TKA      Client Self Report: overall knee is feeling good able to move sit to stand on high surface but on low chair still needs UE supports , had doctor appt at 1year     Objective Measurements:  Objective Measure: pain 0-7/10 AROM 0-125 LEFS 39/80 at [facility]   Details: pain currently 0-1/10  LEFS 60/80 AROM 0-135      patient seen for 10 Rx sessions for exercises in clinic and instruction In HEP at last Rx she was completing the following   QS, SLR, TKE , hip adduction isometric , hip abduction , prone knee flexion standing heelraises and mini squatts 40x , nustep level 6 x 15 , standing TKE with gray  theraband, walking in hallway against blue band forward and backward , laterial , at llbars bosu x 5 , patellar and scar massage , leg press 44 lbs B ,33 L, R, 20x, single leg mini squatt         Goals:  Goal Identifier 1   Goal Description instruction in HEP and compliant with it 5 of 7 days to improve quality of life    Target Date 22   Date Met      Progress (detail required for progress note):  Very compliant      Goal Identifier 2   Goal Description patient to have improved tolerance to activity currently LEFS 39/80    Target Date 22   Date Met      Progress (detail required for progress note):  60/80     Goal Identifier 3   Goal Description patient to have decrease in knee pain currently 0-7/10 goal is 0-3/10    Target Date 22   Date Met      Progress (detail required for progress note):  0-1/10     Plan:  Discharge from therapy.    Discharge:    Reason for Discharge: Patient has met all goals.    Equipment Issued: none    Discharge Plan: Patient to continue home  program.

## 2022-03-25 RX ORDER — LOSARTAN POTASSIUM 100 MG/1
100 TABLET ORAL DAILY
Qty: 90 TABLET | Refills: 3 | Status: SHIPPED | OUTPATIENT
Start: 2022-03-25 | End: 2023-03-17

## 2022-04-18 ENCOUNTER — APPOINTMENT (OUTPATIENT)
Dept: OPTOMETRY | Age: 67
End: 2022-04-18

## 2022-05-03 DIAGNOSIS — G47.33 OBSTRUCTIVE SLEEP APNEA (ADULT) (PEDIATRIC): Primary | ICD-10-CM

## 2022-05-10 DIAGNOSIS — E78.5 HYPERLIPIDEMIA LDL GOAL <130: ICD-10-CM

## 2022-05-11 RX ORDER — SIMVASTATIN 10 MG
TABLET ORAL
Qty: 90 TABLET | Refills: 3 | Status: SHIPPED | OUTPATIENT
Start: 2022-05-11 | End: 2023-05-30

## 2022-05-11 NOTE — TELEPHONE ENCOUNTER
Prescription approved per [facility] Refill Protocol.    Bro Marmolejo RN

## 2022-05-16 ENCOUNTER — IMMUNIZATION (OUTPATIENT)
Dept: FAMILY MEDICINE | Facility: CLINIC | Age: 67
End: 2022-05-16
Payer: COMMERCIAL

## 2022-05-16 PROCEDURE — 0064A COVID-19,PF,MODERNA (18+ YRS BOOSTER .25ML): CPT

## 2022-05-16 PROCEDURE — 91306 COVID-19,PF,MODERNA (18+ YRS BOOSTER .25ML): CPT

## 2022-05-27 ENCOUNTER — OFFICE VISIT (OUTPATIENT)
Dept: INTERNAL MEDICINE | Facility: CLINIC | Age: 67
End: 2022-05-27
Payer: COMMERCIAL

## 2022-05-27 VITALS
HEART RATE: 84 BPM | RESPIRATION RATE: 16 BRPM | SYSTOLIC BLOOD PRESSURE: 128 MMHG | WEIGHT: 293 LBS | DIASTOLIC BLOOD PRESSURE: 84 MMHG | OXYGEN SATURATION: 98 % | HEIGHT: 71 IN | TEMPERATURE: 97.2 F | BODY MASS INDEX: 41.02 KG/M2

## 2022-05-27 DIAGNOSIS — E66.01 MORBID OBESITY (H): ICD-10-CM

## 2022-05-27 DIAGNOSIS — M21.612 BUNION, LEFT: ICD-10-CM

## 2022-05-27 DIAGNOSIS — Z12.31 ENCOUNTER FOR SCREENING MAMMOGRAM FOR BREAST CANCER: ICD-10-CM

## 2022-05-27 DIAGNOSIS — G47.33 OSA (OBSTRUCTIVE SLEEP APNEA): ICD-10-CM

## 2022-05-27 DIAGNOSIS — E78.5 HYPERLIPIDEMIA LDL GOAL <130: ICD-10-CM

## 2022-05-27 DIAGNOSIS — Z00.00 MEDICARE ANNUAL WELLNESS VISIT, SUBSEQUENT: Primary | ICD-10-CM

## 2022-05-27 DIAGNOSIS — Z78.0 POST-MENOPAUSAL: ICD-10-CM

## 2022-05-27 DIAGNOSIS — E03.4 HYPOTHYROIDISM DUE TO ACQUIRED ATROPHY OF THYROID: ICD-10-CM

## 2022-05-27 DIAGNOSIS — K21.9 GASTROESOPHAGEAL REFLUX DISEASE WITHOUT ESOPHAGITIS: ICD-10-CM

## 2022-05-27 LAB
ALBUMIN SERPL-MCNC: 3.9 G/DL (ref 3.4–5)
ALP SERPL-CCNC: 95 U/L (ref 40–150)
ALT SERPL W P-5'-P-CCNC: 32 U/L (ref 0–50)
ANION GAP SERPL CALCULATED.3IONS-SCNC: 2 MMOL/L (ref 3–14)
AST SERPL W P-5'-P-CCNC: 18 U/L (ref 0–45)
BILIRUB SERPL-MCNC: 0.3 MG/DL (ref 0.2–1.3)
BUN SERPL-MCNC: 12 MG/DL (ref 7–30)
CALCIUM SERPL-MCNC: 8.9 MG/DL (ref 8.5–10.1)
CHLORIDE BLD-SCNC: 110 MMOL/L (ref 94–109)
CHOLEST SERPL-MCNC: 201 MG/DL
CO2 SERPL-SCNC: 30 MMOL/L (ref 20–32)
CREAT SERPL-MCNC: 0.72 MG/DL (ref 0.52–1.04)
FASTING STATUS PATIENT QL REPORTED: YES
GFR SERPL CREATININE-BSD FRML MDRD: 90 ML/MIN/1.73M2
GLUCOSE BLD-MCNC: 110 MG/DL (ref 70–99)
HDLC SERPL-MCNC: 67 MG/DL
LDLC SERPL CALC-MCNC: 112 MG/DL
NONHDLC SERPL-MCNC: 134 MG/DL
POTASSIUM BLD-SCNC: 4.8 MMOL/L (ref 3.4–5.3)
PROT SERPL-MCNC: 8.1 G/DL (ref 6.8–8.8)
SODIUM SERPL-SCNC: 142 MMOL/L (ref 133–144)
TRIGL SERPL-MCNC: 108 MG/DL
TSH SERPL DL<=0.005 MIU/L-ACNC: 3.27 MU/L (ref 0.4–4)

## 2022-05-27 PROCEDURE — 36415 COLL VENOUS BLD VENIPUNCTURE: CPT | Performed by: INTERNAL MEDICINE

## 2022-05-27 PROCEDURE — 84443 ASSAY THYROID STIM HORMONE: CPT | Performed by: INTERNAL MEDICINE

## 2022-05-27 PROCEDURE — 80061 LIPID PANEL: CPT | Performed by: INTERNAL MEDICINE

## 2022-05-27 PROCEDURE — 80053 COMPREHEN METABOLIC PANEL: CPT | Performed by: INTERNAL MEDICINE

## 2022-05-27 PROCEDURE — 99397 PER PM REEVAL EST PAT 65+ YR: CPT | Performed by: INTERNAL MEDICINE

## 2022-05-27 PROCEDURE — 99213 OFFICE O/P EST LOW 20 MIN: CPT | Mod: 25 | Performed by: INTERNAL MEDICINE

## 2022-05-27 ASSESSMENT — ENCOUNTER SYMPTOMS
DIARRHEA: 0
CHILLS: 0
HEMATURIA: 0
NERVOUS/ANXIOUS: 0
SHORTNESS OF BREATH: 0
PARESTHESIAS: 0
HEADACHES: 0
FEVER: 0
FREQUENCY: 1
PALPITATIONS: 0
MYALGIAS: 1
WEAKNESS: 0
SORE THROAT: 0
ABDOMINAL PAIN: 0
EYE PAIN: 0
DIZZINESS: 0
JOINT SWELLING: 1
DYSURIA: 0
HEARTBURN: 1
ARTHRALGIAS: 1
CONSTIPATION: 0
NAUSEA: 0
BREAST MASS: 0
COUGH: 0
HEMATOCHEZIA: 0

## 2022-05-27 ASSESSMENT — ACTIVITIES OF DAILY LIVING (ADL): CURRENT_FUNCTION: NO ASSISTANCE NEEDED

## 2022-06-08 ENCOUNTER — E-ADVICE (OUTPATIENT)
Dept: GASTROENTEROLOGY | Age: 67
End: 2022-06-08

## 2022-06-08 RX ORDER — OMEPRAZOLE 40 MG/1
40 CAPSULE, DELAYED RELEASE ORAL 2 TIMES DAILY
Qty: 180 CAPSULE | Refills: 0 | Status: SHIPPED | OUTPATIENT
Start: 2022-06-08 | End: 2022-06-13 | Stop reason: SDUPTHER

## 2022-06-11 ASSESSMENT — ENCOUNTER SYMPTOMS
HEADACHES: 0
SORE THROAT: 0
ADENOPATHY: 0
NERVOUS/ANXIOUS: 0
CHEST TIGHTNESS: 0
VOMITING: 0
ABDOMINAL PAIN: 0
WHEEZING: 0
SLEEP DISTURBANCE: 0
FACIAL SWELLING: 0
DIARRHEA: 0
FEVER: 0
APPETITE CHANGE: 0

## 2022-06-13 ENCOUNTER — OFFICE VISIT (OUTPATIENT)
Dept: GASTROENTEROLOGY | Age: 67
End: 2022-06-13

## 2022-06-13 VITALS — HEIGHT: 67 IN | BODY MASS INDEX: 34.06 KG/M2 | WEIGHT: 217 LBS

## 2022-06-13 DIAGNOSIS — Z86.010 PERSONAL HISTORY OF COLONIC POLYPS: Primary | ICD-10-CM

## 2022-06-13 DIAGNOSIS — K21.9 GASTROESOPHAGEAL REFLUX DISEASE, UNSPECIFIED WHETHER ESOPHAGITIS PRESENT: ICD-10-CM

## 2022-06-13 PROCEDURE — 99214 OFFICE O/P EST MOD 30 MIN: CPT | Performed by: INTERNAL MEDICINE

## 2022-06-13 RX ORDER — OMEPRAZOLE 40 MG/1
40 CAPSULE, DELAYED RELEASE ORAL 2 TIMES DAILY
Qty: 180 CAPSULE | Refills: 3 | Status: SHIPPED | OUTPATIENT
Start: 2022-06-13

## 2022-06-15 ENCOUNTER — OFFICE VISIT (OUTPATIENT)
Dept: ALLERGY | Age: 67
End: 2022-06-15

## 2022-06-15 VITALS
TEMPERATURE: 97.5 F | OXYGEN SATURATION: 99 % | DIASTOLIC BLOOD PRESSURE: 80 MMHG | SYSTOLIC BLOOD PRESSURE: 140 MMHG | HEART RATE: 67 BPM

## 2022-06-15 DIAGNOSIS — J31.0 MIXED RHINITIS: ICD-10-CM

## 2022-06-15 DIAGNOSIS — K21.9 GASTROESOPHAGEAL REFLUX DISEASE, UNSPECIFIED WHETHER ESOPHAGITIS PRESENT: ICD-10-CM

## 2022-06-15 DIAGNOSIS — J45.30 MILD PERSISTENT ALLERGIC ASTHMA: Primary | ICD-10-CM

## 2022-06-15 PROCEDURE — 99214 OFFICE O/P EST MOD 30 MIN: CPT | Performed by: ALLERGY & IMMUNOLOGY

## 2022-06-15 RX ORDER — MONTELUKAST SODIUM 10 MG/1
10 TABLET ORAL NIGHTLY
Qty: 90 TABLET | Refills: 3 | Status: SHIPPED | OUTPATIENT
Start: 2022-06-15 | End: 2023-07-18 | Stop reason: SDUPTHER

## 2022-06-20 RX ORDER — FLUTICASONE PROPIONATE 50 MCG
SPRAY, SUSPENSION (ML) NASAL
Qty: 48 G | Refills: 1 | Status: SHIPPED | OUTPATIENT
Start: 2022-06-20 | End: 2023-04-10

## 2022-06-22 ENCOUNTER — OFFICE VISIT (OUTPATIENT)
Dept: RHEUMATOLOGY | Age: 67
End: 2022-06-22

## 2022-06-22 VITALS
DIASTOLIC BLOOD PRESSURE: 72 MMHG | HEART RATE: 83 BPM | WEIGHT: 218 LBS | BODY MASS INDEX: 34.66 KG/M2 | SYSTOLIC BLOOD PRESSURE: 132 MMHG

## 2022-06-22 DIAGNOSIS — M25.572 LEFT ANKLE PAIN, UNSPECIFIED CHRONICITY: Primary | ICD-10-CM

## 2022-06-22 DIAGNOSIS — M19.042 PRIMARY OSTEOARTHRITIS OF BOTH HANDS: ICD-10-CM

## 2022-06-22 DIAGNOSIS — M19.041 PRIMARY OSTEOARTHRITIS OF BOTH HANDS: ICD-10-CM

## 2022-06-22 DIAGNOSIS — M17.0 PRIMARY OSTEOARTHRITIS OF BOTH KNEES: ICD-10-CM

## 2022-06-22 PROCEDURE — 99204 OFFICE O/P NEW MOD 45 MIN: CPT | Performed by: INTERNAL MEDICINE

## 2022-07-05 ENCOUNTER — ANCILLARY PROCEDURE (OUTPATIENT)
Dept: GENERAL RADIOLOGY | Facility: CLINIC | Age: 67
End: 2022-07-05
Attending: PODIATRIST
Payer: COMMERCIAL

## 2022-07-05 ENCOUNTER — OFFICE VISIT (OUTPATIENT)
Dept: PODIATRY | Facility: CLINIC | Age: 67
End: 2022-07-05
Attending: INTERNAL MEDICINE

## 2022-07-05 VITALS
SYSTOLIC BLOOD PRESSURE: 134 MMHG | BODY MASS INDEX: 41.02 KG/M2 | WEIGHT: 293 LBS | DIASTOLIC BLOOD PRESSURE: 78 MMHG | HEIGHT: 71 IN

## 2022-07-05 DIAGNOSIS — M20.10 HALLUX VALGUS: ICD-10-CM

## 2022-07-05 DIAGNOSIS — M20.12 HALLUX VALGUS, LEFT: Primary | ICD-10-CM

## 2022-07-05 PROCEDURE — 99213 OFFICE O/P EST LOW 20 MIN: CPT | Performed by: PODIATRIST

## 2022-07-05 PROCEDURE — 73630 X-RAY EXAM OF FOOT: CPT | Mod: TC | Performed by: RADIOLOGY

## 2022-07-05 ASSESSMENT — PAIN SCALES - GENERAL: PAINLEVEL: MILD PAIN (2)

## 2022-07-05 NOTE — PATIENT INSTRUCTIONS
Reliable shoe stores: To maximize your experience and provide the best possible fit.  Be sure to show them your foot concerns and tell them Dr. Jang sent you.      Stores listed in bold have only athletic shoes, and stores that are not bold are mostly casual or variety of shoes    [employer]  [address] [place] [address]  [phone]    [employer]  [address] [place] [address]  [phone]     [employer] [place]  [address] [place] [address]  [phone]    [employer]  [address] [facility]  [facility] [address]  [phone]    [employer]  [address] [place], [place] [address] [phone]    [employer]  [address] [place] [address]  [phone]                         [employer] Locations:     [address] [place] [address]   [phone]     [address] [place] [address] [address] [place] [address]   [phone]     [address] [place] [address]   [phone]     [address] [facility] [place] [address]   [phone]     [address] [place] [address]   [phone]     [address] [address]   [phone]     [address]   [place] [address]   [phone]     [address] [facility]   [place] [address]   [phone]             How to find reasonable shoes          The correct width    Correct Fitting    Correct Length      Foot Distortion    Posture Distortion                          Torsional Rigidity      Grasp behind the heel and underneath the foot and twist      Bad    Excessive torsion/twist in midfoot     Less torsion/twist in midfoot is better                   Heel Counter Rigidity      Grasp just above   midsole and squeeze      Bad    Soft heel counter      Good    Rigid Heel Counter      Flexion Rigidity      Grasp shoe and bend from forefoot to rearfoot

## 2022-07-05 NOTE — PROGRESS NOTES
Chief Complaint   Patient presents with     RECHECK     Left > Right bunion; XR B/L foot 7/5/2022; LOV 8/5/2019     Consult     Discuss surgery on left bunion     HPI:  Ashli Rogers is a 64 year old female who is seen in consultation at the request of self.    Pt presents for eval of:   (Onset, Location, L/R, Character, Treatments, Injury if yes)    XR Left foot today 8/5/2019     Ongoing, medial Left bunion.  It hurts sometimes after walking any significant distance.  They are somewhat larger now than a few years ago and more bothersome now.  Is getting harder to accommodate them.  She notices bump pain and stiffness crepitus aching throughout when walking a mile or more.  She is had both knees replaced in the right hip.    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  Active with home life and raises horses.    ROS:  10 point ROS neg other than the symptoms noted above in the HPI.    Patient Active Problem List   Diagnosis     S/P total hip arthroplasty     Varicose veins of lower extremities with complications     Allergic rhinitis     DJD (degenerative joint disease) of knee     Advanced directives, counseling/discussion     ADD (attention deficit disorder)     Gastroesophageal reflux disease, esophagitis presence not specified     Morbid obesity (H)     Hiatal hernia     Diverticulitis of colon     Bunion, left     Pain due to varicose veins of both lower extremities     ZELDA (obstructive sleep apnea)     Hyperlipidemia LDL goal <130     Arthritis of knee, left       PAST MEDICAL HISTORY:   Past Medical History:   Diagnosis Date     Cataracts, bilateral      Closed fracture of medial malleolus     Distal avulsion fracture of the medial malleoli     Diverticulitis of colon 2/3/2017     DJD (degenerative joint disease) 05/24/2011    right knee, s/p injections     Erythema nodosum      Generalized osteoarthrosis, unspecified site     Hips     S/P total hip arthroplasty 04/04/2003     right     S/P total knee arthroplasty     left     Sleep apnea     on CPAP        PAST SURGICAL HISTORY:   Past Surgical History:   Procedure Laterality Date     COLONOSCOPY N/A 05/11/2016    normal, repeat 10 years     ESOPHAGOSCOPY, GASTROSCOPY, DUODENOSCOPY (EGD), COMBINED N/A 05/11/2016    Procedure: COMBINED ESOPHAGOSCOPY, GASTROSCOPY, DUODENOSCOPY (EGD), BIOPSY SINGLE OR MULTIPLE;  Surgeon: Ervin Johnston MD;  Location: [facility]     FINGER SURGERY Right 04/2019    ganglion cyst removed     PHACOEMULSIFICATION WITH STANDARD INTRAOCULAR LENS IMPLANT Left 10/7/2021    Procedure: PHACOEMULSIFICATION, CATARACT, WITH MULTIFOCAL INTRAOCULAR LENS IMPLANT INSERTION, left;  Surgeon: Jordan Collins MD;  Location: [facility]     PHACOEMULSIFICATION WITH STANDARD INTRAOCULAR LENS IMPLANT Right 10/21/2021    Procedure: PHACOEMULSIFICATION, CATARACT, WITH MULTIFOCAL INTRAOCULAR LENS IMPLANT INSERTION,right;  Surgeon: Praveen Guy MD;  Location: [facility]     ZZC TOTAL HIP ARTHROPLASTY Right 04/26/2004    Hip Replacement, Total     ZZC TOTAL KNEE ARTHROPLASTY Left 2013     [facility]  01/11/2006        MEDICATIONS:   Current Outpatient Medications:      CALCIUM-MAGNESIUM-ZINC PO, Take 1 tablet by mouth daily Reported on 3/28/2017, Disp: , Rfl:      Cholecalciferol (VITAMIN D3 PO), Take by mouth daily, Disp: , Rfl:      IBUPROFEN PO, , Disp: , Rfl:      levothyroxine (SYNTHROID/LEVOTHROID) 75 MCG tablet, TAKE ONE TABLET BY MOUTH ONCE DAILY, Disp: 30 tablet, Rfl: 8     NEW MED, CBD oil that rolls on her knee, Disp: , Rfl:      Omega-3 Fatty Acids (FISH OIL PO), , Disp: , Rfl:      omeprazole (PRILOSEC OTC) 20 MG EC tablet, Take 1 tablet (20 mg) by mouth daily (Patient taking differently: Take 20 mg by mouth as needed), Disp: 90 tablet, Rfl: 1     simvastatin (ZOCOR) 10 MG tablet, TAKE ONE TABLET BY MOUTH AT BEDTIME, Disp: 90 tablet, Rfl: 3     ZYRTEC 10 MG OR TABS, ONE TABLET DAILY, Disp: 30,  Rfl: 8     ALLERGIES:    Allergies   Allergen Reactions     Cephalexin Monohydrate      keflex     Gluten Meal GI Disturbance     Warfarin Sodium      coumadin        SOCIAL HISTORY:   Social History     Socioeconomic History     Marital status:      Spouse name: Osbaldo     Number of children: 2     Years of education: Not on file     Highest education level: Not on file   Occupational History     Occupation: retired     Comment: speech pathologist   Tobacco Use     Smoking status: Never Smoker     Smokeless tobacco: Never Used   Vaping Use     Vaping Use: Never used   Substance and Sexual Activity     Alcohol use: Yes     Comment: 2 drinks per month     Drug use: No     Sexual activity: Yes     Partners: Male   Other Topics Concern      Service Not Asked     Blood Transfusions Not Asked     Caffeine Concern Not Asked     Occupational Exposure Not Asked     Hobby Hazards Not Asked     Sleep Concern Not Asked     Stress Concern Not Asked     Weight Concern Yes     Comment: going to lose 100 pounds     Special Diet Not Asked     Back Care Not Asked     Exercise Not Asked     Bike Helmet Not Asked     Seat Belt Not Asked     Self-Exams Not Asked     Parent/sibling w/ CABG, MI or angioplasty before 65F 55M? Not Asked   Social History Narrative    Lives at home with , 2 horses. 2 grown children. Son lives in [place], Dtr in [place]     Social Determinants of Health     Financial Resource Strain: Not on file   Food Insecurity: Not on file   Transportation Needs: Not on file   Physical Activity: Not on file   Stress: Not on file   Social Connections: Not on file   Intimate Partner Violence: Not on file   Housing Stability: Not on file        FAMILY HISTORY:   Family History   Problem Relation Age of Onset     Cancer Father          of lung cancer     Heart Disease Daughter         2 holes in her heart        EXAM:Vitals: /78 (BP Location: Left arm, Patient Position: Sitting, Cuff Size:  "Adult Large)   Ht 1.81 m (5' 11.25\")   Wt 143.3 kg (316 lb)   LMP 01/19/2005   BMI 43.76 kg/m    BMI= Body mass index is 43.76 kg/m .    General appearance: Patient is alert and fully cooperative with history & exam.  No sign of distress is noted during the visit.     Psychiatric: Affect is pleasant & appropriate.  Patient appears motivated to improve health.     Respiratory: Breathing is regular & unlabored while sitting.     HEENT: Hearing is intact to spoken word.  Speech is clear.  No gross evidence of visual impairment that would impact ambulation.     Vascular: DP & PT pulses are intact & regular bilaterally.  No significant edema or varicosities noted.  CFT and skin temperature is normal to both lower extremities.     Neurologic: Lower extremity sensation is intact to light touch.  No evidence of weakness or contracture in the lower extremities.  No evidence of neuropathy.    Dermatologic: Skin is intact to both lower extremities with adequate texture, turgor and tone about the integument.  No paronychia or evidence of soft tissue infection is noted.     Musculoskeletal: Patient is ambulatory without assistive device or brace.  Multiple varicosities are noted bilateral.  Subtle pitting edema.  Moderate bunion deformities noted bilateral left greater than right.  Approximately 20 or 30 degrees total range of motion with crepitus and tracking throughout.  Her deformity is not fully reducible.  Erythema surrounding the prominent medial eminence.    Radiographs demonstrate elevated intermetatarsal angle HAV angle with narrowing of the first MPJ joint space surrounding sclerotic changes.  Elevated intermetatarsal angle HAV angle and destructive changes throughout the first MPJ.  Osteophytes noted about the lateral joint and dorsally as well.  Also sclerotic changes and osteophyte noted about the anterior right ankle.    ASSESSMENT:       ICD-10-CM    1. Hallux valgus, left  M20.12         PLAN:  Reviewed " patient's chart in epic.      8/5/2019   compression therapy recommended to reduce venous disease.    She notes that she has compression stockings.  We discussed typical life replacement is about every 6 months.  She notes she has f/u with Paramjit to further discuss other options regarding her varicosities.  recommend weight loss.  Motivational interviewing today regarding how obesity contributes to her symptoms.  Discussed orthotics and shoes might reduce leg fatigue some  Also discussed surgical treatment   Interpreted radiographs.  Surgical treatment would likely include arthrodesis of the first MPJ.  We discussed potential risks and complications and postoperative cares regarding this.  After discussion of this she wishes to continue to pursue compression modifications in shoe gear arch supports and if this remains problematic for her she will return to the clinic for reevaluation and likely surgical planning.  All questions were answered to her satisfaction.  She agrees with this treatment plan.    Discussed that her bunion hallux valgus deformity may be contributing to some fatigue throughout her legs.  Edema and varicosities likely contribute to this significantly as does obesity and overall conditioning.      7/5/2022    Discussed treatment option.     Obtained and interpreted radiographs  Discussed treatment options including accommodation injections arch support oral anti-inflammatories padding and splinting and arthrodesis of the joint.  Discussed the postoperative cares and expectations following the procedure.  All questions were answered.  She will consider this and exhaust conservative options and will follow-up as needed.      Jordan Jang DPM

## 2022-07-05 NOTE — LETTER
7/5/2022         RE: Ashli Rogers  [address]  [facility] [address]        Dear Colleague,    Thank you for referring your patient, Ashli Rogers, to the [facility]. Please see a copy of my visit note below.    Chief Complaint   Patient presents with     RECHECK     Left > Right bunion; XR B/L foot 7/5/2022; LOV 8/5/2019     Consult     Discuss surgery on left bunion     HPI:  Ashli Rogers is a 64 year old female who is seen in consultation at the request of self.    Pt presents for eval of:   (Onset, Location, L/R, Character, Treatments, Injury if yes)    XR Left foot today 8/5/2019     Ongoing, medial Left bunion.  It hurts sometimes after walking any significant distance.  They are somewhat larger now than a few years ago and more bothersome now.  Is getting harder to accommodate them.  She notices bump pain and stiffness crepitus aching throughout when walking a mile or more.  She is had both knees replaced in the right hip.    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  Active with home life and raises horses.    ROS:  10 point ROS neg other than the symptoms noted above in the HPI.    Patient Active Problem List   Diagnosis     S/P total hip arthroplasty     Varicose veins of lower extremities with complications     Allergic rhinitis     DJD (degenerative joint disease) of knee     Advanced directives, counseling/discussion     ADD (attention deficit disorder)     Gastroesophageal reflux disease, esophagitis presence not specified     Morbid obesity (H)     Hiatal hernia     Diverticulitis of colon     Bunion, left     Pain due to varicose veins of both lower extremities     ZELDA (obstructive sleep apnea)     Hyperlipidemia LDL goal <130     Arthritis of knee, left       PAST MEDICAL HISTORY:   Past Medical History:   Diagnosis Date     Cataracts, bilateral      Closed fracture of medial malleolus     Distal avulsion fracture of the medial malleoli      Diverticulitis of colon 2/3/2017     DJD (degenerative joint disease) 05/24/2011    right knee, s/p injections     Erythema nodosum      Generalized osteoarthrosis, unspecified site     Hips     S/P total hip arthroplasty 04/04/2003    right     S/P total knee arthroplasty     left     Sleep apnea     on CPAP        PAST SURGICAL HISTORY:   Past Surgical History:   Procedure Laterality Date     COLONOSCOPY N/A 05/11/2016    normal, repeat 10 years     ESOPHAGOSCOPY, GASTROSCOPY, DUODENOSCOPY (EGD), COMBINED N/A 05/11/2016    Procedure: COMBINED ESOPHAGOSCOPY, GASTROSCOPY, DUODENOSCOPY (EGD), BIOPSY SINGLE OR MULTIPLE;  Surgeon: Ervin Johnston MD;  [facility]     FINGER SURGERY Right 04/2019    ganglion cyst removed     PHACOEMULSIFICATION WITH STANDARD INTRAOCULAR LENS IMPLANT Left 10/7/2021    Procedure: PHACOEMULSIFICATION, CATARACT, WITH MULTIFOCAL INTRAOCULAR LENS IMPLANT INSERTION, left;  Surgeon: Jordan Collins MD;  Location: [facility]     PHACOEMULSIFICATION WITH STANDARD INTRAOCULAR LENS IMPLANT Right 10/21/2021    Procedure: PHACOEMULSIFICATION, CATARACT, WITH MULTIFOCAL INTRAOCULAR LENS IMPLANT INSERTION,right;  Surgeon: Praveen Guy MD;  Location: [facility]     [facility] TOTAL HIP ARTHROPLASTY Right 04/26/2004    Hip Replacement, Total     [facility] TOTAL KNEE ARTHROPLASTY Left 2013     [facility]  [facility]        MEDICATIONS:   Current Outpatient Medications:      CALCIUM-MAGNESIUM-ZINC PO, Take 1 tablet by mouth daily Reported on 3/28/2017, Disp: , Rfl:      Cholecalciferol (VITAMIN D3 PO), Take by mouth daily, Disp: , Rfl:      IBUPROFEN PO, , Disp: , Rfl:      levothyroxine (SYNTHROID/LEVOTHROID) 75 MCG tablet, TAKE ONE TABLET BY MOUTH ONCE DAILY, Disp: 30 tablet, Rfl: 8     NEW MED, CBD oil that rolls on her knee, Disp: , Rfl:      Omega-3 Fatty Acids (FISH OIL PO), , Disp: , Rfl:      omeprazole (PRILOSEC OTC) 20 MG EC tablet, Take 1 tablet (20 mg) by mouth daily  (Patient taking differently: Take 20 mg by mouth as needed), Disp: 90 tablet, Rfl: 1     simvastatin (ZOCOR) 10 MG tablet, TAKE ONE TABLET BY MOUTH AT BEDTIME, Disp: 90 tablet, Rfl: 3     ZYRTEC 10 MG OR TABS, ONE TABLET DAILY, Disp: 30, Rfl: 8     ALLERGIES:    Allergies   Allergen Reactions     Cephalexin Monohydrate      keflex     Gluten Meal GI Disturbance     Warfarin Sodium      coumadin        SOCIAL HISTORY:   Social History     Socioeconomic History     Marital status:      Spouse name: Osbaldo     Number of children: 2     Years of education: Not on file     Highest education level: Not on file   Occupational History     Occupation: retired     Comment: speech pathologist   Tobacco Use     Smoking status: Never Smoker     Smokeless tobacco: Never Used   Vaping Use     Vaping Use: Never used   Substance and Sexual Activity     Alcohol use: Yes     Comment: 2 drinks per month     Drug use: No     Sexual activity: Yes     Partners: Male   Other Topics Concern      Service Not Asked     Blood Transfusions Not Asked     Caffeine Concern Not Asked     Occupational Exposure Not Asked     Hobby Hazards Not Asked     Sleep Concern Not Asked     Stress Concern Not Asked     Weight Concern Yes     Comment: going to lose 100 pounds     Special Diet Not Asked     Back Care Not Asked     Exercise Not Asked     Bike Helmet Not Asked     Seat Belt Not Asked     Self-Exams Not Asked     Parent/sibling w/ CABG, MI or angioplasty before 65F 55M? Not Asked   Social History Narrative    Lives at home with , 2 horses. 2 grown children. Son lives in [place], Dtr in [place]     Social Determinants of Health     Financial Resource Strain: Not on file   Food Insecurity: Not on file   Transportation Needs: Not on file   Physical Activity: Not on file   Stress: Not on file   Social Connections: Not on file   Intimate Partner Violence: Not on file   Housing Stability: Not on file        FAMILY HISTORY:  "  Family History   Problem Relation Age of Onset     Cancer Father          of lung cancer     Heart Disease Daughter         2 holes in her heart        EXAM:Vitals: /78 (BP Location: Left arm, Patient Position: Sitting, Cuff Size: Adult Large)   Ht 1.81 m (5' 11.25\")   Wt 143.3 kg (316 lb)   LMP 2005   BMI 43.76 kg/m    BMI= Body mass index is 43.76 kg/m .    General appearance: Patient is alert and fully cooperative with history & exam.  No sign of distress is noted during the visit.     Psychiatric: Affect is pleasant & appropriate.  Patient appears motivated to improve health.     Respiratory: Breathing is regular & unlabored while sitting.     HEENT: Hearing is intact to spoken word.  Speech is clear.  No gross evidence of visual impairment that would impact ambulation.     Vascular: DP & PT pulses are intact & regular bilaterally.  No significant edema or varicosities noted.  CFT and skin temperature is normal to both lower extremities.     Neurologic: Lower extremity sensation is intact to light touch.  No evidence of weakness or contracture in the lower extremities.  No evidence of neuropathy.    Dermatologic: Skin is intact to both lower extremities with adequate texture, turgor and tone about the integument.  No paronychia or evidence of soft tissue infection is noted.     Musculoskeletal: Patient is ambulatory without assistive device or brace.  Multiple varicosities are noted bilateral.  Subtle pitting edema.  Moderate bunion deformities noted bilateral left greater than right.  Approximately 20 or 30 degrees total range of motion with crepitus and tracking throughout.  Her deformity is not fully reducible.  Erythema surrounding the prominent medial eminence.    Radiographs demonstrate elevated intermetatarsal angle HAV angle with narrowing of the first MPJ joint space surrounding sclerotic changes.  Elevated intermetatarsal angle HAV angle and destructive changes throughout the " first MPJ.  Osteophytes noted about the lateral joint and dorsally as well.  Also sclerotic changes and osteophyte noted about the anterior right ankle.    ASSESSMENT:       ICD-10-CM    1. Hallux valgus, left  M20.12         PLAN:  Reviewed patient's chart in [facility].      8/5/2019   compression therapy recommended to reduce venous disease.    She notes that she has compression stockings.  We discussed typical life replacement is about every 6 months.  She notes she has f/u with Paramjit to further discuss other options regarding her varicosities.  recommend weight loss.  Motivational interviewing today regarding how obesity contributes to her symptoms.  Discussed orthotics and shoes might reduce leg fatigue some  Also discussed surgical treatment   Interpreted radiographs.  Surgical treatment would likely include arthrodesis of the first MPJ.  We discussed potential risks and complications and postoperative cares regarding this.  After discussion of this she wishes to continue to pursue compression modifications in shoe gear arch supports and if this remains problematic for her she will return to the clinic for reevaluation and likely surgical planning.  All questions were answered to her satisfaction.  She agrees with this treatment plan.    Discussed that her bunion hallux valgus deformity may be contributing to some fatigue throughout her legs.  Edema and varicosities likely contribute to this significantly as does obesity and overall conditioning.      7/5/2022    Discussed treatment option.     Obtained and interpreted radiographs  Discussed treatment options including accommodation injections arch support oral anti-inflammatories padding and splinting and arthrodesis of the joint.  Discussed the postoperative cares and expectations following the procedure.  All questions were answered.  She will consider this and exhaust conservative options and will follow-up as needed.      Jordan Jang,  REINIER            Again, thank you for allowing me to participate in the care of your patient.        Sincerely,        Jordan Jang DPM

## 2022-07-06 ENCOUNTER — HOSPITAL ENCOUNTER (OUTPATIENT)
Dept: MAMMOGRAPHY | Facility: CLINIC | Age: 67
Discharge: HOME | End: 2022-07-06
Attending: INTERNAL MEDICINE | Admitting: INTERNAL MEDICINE
Payer: MEDICARE

## 2022-07-06 DIAGNOSIS — Z12.31 ENCOUNTER FOR SCREENING MAMMOGRAM FOR BREAST CANCER: ICD-10-CM

## 2022-07-06 PROCEDURE — 77067 SCR MAMMO BI INCL CAD: CPT

## 2022-07-12 ENCOUNTER — OFFICE VISIT (OUTPATIENT)
Dept: PODIATRY | Age: 67
End: 2022-07-12

## 2022-07-12 ENCOUNTER — LAB SERVICES (OUTPATIENT)
Dept: LAB | Age: 67
End: 2022-07-12

## 2022-07-12 DIAGNOSIS — B35.1 DERMATOPHYTOSIS OF NAIL: ICD-10-CM

## 2022-07-12 DIAGNOSIS — B35.1 DERMATOPHYTOSIS OF NAIL: Primary | ICD-10-CM

## 2022-07-12 LAB
ALBUMIN SERPL BCG-MCNC: 4.4 G/DL (ref 3.6–5.1)
ALP SERPL-CCNC: 103 U/L (ref 45–130)
ALT SERPL W/O P-5'-P-CCNC: 30 U/L (ref 7–34)
AST SERPL-CCNC: 29 U/L (ref 9–37)
BILIRUB DIRECT SERPL-MCNC: 0.2 MG/DL (ref 0–0.2)
BILIRUB SERPL-MCNC: 1 MG/DL (ref 0–1)
PROT SERPL-MCNC: 6.6 G/DL (ref 6.2–8.1)

## 2022-07-12 PROCEDURE — 36415 COLL VENOUS BLD VENIPUNCTURE: CPT | Performed by: PODIATRIST

## 2022-07-12 PROCEDURE — 80076 HEPATIC FUNCTION PANEL: CPT | Performed by: PODIATRIST

## 2022-07-12 PROCEDURE — 99203 OFFICE O/P NEW LOW 30 MIN: CPT | Performed by: PODIATRIST

## 2022-07-12 RX ORDER — TERBINAFINE HYDROCHLORIDE 250 MG/1
250 TABLET ORAL DAILY
Qty: 90 TABLET | Refills: 0 | Status: SHIPPED | OUTPATIENT
Start: 2022-07-12 | End: 2022-11-28 | Stop reason: ALTCHOICE

## 2022-07-14 ENCOUNTER — E-ADVICE (OUTPATIENT)
Dept: PODIATRY | Age: 67
End: 2022-07-14

## 2022-07-18 ASSESSMENT — PATIENT HEALTH QUESTIONNAIRE - PHQ9
CLINICAL INTERPRETATION OF PHQ2 SCORE: NO FURTHER SCREENING NEEDED
2. FEELING DOWN, DEPRESSED OR HOPELESS: NOT AT ALL
CLINICAL INTERPRETATION OF PHQ2 SCORE: 0
SUM OF ALL RESPONSES TO PHQ9 QUESTIONS 1 AND 2: 0
1. LITTLE INTEREST OR PLEASURE IN DOING THINGS: NOT AT ALL

## 2022-07-23 ENCOUNTER — LAB SERVICES (OUTPATIENT)
Dept: LAB | Age: 67
End: 2022-07-23

## 2022-07-23 DIAGNOSIS — E03.9 HYPOTHYROIDISM, UNSPECIFIED TYPE: ICD-10-CM

## 2022-07-23 DIAGNOSIS — E78.5 DYSLIPIDEMIA: ICD-10-CM

## 2022-07-23 DIAGNOSIS — R74.01 ELEVATED ALT MEASUREMENT: ICD-10-CM

## 2022-07-23 DIAGNOSIS — I10 PRIMARY HYPERTENSION: ICD-10-CM

## 2022-07-23 LAB
BASOPHIL %: 0.9 % (ref 0–1.2)
BASOPHIL ABSOLUTE #: 0.1 10*3/UL (ref 0–0.1)
BUN SERPL-MCNC: 19 MG/DL (ref 7–20)
CALCIUM SERPL-MCNC: 10.1 MG/DL (ref 8.6–10.6)
CHLORIDE SERPL-SCNC: 105 MMOL/L (ref 96–107)
CHOLEST SERPL-MCNC: 170 MG/DL (ref 140–200)
CO2 SERPL-SCNC: 29 MMOL/L (ref 22–32)
CREAT SERPL-MCNC: 0.8 MG/DL (ref 0.5–1.4)
DIFFERENTIAL TYPE: NORMAL
EOSINOPHIL %: 2.6 % (ref 0–10)
EOSINOPHIL ABSOLUTE #: 0.2 10*3/UL (ref 0–0.5)
GFR SERPL CREATININE-BSD FRML MDRD: >60 ML/MIN/{1.73M2}
GFR SERPL CREATININE-BSD FRML MDRD: >60 ML/MIN/{1.73M2}
GLUCOSE P FAST SERPL-MCNC: 105 MG/DL (ref 60–100)
HDLC SERPL-MCNC: 62 MG/DL
HEMATOCRIT: 39 % (ref 34–45)
HEMOGLOBIN: 12.9 G/DL (ref 11.2–15.7)
IMMATURE GRANULOCYTE ABSOLUTE: 0.02 10*3/UL (ref 0–0.05)
IMMATURE GRANULOCYTE PERCENT: 0.3 % (ref 0–0.5)
LDLC SERPL CALC-MCNC: 96 MG/DL (ref 30–100)
LYMPH PERCENT: 29 % (ref 20.5–51.1)
LYMPHOCYTE ABSOLUTE #: 1.9 10*3/UL (ref 1.2–3.4)
MEAN CORPUSCULAR HGB CONCENTRATION: 33.1 % (ref 32–36)
MEAN CORPUSCULAR HGB: 31.4 PG (ref 27–34)
MEAN CORPUSCULAR VOLUME: 94.9 FL (ref 79–95)
MEAN PLATELET VOLUME: 11.3 FL (ref 8.6–12.4)
MONOCYTE ABSOLUTE #: 0.7 10*3/UL (ref 0.2–0.9)
MONOCYTE PERCENT: 10.1 % (ref 4.3–12.9)
NEUTROPHIL ABSOLUTE #: 3.8 10*3/UL (ref 1.4–6.5)
NEUTROPHIL PERCENT: 57.1 % (ref 34–73.5)
PLATELET COUNT: 283 10*3/UL (ref 150–400)
POTASSIUM SERPL-SCNC: 4 MMOL/L (ref 3.5–5.3)
RED BLOOD CELL COUNT: 4.11 10*6/UL (ref 3.7–5.2)
RED CELL DISTRIBUTION WIDTH: 13.3 % (ref 11.3–14.8)
SODIUM SERPL-SCNC: 140 MMOL/L (ref 136–146)
TRIGL SERPL-MCNC: 62 MG/DL (ref 0–200)
TSH SERPL DL<=0.05 MIU/L-ACNC: 0.31 M[IU]/L (ref 0.3–4.82)
WHITE BLOOD CELL COUNT: 6.6 10*3/UL (ref 4–10)

## 2022-07-23 PROCEDURE — 36415 COLL VENOUS BLD VENIPUNCTURE: CPT | Performed by: FAMILY MEDICINE

## 2022-07-23 PROCEDURE — 80048 BASIC METABOLIC PNL TOTAL CA: CPT | Performed by: FAMILY MEDICINE

## 2022-07-23 PROCEDURE — 84443 ASSAY THYROID STIM HORMONE: CPT | Performed by: FAMILY MEDICINE

## 2022-07-23 PROCEDURE — 80061 LIPID PANEL: CPT | Performed by: FAMILY MEDICINE

## 2022-07-23 PROCEDURE — 85025 COMPLETE CBC W/AUTO DIFF WBC: CPT | Performed by: FAMILY MEDICINE

## 2022-07-25 ENCOUNTER — OFFICE VISIT (OUTPATIENT)
Dept: FAMILY MEDICINE | Age: 67
End: 2022-07-25

## 2022-07-25 VITALS
TEMPERATURE: 98 F | SYSTOLIC BLOOD PRESSURE: 136 MMHG | WEIGHT: 213 LBS | BODY MASS INDEX: 34.23 KG/M2 | DIASTOLIC BLOOD PRESSURE: 82 MMHG | HEART RATE: 84 BPM | HEIGHT: 66 IN | RESPIRATION RATE: 16 BRPM

## 2022-07-25 DIAGNOSIS — Z12.31 ENCOUNTER FOR SCREENING MAMMOGRAM FOR MALIGNANT NEOPLASM OF BREAST: ICD-10-CM

## 2022-07-25 DIAGNOSIS — Z23 NEED FOR PROPHYLACTIC VACCINATION WITH TETANUS-DIPHTHERIA (TD): ICD-10-CM

## 2022-07-25 DIAGNOSIS — Z00.00 MEDICARE ANNUAL WELLNESS VISIT, SUBSEQUENT: Primary | ICD-10-CM

## 2022-07-25 DIAGNOSIS — R73.9 ELEVATED BLOOD SUGAR: ICD-10-CM

## 2022-07-25 PROCEDURE — G0439 PPPS, SUBSEQ VISIT: HCPCS | Performed by: FAMILY MEDICINE

## 2022-07-25 PROCEDURE — 90471 IMMUNIZATION ADMIN: CPT | Performed by: FAMILY MEDICINE

## 2022-07-25 PROCEDURE — 90714 TD VACC NO PRESV 7 YRS+ IM: CPT | Performed by: FAMILY MEDICINE

## 2022-07-25 ASSESSMENT — COGNITIVE AND FUNCTIONAL STATUS - GENERAL
DO YOU HAVE SERIOUS DIFFICULTY WALKING OR CLIMBING STAIRS: NO
ARE YOU BLIND OR DO YOU HAVE SERIOUS DIFFICULTY SEEING, EVEN WHEN WEARING GLASSES: NO
ARE YOU DEAF OR DO YOU HAVE SERIOUS DIFFICULTY  HEARING: NO
BECAUSE OF A PHYSICAL, MENTAL, OR EMOTIONAL CONDITION, DO YOU HAVE SERIOUS DIFFICULTY CONCENTRATING, REMEMBERING OR MAKING DECISIONS: NO
DO YOU HAVE DIFFICULTY DRESSING OR BATHING: NO
BECAUSE OF A PHYSICAL, MENTAL, OR EMOTIONAL CONDITION, DO YOU HAVE DIFFICULTY DOING ERRANDS ALONE: NO

## 2022-07-25 ASSESSMENT — ACTIVITIES OF DAILY LIVING (ADL)
ADL_SCORE: 12
RECENT_DECLINE_ADL: NO
SENSORY_SUPPORT_DEVICES: EYEGLASSES
NEEDS_ASSIST: NO
ADL_BEFORE_ADMISSION: INDEPENDENT
ADL_SHORT_OF_BREATH: NO

## 2022-07-25 ASSESSMENT — PATIENT HEALTH QUESTIONNAIRE - PHQ9
2. FEELING DOWN, DEPRESSED OR HOPELESS: NOT AT ALL
SUM OF ALL RESPONSES TO PHQ9 QUESTIONS 1 AND 2: 0
1. LITTLE INTEREST OR PLEASURE IN DOING THINGS: NOT AT ALL
CLINICAL INTERPRETATION OF PHQ2 SCORE: NO FURTHER SCREENING NEEDED
SUM OF ALL RESPONSES TO PHQ9 QUESTIONS 1 AND 2: 0

## 2022-07-25 ASSESSMENT — MINI COG
PATIENT ABLE TO FILL IN THE CLOCK FACE WITH 10 MINUTES PAST 11 O'CLOCK?: YES, CLOCK IS CORRECT
TOTAL SCORE: 5
PATIENT ABLE TO REPEAT THE 3 WORDS GIVEN PREVIOUSLY?: WAS ABLE TO REPEAT BACK 3 WORDS CORRECTLY
PATIENT WAS GIVEN REPEAT BACK WORDS FROM VERSION: 2 - LEADER SEASON TABLE

## 2022-08-10 ENCOUNTER — VIRTUAL VISIT (OUTPATIENT)
Dept: FAMILY MEDICINE | Facility: CLINIC | Age: 67
End: 2022-08-10
Payer: COMMERCIAL

## 2022-08-10 DIAGNOSIS — U07.1 INFECTION DUE TO 2019 NOVEL CORONAVIRUS: Primary | ICD-10-CM

## 2022-08-10 DIAGNOSIS — R51.9 ACUTE NONINTRACTABLE HEADACHE, UNSPECIFIED HEADACHE TYPE: ICD-10-CM

## 2022-08-10 DIAGNOSIS — R05.9 COUGH: ICD-10-CM

## 2022-08-10 DIAGNOSIS — J02.9 SORE THROAT: ICD-10-CM

## 2022-08-10 PROCEDURE — 99214 OFFICE O/P EST MOD 30 MIN: CPT | Mod: GT | Performed by: INTERNAL MEDICINE

## 2022-08-10 RX ORDER — BENZONATATE 100 MG/1
100 CAPSULE ORAL 3 TIMES DAILY PRN
Qty: 60 CAPSULE | Refills: 0 | Status: SHIPPED | OUTPATIENT
Start: 2022-08-10 | End: 2023-09-21

## 2022-08-10 NOTE — PROGRESS NOTES
Ashli is a 67 year old who is being evaluated via a billable video visit.      How would you like to obtain your AVS? MyChart  If the video visit is dropped, the invitation should be resent by: Text to cell phone: [phone]  Will anyone else be joining your video visit? No          Assessment & Plan   Problem List Items Addressed This Visit    None     Visit Diagnoses     Infection due to 2019 novel coronavirus    -  Primary    Relevant Medications    nirmatrelvir and ritonavir (PAXLOVID) therapy pack    Cough        Relevant Medications    benzonatate (TESSALON) 100 MG capsule    Acute nonintractable headache, unspecified headache type        Sore throat             Discussed starting Paxlovid 5-day course, discussed side effects and drug drug interactions.  Antitussive medications prescribed, continue supportive measures.  Honey in Tea, etc.. if any worsening of cough, shortness of breath, difficulty breathing, pleuritic chest pain, spiking high fevers then seek immediate Medical attention.  The lifestyle, all questions answered, patient had contact with a friend prior to getting oximetry away to diagnosis,  we advised on CDC guidelines on Quarantine behavior and showed shailesh testing for contacts with COVID patients.         See Patient Instructions    Return if symptoms worsen or fail to improve, for As needed and if symptoms worsen.    Barrera Gastelum MD of the distal fragment, as below.  Hello we she no [facility]    Subjective   Ashli is a 67 year old presenting for the following health issues:  Covid Concern      HPI       COVID-19 Symptom Review  How many days ago did these symptoms start? 1 day    Are any of the following symptoms significant for you?    New or worsening difficulty breathing? No    Worsening cough? Yes, I am coughing up mucus.    Fever or chills? Yes, I felt feverish or had chills.    Headache: YES    Sore throat: YES    Chest pain: No    Diarrhea: No    Body  "aches? No    What treatments has patient tried? Advil and salt water   Does patient live in a nursing home, group home, or shelter? No  Does patient have a way to get food/medications during quarantined? Yes, I have a friend or family member who can help me.              Home test positive   Felt \"awful last night, congestion head hurt, eyes hurt..\"    No diarrhea,     Breathing is OK     has a pulse oxymeter    Not of lot of cough,   Review of Systems   Constitutional, HEENT, cardiovascular, pulmonary, gi and gu systems are negative, except as otherwise noted.      Objective           Vitals:  No vitals were obtained today due to virtual visit.    Physical Exam   GENERAL: Healthy, alert and no distress  EYES: Eyes grossly normal to inspection.  No discharge or erythema, or obvious scleral/conjunctival abnormalities.  RESP: No audible wheeze, cough, or visible cyanosis.  No visible retractions or increased work of breathing.    SKIN: Visible skin clear. No significant rash, abnormal pigmentation or lesions.  NEURO: Cranial nerves grossly intact.  Mentation and speech appropriate for age.  PSYCH: Mentation appears normal, affect normal/bright, judgement and insight intact, normal speech and appearance well-groomed.    Office Visit on 05/27/2022   Component Date Value Ref Range Status     TSH 05/27/2022 3.27  0.40 - 4.00 mU/L Final     Sodium 05/27/2022 142  133 - 144 mmol/L Final     Potassium 05/27/2022 4.8  3.4 - 5.3 mmol/L Final     Chloride 05/27/2022 110 (A) 94 - 109 mmol/L Final     Carbon Dioxide (CO2) 05/27/2022 30  20 - 32 mmol/L Final     Anion Gap 05/27/2022 2 (A) 3 - 14 mmol/L Final     Urea Nitrogen 05/27/2022 12  7 - 30 mg/dL Final     Creatinine 05/27/2022 0.72  0.52 - 1.04 mg/dL Final     Calcium 05/27/2022 8.9  8.5 - 10.1 mg/dL Final     Glucose 05/27/2022 110 (A) 70 - 99 mg/dL Final     Alkaline Phosphatase 05/27/2022 95  40 - 150 U/L Final     AST 05/27/2022 18  0 - 45 U/L Final     ALT " 05/27/2022 32  0 - 50 U/L Final     Protein Total 05/27/2022 8.1  6.8 - 8.8 g/dL Final     Albumin 05/27/2022 3.9  3.4 - 5.0 g/dL Final     Bilirubin Total 05/27/2022 0.3  0.2 - 1.3 mg/dL Final     GFR Estimate 05/27/2022 90  >60 mL/min/1.73m2 Final    Effective December 21, 2021 eGFRcr in adults is calculated using the 2021 CKD-EPI creatinine equation which includes age and gender (Yessy et al., NEJM, DOI: 10.1056/FCHJrp7446278)     Cholesterol 05/27/2022 201 (A) <200 mg/dL Final     Triglycerides 05/27/2022 108  <150 mg/dL Final     Direct Measure HDL 05/27/2022 67  >=50 mg/dL Final     LDL Cholesterol Calculated 05/27/2022 112 (A) <=100 mg/dL Final     Non HDL Cholesterol 05/27/2022 134 (A) <130 mg/dL Final     Patient Fasting > 8hrs? 05/27/2022 Yes   Final               Video-Visit Details    Video Start Time: 9:04 AM    Type of service:  Video Visit    Video End Time:9:19 AM    Originating Location (pt. Location): Home    Distant Location (provider location):  [facility]     Platform used for Video Visit: Lindsay Doan

## 2022-08-11 ENCOUNTER — WALK IN (OUTPATIENT)
Dept: URGENT CARE | Age: 67
End: 2022-08-11

## 2022-08-11 VITALS
RESPIRATION RATE: 16 BRPM | SYSTOLIC BLOOD PRESSURE: 142 MMHG | HEART RATE: 70 BPM | OXYGEN SATURATION: 99 % | DIASTOLIC BLOOD PRESSURE: 86 MMHG | TEMPERATURE: 97.5 F

## 2022-08-11 DIAGNOSIS — L03.032 PARONYCHIA OF GREAT TOE OF LEFT FOOT: Primary | ICD-10-CM

## 2022-08-11 PROCEDURE — 99213 OFFICE O/P EST LOW 20 MIN: CPT | Performed by: INTERNAL MEDICINE

## 2022-08-11 RX ORDER — CEFADROXIL 500 MG/1
500 CAPSULE ORAL 2 TIMES DAILY
Qty: 20 CAPSULE | Refills: 0 | Status: SHIPPED | OUTPATIENT
Start: 2022-08-11 | End: 2022-08-21

## 2022-09-22 ENCOUNTER — LAB SERVICES (OUTPATIENT)
Dept: LAB | Age: 67
End: 2022-09-22

## 2022-09-22 DIAGNOSIS — R73.9 ELEVATED BLOOD SUGAR: ICD-10-CM

## 2022-09-22 LAB — GLUCOSE P FAST SERPL-MCNC: 93 MG/DL (ref 60–100)

## 2022-09-22 PROCEDURE — 82947 ASSAY GLUCOSE BLOOD QUANT: CPT | Performed by: FAMILY MEDICINE

## 2022-09-22 PROCEDURE — 36415 COLL VENOUS BLD VENIPUNCTURE: CPT | Performed by: FAMILY MEDICINE

## 2022-09-23 ENCOUNTER — IMAGING SERVICES (OUTPATIENT)
Dept: MAMMOGRAPHY | Age: 67
End: 2022-09-23

## 2022-09-23 DIAGNOSIS — Z12.31 ENCOUNTER FOR SCREENING MAMMOGRAM FOR MALIGNANT NEOPLASM OF BREAST: ICD-10-CM

## 2022-09-23 PROCEDURE — 77067 SCR MAMMO BI INCL CAD: CPT | Performed by: RADIOLOGY

## 2022-09-23 PROCEDURE — 77063 BREAST TOMOSYNTHESIS BI: CPT | Performed by: RADIOLOGY

## 2022-10-09 ENCOUNTER — HEALTH MAINTENANCE LETTER (OUTPATIENT)
Age: 67
End: 2022-10-09

## 2022-11-04 ENCOUNTER — VIRTUAL VISIT (OUTPATIENT)
Dept: INTERNAL MEDICINE | Facility: CLINIC | Age: 67
End: 2022-11-04
Payer: COMMERCIAL

## 2022-11-04 DIAGNOSIS — R30.0 DYSURIA: Primary | ICD-10-CM

## 2022-11-04 LAB
ALBUMIN UR-MCNC: 100 MG/DL
APPEARANCE UR: CLEAR
BACTERIA #/AREA URNS HPF: ABNORMAL /HPF
BILIRUB UR QL STRIP: NEGATIVE
COLOR UR AUTO: YELLOW
GLUCOSE UR STRIP-MCNC: NEGATIVE MG/DL
HGB UR QL STRIP: ABNORMAL
KETONES UR STRIP-MCNC: NEGATIVE MG/DL
LEUKOCYTE ESTERASE UR QL STRIP: ABNORMAL
MUCOUS THREADS #/AREA URNS LPF: PRESENT /LPF
NITRATE UR QL: POSITIVE
PH UR STRIP: 5.5 [PH] (ref 5–7)
RBC URINE: 42 /HPF
SP GR UR STRIP: 1.03 (ref 1–1.03)
SQUAMOUS EPITHELIAL: 1 /HPF
UROBILINOGEN UR STRIP-MCNC: NORMAL MG/DL
WBC URINE: 102 /HPF

## 2022-11-04 PROCEDURE — 87086 URINE CULTURE/COLONY COUNT: CPT | Performed by: NURSE PRACTITIONER

## 2022-11-04 PROCEDURE — 81001 URINALYSIS AUTO W/SCOPE: CPT | Performed by: NURSE PRACTITIONER

## 2022-11-04 PROCEDURE — 99213 OFFICE O/P EST LOW 20 MIN: CPT | Mod: TEL | Performed by: NURSE PRACTITIONER

## 2022-11-04 PROCEDURE — 87186 SC STD MICRODIL/AGAR DIL: CPT | Performed by: NURSE PRACTITIONER

## 2022-11-04 RX ORDER — NITROFURANTOIN 25; 75 MG/1; MG/1
100 CAPSULE ORAL 2 TIMES DAILY
Qty: 10 CAPSULE | Refills: 0 | Status: SHIPPED | OUTPATIENT
Start: 2022-11-04 | End: 2022-11-09

## 2022-11-04 NOTE — PROGRESS NOTES
Ashli is a 67 year old who is being evaluated via a billable telephone visit.      What phone number would you like to be contacted at? [phone]  How would you like to obtain your AVS? [facility]    Assessment & Plan   Problem List Items Addressed This Visit    None  Visit Diagnoses     Dysuria    -  Primary    Relevant Medications    nitroFURantoin macrocrystal-monohydrate (MACROBID) 100 MG capsule    Other Relevant Orders    UA Macro with Reflex to Micro and Culture - lab collect         She will go to her local lab today to leave a urine sample and we will empirically begin Macrobid 100 mg twice daily thereafter.  She declines a prescription for Pyridium.  She is encouraged to increase fluid intake.      No follow-ups on file.    Munira Larsen NP  [facility]   Ashli is a 67 year old with a PMH of ADHD, GERD, HLD, obesity, ZELDA, presenting for the following health issues:  UTI (went to water aerobics and sat down naked on chair to dress)      HPI   She has a PMH of recurrent UTIs (about 20 years ago, no recent infections) which started about 2 days ago. She does not have flank pain, fever, chills. She has existing urinary incontinence and frequency both of which have been worse in the past 2 days.     Genitourinary - Female  Onset/Duration: 11/02/2022 went to water aerobics and sat down naked on chair to dress  Description:   Painful urination (Dysuria): YES           Frequency: YES  Blood in urine (Hematuria): No  Delay in urine (Hesitency): YES  Intensity: burning is severe, incontinence is mild  Progression of Symptoms:  worsening  Accompanying Signs & Symptoms:  Fever/chills: No  Flank pain: No  Nausea and vomiting: No  Vaginal symptoms: none  Abdominal/Pelvic Pain: No  History:   History of frequent UTI s: YES  History of kidney stones: No  Sexually Active: YES  Possibility of pregnancy: No  Precipitating or alleviating factors: N/A  Therapies tried and outcome: none             Objective    Vitals - Patient Reported  Weight (Patient Reported): 140.6 kg (310 lb)      Vitals:  No vitals were obtained today due to virtual visit.    Physical Exam   alert and no distress  PSYCH: Alert and oriented times 3; coherent speech, normal   rate and volume, able to articulate logical thoughts, able   to abstract reason, no tangential thoughts, no hallucinations   or delusions  Her affect is normal  RESP: No cough, no audible wheezing, able to talk in full sentences  Remainder of exam unable to be completed due to telephone visits            Phone call duration: 7 minutes

## 2022-11-06 LAB — BACTERIA UR CULT: ABNORMAL

## 2022-11-08 DIAGNOSIS — F41.1 GAD (GENERALIZED ANXIETY DISORDER): ICD-10-CM

## 2022-11-09 RX ORDER — BUSPIRONE HYDROCHLORIDE 7.5 MG/1
TABLET ORAL
Qty: 90 TABLET | Refills: 1 | Status: SHIPPED | OUTPATIENT
Start: 2022-11-09 | End: 2023-04-27

## 2022-11-28 ENCOUNTER — WALK IN (OUTPATIENT)
Dept: URGENT CARE | Age: 67
End: 2022-11-28

## 2022-11-28 ENCOUNTER — E-ADVICE (OUTPATIENT)
Dept: FAMILY MEDICINE | Age: 67
End: 2022-11-28

## 2022-11-28 VITALS
TEMPERATURE: 97.7 F | OXYGEN SATURATION: 96 % | SYSTOLIC BLOOD PRESSURE: 145 MMHG | DIASTOLIC BLOOD PRESSURE: 82 MMHG | HEART RATE: 98 BPM | RESPIRATION RATE: 20 BRPM

## 2022-11-28 DIAGNOSIS — U07.1 COVID: Primary | ICD-10-CM

## 2022-11-28 DIAGNOSIS — J45.901 EXACERBATION OF ASTHMA, UNSPECIFIED ASTHMA SEVERITY, UNSPECIFIED WHETHER PERSISTENT: ICD-10-CM

## 2022-11-28 DIAGNOSIS — R03.0 ELEVATED BLOOD PRESSURE READING: ICD-10-CM

## 2022-11-28 PROCEDURE — 99214 OFFICE O/P EST MOD 30 MIN: CPT | Performed by: FAMILY MEDICINE

## 2022-11-28 RX ORDER — PREDNISONE 20 MG/1
40 TABLET ORAL DAILY
Qty: 10 TABLET | Refills: 0 | Status: SHIPPED | OUTPATIENT
Start: 2022-11-28 | End: 2022-12-03

## 2022-11-28 ASSESSMENT — ENCOUNTER SYMPTOMS
SORE THROAT: 0
CONSTIPATION: 0
DIARRHEA: 0
SINUS PRESSURE: 0
ABDOMINAL PAIN: 0
WHEEZING: 0
SINUS PAIN: 0
SHORTNESS OF BREATH: 0
NAUSEA: 0
COUGH: 1
VOMITING: 0
HEMOPTYSIS: 0
FATIGUE: 0
RHINORRHEA: 0
HEADACHES: 1
CHILLS: 0
SWEATS: 0
FEVER: 0

## 2022-11-28 ASSESSMENT — COPD QUESTIONNAIRES: COPD: 0

## 2022-12-05 ENCOUNTER — TELEPHONE (OUTPATIENT)
Dept: ALLERGY | Age: 67
End: 2022-12-05

## 2022-12-14 ENCOUNTER — APPOINTMENT (OUTPATIENT)
Dept: ALLERGY | Age: 67
End: 2022-12-14

## 2022-12-19 ENCOUNTER — TELEPHONE (OUTPATIENT)
Dept: FAMILY MEDICINE | Age: 67
End: 2022-12-19

## 2023-01-03 DIAGNOSIS — E03.8 SUBCLINICAL HYPOTHYROIDISM: ICD-10-CM

## 2023-01-06 RX ORDER — LEVOTHYROXINE SODIUM 75 UG/1
TABLET ORAL
Qty: 30 TABLET | Refills: 2 | Status: SHIPPED | OUTPATIENT
Start: 2023-01-06 | End: 2023-04-21

## 2023-01-06 NOTE — TELEPHONE ENCOUNTER
Prescription approved per [facility] Refill Protocol.  Janice Bustamante RN

## 2023-01-14 DIAGNOSIS — E78.5 DYSLIPIDEMIA: ICD-10-CM

## 2023-01-16 RX ORDER — ATORVASTATIN CALCIUM 40 MG/1
TABLET, FILM COATED ORAL
Qty: 90 TABLET | Refills: 0 | Status: SHIPPED | OUTPATIENT
Start: 2023-01-16 | End: 2023-04-12 | Stop reason: SDUPTHER

## 2023-01-18 ENCOUNTER — MYC MEDICAL ADVICE (OUTPATIENT)
Dept: FAMILY MEDICINE | Facility: CLINIC | Age: 68
End: 2023-01-18
Payer: COMMERCIAL

## 2023-01-25 ASSESSMENT — ENCOUNTER SYMPTOMS
HEADACHES: 0
ABDOMINAL PAIN: 0
VOMITING: 0
FACIAL SWELLING: 0
SLEEP DISTURBANCE: 0
APPETITE CHANGE: 0
WHEEZING: 0
NERVOUS/ANXIOUS: 0
DIARRHEA: 0
ADENOPATHY: 0
FEVER: 0
SORE THROAT: 0
CHEST TIGHTNESS: 0

## 2023-01-27 ENCOUNTER — OFFICE VISIT (OUTPATIENT)
Dept: ALLERGY | Age: 68
End: 2023-01-27

## 2023-01-27 VITALS
BODY MASS INDEX: 33.75 KG/M2 | SYSTOLIC BLOOD PRESSURE: 122 MMHG | TEMPERATURE: 97.6 F | RESPIRATION RATE: 18 BRPM | HEART RATE: 79 BPM | OXYGEN SATURATION: 97 % | DIASTOLIC BLOOD PRESSURE: 74 MMHG | HEIGHT: 66 IN | WEIGHT: 210 LBS

## 2023-01-27 DIAGNOSIS — J30.9 ALLERGIC RHINOCONJUNCTIVITIS: ICD-10-CM

## 2023-01-27 DIAGNOSIS — H10.10 ALLERGIC RHINOCONJUNCTIVITIS: ICD-10-CM

## 2023-01-27 DIAGNOSIS — J45.30 MILD PERSISTENT ALLERGIC ASTHMA: Primary | ICD-10-CM

## 2023-01-27 DIAGNOSIS — J31.0 MIXED RHINITIS: ICD-10-CM

## 2023-01-27 DIAGNOSIS — K21.9 GASTROESOPHAGEAL REFLUX DISEASE, UNSPECIFIED WHETHER ESOPHAGITIS PRESENT: ICD-10-CM

## 2023-01-27 DIAGNOSIS — F41.9 ANXIETY: ICD-10-CM

## 2023-01-27 PROCEDURE — 99214 OFFICE O/P EST MOD 30 MIN: CPT | Performed by: ALLERGY & IMMUNOLOGY

## 2023-01-27 PROCEDURE — 94010 BREATHING CAPACITY TEST: CPT | Performed by: ALLERGY & IMMUNOLOGY

## 2023-01-27 RX ORDER — ALBUTEROL SULFATE 90 UG/1
2 AEROSOL, METERED RESPIRATORY (INHALATION) EVERY 4 HOURS PRN
Qty: 1 EACH | Refills: 0 | Status: SHIPPED | OUTPATIENT
Start: 2023-01-27

## 2023-01-27 RX ORDER — OLOPATADINE HYDROCHLORIDE 1 MG/ML
1 SOLUTION/ DROPS OPHTHALMIC 2 TIMES DAILY PRN
Qty: 1 EACH | Refills: 3 | Status: CANCELLED | OUTPATIENT
Start: 2023-01-27

## 2023-02-02 ENCOUNTER — TELEPHONE (OUTPATIENT)
Dept: CARDIOLOGY | Age: 68
End: 2023-02-02

## 2023-02-08 ENCOUNTER — TELEPHONE (OUTPATIENT)
Dept: FAMILY MEDICINE | Age: 68
End: 2023-02-08

## 2023-02-08 DIAGNOSIS — R73.01 ELEVATED FASTING BLOOD SUGAR: ICD-10-CM

## 2023-02-08 DIAGNOSIS — E03.8 OTHER SPECIFIED HYPOTHYROIDISM: ICD-10-CM

## 2023-02-08 DIAGNOSIS — E78.5 DYSLIPIDEMIA: ICD-10-CM

## 2023-02-08 DIAGNOSIS — Z51.81 MEDICATION MONITORING ENCOUNTER: Primary | ICD-10-CM

## 2023-02-11 DIAGNOSIS — I10 ESSENTIAL HYPERTENSION: ICD-10-CM

## 2023-02-13 RX ORDER — VERAPAMIL HYDROCHLORIDE 180 MG/1
180 CAPSULE, EXTENDED RELEASE ORAL NIGHTLY
Qty: 90 CAPSULE | Refills: 3 | Status: SHIPPED | OUTPATIENT
Start: 2023-02-13

## 2023-02-13 RX ORDER — HYDROCHLOROTHIAZIDE 25 MG/1
50 TABLET ORAL DAILY
Qty: 180 TABLET | Refills: 3 | Status: SHIPPED | OUTPATIENT
Start: 2023-02-13

## 2023-03-02 ENCOUNTER — APPOINTMENT (OUTPATIENT)
Dept: CARDIOLOGY | Age: 68
End: 2023-03-02

## 2023-03-17 RX ORDER — LOSARTAN POTASSIUM 100 MG/1
100 TABLET ORAL DAILY
Qty: 90 TABLET | Refills: 1 | Status: SHIPPED | OUTPATIENT
Start: 2023-03-17 | End: 2023-06-20

## 2023-03-31 ENCOUNTER — OFFICE VISIT (OUTPATIENT)
Dept: CARDIOLOGY | Age: 68
End: 2023-03-31

## 2023-03-31 VITALS
DIASTOLIC BLOOD PRESSURE: 72 MMHG | HEIGHT: 66 IN | BODY MASS INDEX: 34.55 KG/M2 | RESPIRATION RATE: 16 BRPM | HEART RATE: 78 BPM | SYSTOLIC BLOOD PRESSURE: 122 MMHG | OXYGEN SATURATION: 98 % | WEIGHT: 215 LBS

## 2023-03-31 DIAGNOSIS — I25.10 CORONARY ARTERY DISEASE INVOLVING NATIVE CORONARY ARTERY OF NATIVE HEART WITHOUT ANGINA PECTORIS: ICD-10-CM

## 2023-03-31 DIAGNOSIS — I10 ESSENTIAL HYPERTENSION: Primary | ICD-10-CM

## 2023-03-31 PROCEDURE — 99214 OFFICE O/P EST MOD 30 MIN: CPT | Performed by: INTERNAL MEDICINE

## 2023-03-31 PROCEDURE — 93000 ELECTROCARDIOGRAM COMPLETE: CPT | Performed by: INTERNAL MEDICINE

## 2023-03-31 RX ORDER — CLOBETASOL PROPIONATE 0.5 MG/G
OINTMENT TOPICAL
COMMUNITY
Start: 2023-02-23

## 2023-03-31 ASSESSMENT — PAIN SCALES - GENERAL: PAINLEVEL: 0

## 2023-04-10 RX ORDER — FLUTICASONE PROPIONATE 50 MCG
SPRAY, SUSPENSION (ML) NASAL
Qty: 48 G | Refills: 0 | Status: SHIPPED | OUTPATIENT
Start: 2023-04-10 | End: 2023-09-21 | Stop reason: SDUPTHER

## 2023-04-12 ENCOUNTER — OFFICE VISIT (OUTPATIENT)
Dept: OPTOMETRY | Age: 68
End: 2023-04-12

## 2023-04-12 ENCOUNTER — E-ADVICE (OUTPATIENT)
Dept: FAMILY MEDICINE | Age: 68
End: 2023-04-12

## 2023-04-12 DIAGNOSIS — H52.4 PRESBYOPIA: ICD-10-CM

## 2023-04-12 DIAGNOSIS — H52.223 REGULAR ASTIGMATISM OF BOTH EYES: ICD-10-CM

## 2023-04-12 DIAGNOSIS — E78.5 DYSLIPIDEMIA: ICD-10-CM

## 2023-04-12 DIAGNOSIS — E03.9 HYPOTHYROIDISM, UNSPECIFIED TYPE: ICD-10-CM

## 2023-04-12 DIAGNOSIS — H52.13 MYOPIA OF BOTH EYES: ICD-10-CM

## 2023-04-12 DIAGNOSIS — H25.13 AGE-RELATED NUCLEAR CATARACT OF BOTH EYES: Primary | ICD-10-CM

## 2023-04-12 PROCEDURE — 92015 DETERMINE REFRACTIVE STATE: CPT | Performed by: OPTOMETRIST

## 2023-04-12 PROCEDURE — 92014 COMPRE OPH EXAM EST PT 1/>: CPT | Performed by: OPTOMETRIST

## 2023-04-12 RX ORDER — LEVOTHYROXINE SODIUM 137 UG/1
137 TABLET ORAL DAILY
Qty: 90 TABLET | Refills: 1 | Status: SHIPPED | OUTPATIENT
Start: 2023-04-12 | End: 2023-10-11

## 2023-04-12 RX ORDER — ATORVASTATIN CALCIUM 40 MG/1
40 TABLET, FILM COATED ORAL DAILY
Qty: 90 TABLET | Refills: 1 | Status: SHIPPED | OUTPATIENT
Start: 2023-04-12 | End: 2023-10-11

## 2023-04-12 ASSESSMENT — SLIT LAMP EXAM - LIDS
COMMENTS: NORMAL
COMMENTS: NORMAL

## 2023-04-12 ASSESSMENT — REFRACTION_MANIFEST
OS_SPHERE: -3.25
OS_ADD: +2.25
OS_CYLINDER: +1.25
OD_ADD: +2.25
OD_CYLINDER: +1.25
OD_SPHERE: -3.00
OS_AXIS: 070
OD_AXIS: 150

## 2023-04-12 ASSESSMENT — EXTERNAL EXAM - RIGHT EYE: OD_EXAM: NORMAL

## 2023-04-12 ASSESSMENT — CONF VISUAL FIELD
OS_SUPERIOR_NASAL_RESTRICTION: 0
OS_INFERIOR_TEMPORAL_RESTRICTION: 0
OD_INFERIOR_TEMPORAL_RESTRICTION: 0
OS_NORMAL: 1
OS_INFERIOR_NASAL_RESTRICTION: 0
OD_SUPERIOR_NASAL_RESTRICTION: 0
OD_SUPERIOR_TEMPORAL_RESTRICTION: 0
OS_SUPERIOR_TEMPORAL_RESTRICTION: 0
OD_INFERIOR_NASAL_RESTRICTION: 0
OD_NORMAL: 1

## 2023-04-12 ASSESSMENT — KERATOMETRY
OS_K2POWER_DIOPTERS: 44.12
OS_K1POWER_DIOPTERS: 42.62
OS_AXISANGLE2_DEGREES: 180
OS_AXISANGLE_DEGREES: 090
OD_K2POWER_DIOPTERS: 43.25
OD_K1POWER_DIOPTERS: 43.00
OD_AXISANGLE_DEGREES: 090
OD_AXISANGLE2_DEGREES: 180

## 2023-04-12 ASSESSMENT — VISUAL ACUITY
METHOD: SNELLEN - LINEAR
OD_CC: JAEGER 1
OD_CC+: -1
CORRECTION_TYPE: GLASSES
OD_CC: 20/40
OD_PH_CC+: -2
OS_CC: 20/30
OS_CC+: -1
OD_PH_CC: 20/30

## 2023-04-12 ASSESSMENT — REFRACTION_WEARINGRX
OD_AXIS: 117
OS_AXIS: 074
OD_CYLINDER: +1.50
OD_SPHERE: -3.50
OS_SPHERE: -3.75
OS_ADD: +2.25
OS_CYLINDER: +1.50
OD_ADD: +2.25

## 2023-04-12 ASSESSMENT — EXTERNAL EXAM - LEFT EYE: OS_EXAM: NORMAL

## 2023-04-12 ASSESSMENT — CUP TO DISC RATIO
OD_RATIO: 0.3
OS_RATIO: 0.4

## 2023-04-12 ASSESSMENT — TONOMETRY
OS_IOP_MMHG: 15
IOP_METHOD: APPLANATION
OD_IOP_MMHG: 15

## 2023-04-20 DIAGNOSIS — E03.8 SUBCLINICAL HYPOTHYROIDISM: ICD-10-CM

## 2023-04-21 RX ORDER — LEVOTHYROXINE SODIUM 75 UG/1
TABLET ORAL
Qty: 90 TABLET | Refills: 0 | Status: SHIPPED | OUTPATIENT
Start: 2023-04-21 | End: 2024-09-13

## 2023-04-25 ENCOUNTER — LAB SERVICES (OUTPATIENT)
Dept: LAB | Age: 68
End: 2023-04-25

## 2023-04-25 DIAGNOSIS — F41.1 GAD (GENERALIZED ANXIETY DISORDER): ICD-10-CM

## 2023-04-25 DIAGNOSIS — E78.5 DYSLIPIDEMIA: ICD-10-CM

## 2023-04-25 DIAGNOSIS — E03.8 OTHER SPECIFIED HYPOTHYROIDISM: ICD-10-CM

## 2023-04-25 DIAGNOSIS — Z51.81 MEDICATION MONITORING ENCOUNTER: ICD-10-CM

## 2023-04-25 DIAGNOSIS — R73.01 ELEVATED FASTING BLOOD SUGAR: ICD-10-CM

## 2023-04-25 LAB
ALBUMIN SERPL-MCNC: 3.8 G/DL (ref 3.6–5.1)
ALBUMIN/GLOB SERPL: 1 {RATIO} (ref 1–2.4)
ALP SERPL-CCNC: 99 UNITS/L (ref 45–117)
ALT SERPL-CCNC: 36 UNITS/L
ANION GAP SERPL CALC-SCNC: 11 MMOL/L (ref 7–19)
AST SERPL-CCNC: 23 UNITS/L
BASOPHILS # BLD: 0.1 K/MCL (ref 0–0.3)
BASOPHILS NFR BLD: 1 %
BILIRUB SERPL-MCNC: 0.9 MG/DL (ref 0.2–1)
BUN SERPL-MCNC: 21 MG/DL (ref 6–20)
BUN/CREAT SERPL: 28 (ref 7–25)
CALCIUM SERPL-MCNC: 9.9 MG/DL (ref 8.4–10.2)
CHLORIDE SERPL-SCNC: 103 MMOL/L (ref 97–110)
CHOLEST SERPL-MCNC: 164 MG/DL
CHOLEST/HDLC SERPL: 2.8 {RATIO}
CO2 SERPL-SCNC: 32 MMOL/L (ref 21–32)
CREAT SERPL-MCNC: 0.76 MG/DL (ref 0.51–0.95)
DEPRECATED RDW RBC: 45.9 FL (ref 39–50)
EOSINOPHIL # BLD: 0.3 K/MCL (ref 0–0.5)
EOSINOPHIL NFR BLD: 4 %
ERYTHROCYTE [DISTWIDTH] IN BLOOD: 12.8 % (ref 11–15)
FASTING DURATION TIME PATIENT: ABNORMAL H
GFR SERPLBLD BASED ON 1.73 SQ M-ARVRAT: 84 ML/MIN
GLOBULIN SER-MCNC: 3.7 G/DL (ref 2–4)
GLUCOSE SERPL-MCNC: 95 MG/DL (ref 70–99)
HCT VFR BLD CALC: 39.4 % (ref 36–46.5)
HDLC SERPL-MCNC: 58 MG/DL
HGB BLD-MCNC: 12.7 G/DL (ref 12–15.5)
IMM GRANULOCYTES # BLD AUTO: 0 K/MCL (ref 0–0.2)
IMM GRANULOCYTES # BLD: 0 %
LDLC SERPL CALC-MCNC: 94 MG/DL
LYMPHOCYTES # BLD: 2.4 K/MCL (ref 1–4)
LYMPHOCYTES NFR BLD: 35 %
MCH RBC QN AUTO: 31.1 PG (ref 26–34)
MCHC RBC AUTO-ENTMCNC: 32.2 G/DL (ref 32–36.5)
MCV RBC AUTO: 96.6 FL (ref 78–100)
MONOCYTES # BLD: 0.7 K/MCL (ref 0.3–0.9)
MONOCYTES NFR BLD: 11 %
NEUTROPHILS # BLD: 3.4 K/MCL (ref 1.8–7.7)
NEUTROPHILS NFR BLD: 49 %
NONHDLC SERPL-MCNC: 106 MG/DL
NRBC BLD MANUAL-RTO: 0 /100 WBC
PLATELET # BLD AUTO: 285 K/MCL (ref 140–450)
POTASSIUM SERPL-SCNC: 3.7 MMOL/L (ref 3.4–5.1)
PROT SERPL-MCNC: 7.5 G/DL (ref 6.4–8.2)
RBC # BLD: 4.08 MIL/MCL (ref 4–5.2)
SODIUM SERPL-SCNC: 142 MMOL/L (ref 135–145)
TRIGL SERPL-MCNC: 58 MG/DL
TSH SERPL-ACNC: 0.46 MCUNITS/ML (ref 0.35–5)
WBC # BLD: 6.8 K/MCL (ref 4.2–11)

## 2023-04-25 PROCEDURE — 80061 LIPID PANEL: CPT | Performed by: INTERNAL MEDICINE

## 2023-04-25 PROCEDURE — 85025 COMPLETE CBC W/AUTO DIFF WBC: CPT | Performed by: INTERNAL MEDICINE

## 2023-04-25 PROCEDURE — 80053 COMPREHEN METABOLIC PANEL: CPT | Performed by: INTERNAL MEDICINE

## 2023-04-25 PROCEDURE — 84443 ASSAY THYROID STIM HORMONE: CPT | Performed by: INTERNAL MEDICINE

## 2023-04-25 PROCEDURE — 36415 COLL VENOUS BLD VENIPUNCTURE: CPT | Performed by: FAMILY MEDICINE

## 2023-04-27 ENCOUNTER — PATIENT OUTREACH (OUTPATIENT)
Dept: CARE COORDINATION | Facility: CLINIC | Age: 68
End: 2023-04-27
Payer: COMMERCIAL

## 2023-04-27 RX ORDER — BUSPIRONE HYDROCHLORIDE 7.5 MG/1
TABLET ORAL
Qty: 90 TABLET | Refills: 0 | Status: SHIPPED | OUTPATIENT
Start: 2023-04-27 | End: 2023-07-27 | Stop reason: SDUPTHER

## 2023-05-01 DIAGNOSIS — F41.1 GAD (GENERALIZED ANXIETY DISORDER): ICD-10-CM

## 2023-05-02 ENCOUNTER — OFFICE VISIT (OUTPATIENT)
Dept: PODIATRY | Facility: CLINIC | Age: 68
End: 2023-05-02
Payer: COMMERCIAL

## 2023-05-02 VITALS — HEART RATE: 87 BPM | BODY MASS INDEX: 43.35 KG/M2 | OXYGEN SATURATION: 90 % | WEIGHT: 293 LBS

## 2023-05-02 DIAGNOSIS — M10.9 ACUTE GOUT OF RIGHT FOOT, UNSPECIFIED CAUSE: Primary | ICD-10-CM

## 2023-05-02 PROCEDURE — 99213 OFFICE O/P EST LOW 20 MIN: CPT | Performed by: PODIATRIST

## 2023-05-02 RX ORDER — INDOMETHACIN 50 MG/1
50 CAPSULE ORAL 2 TIMES DAILY WITH MEALS
Qty: 15 CAPSULE | Refills: 0 | Status: SHIPPED | OUTPATIENT
Start: 2023-05-02 | End: 2024-09-13

## 2023-05-02 RX ORDER — BUSPIRONE HYDROCHLORIDE 7.5 MG/1
TABLET ORAL
Qty: 90 TABLET | Refills: 0 | OUTPATIENT
Start: 2023-05-02

## 2023-05-02 ASSESSMENT — PAIN SCALES - GENERAL: PAINLEVEL: MILD PAIN (3)

## 2023-05-02 NOTE — PROGRESS NOTES
FOOT AND ANKLE SURGERY/PODIATRY Progress Note        ASSESSMENT:   Acute gout right foot    HPI: Ashli Rogers was seen again today complaining of severe pain, redness and swelling on the lateral aspect of the right foot x1 week.  The patient stated she had a very sudden onset of pain.  The pain is aggravated with weightbearing, ambulation and shoe gear.  She is unable to wear normal shoes because of the pain.  She has pain even while resting at night.  She stated that she was unable to sleep at least 1 night because of the pain.  She denies any trauma to her foot.  She has been taking ibuprofen which does give her some relief.      Past Medical History:   Diagnosis Date     Cataracts, bilateral      Closed fracture of medial malleolus     Distal avulsion fracture of the medial malleoli     Diverticulitis of colon 2/3/2017     DJD (degenerative joint disease) 05/24/2011    right knee, s/p injections     Erythema nodosum      Generalized osteoarthrosis, unspecified site     Hips     S/P total hip arthroplasty 04/04/2003    right     S/P total knee arthroplasty     left     Sleep apnea     on CPAP        Past Surgical History:   Procedure Laterality Date     COLONOSCOPY N/A 05/11/2016    normal, repeat 10 years     ESOPHAGOSCOPY, GASTROSCOPY, DUODENOSCOPY (EGD), COMBINED N/A 05/11/2016    Procedure: COMBINED ESOPHAGOSCOPY, GASTROSCOPY, DUODENOSCOPY (EGD), BIOPSY SINGLE OR MULTIPLE;  Surgeon: Ervin Johnston MD;  [facility]: [facility]     FINGER SURGERY Right 04/2019    ganglion cyst removed     PHACOEMULSIFICATION WITH STANDARD INTRAOCULAR LENS IMPLANT Left 10/7/2021    Procedure: PHACOEMULSIFICATION, CATARACT, WITH MULTIFOCAL INTRAOCULAR LENS IMPLANT INSERTION, left;  Surgeon: Jordan Collins MD;  Location: [facility]     PHACOEMULSIFICATION WITH STANDARD INTRAOCULAR LENS IMPLANT Right 10/21/2021    Procedure: PHACOEMULSIFICATION, CATARACT, WITH MULTIFOCAL INTRAOCULAR LENS IMPLANT INSERTION,right;  Surgeon: Brooks  Praveen Madrigal MD;  Location:  OR     [facility] TOTAL HIP ARTHROPLASTY Right 2004    Hip Replacement, Total     [facility] TOTAL KNEE ARTHROPLASTY Left      [facility]       Allergies   Allergen Reactions     Cephalexin Monohydrate      keflex     Gluten Meal GI Disturbance     Warfarin Sodium      coumadin         Current Outpatient Medications:      CALCIUM-MAGNESIUM-ZINC PO, Take 1 tablet by mouth daily Reported on 3/28/2017, Disp: , Rfl:      Cholecalciferol (VITAMIN D3 PO), Take by mouth daily, Disp: , Rfl:      IBUPROFEN PO, , Disp: , Rfl:      levothyroxine (SYNTHROID/LEVOTHROID) 75 MCG tablet, TAKE ONE TABLET BY MOUTH EVERY MORNING, Disp: 90 tablet, Rfl: 0     Omega-3 Fatty Acids (FISH OIL PO), , Disp: , Rfl:      simvastatin (ZOCOR) 10 MG tablet, TAKE ONE TABLET BY MOUTH AT BEDTIME, Disp: 90 tablet, Rfl: 3     ZYRTEC 10 MG OR TABS, ONE TABLET DAILY, Disp: 30, Rfl: 8     benzonatate (TESSALON) 100 MG capsule, Take 1 capsule (100 mg) by mouth 3 times daily as needed for cough (Patient not taking: Reported on 2022), Disp: 60 capsule, Rfl: 0     omeprazole (PRILOSEC OTC) 20 MG EC tablet, Take 1 tablet (20 mg) by mouth daily (Patient not taking: Reported on 2023), Disp: 90 tablet, Rfl: 1    Family History   Problem Relation Age of Onset     Cancer Father          of lung cancer     Heart Disease Daughter         2 holes in her heart       Social History     Socioeconomic History     Marital status:      Spouse name: Osbaldo     Number of children: 2     Years of education: Not on file     Highest education level: Not on file   Occupational History     Occupation: retired     Comment: speech pathologist   Tobacco Use     Smoking status: Never     Smokeless tobacco: Never   Vaping Use     Vaping status: Never Used   Substance and Sexual Activity     Alcohol use: Yes     Comment: 2 drinks per month     Drug use: No     Sexual activity: Yes     Partners: Male    Other Topics Concern      Service Not Asked     Blood Transfusions Not Asked     Caffeine Concern Not Asked     Occupational Exposure Not Asked     Hobby Hazards Not Asked     Sleep Concern Not Asked     Stress Concern Not Asked     Weight Concern Yes     Comment: going to lose 100 pounds     Special Diet Not Asked     Back Care Not Asked     Exercise Not Asked     Bike Helmet Not Asked     Seat Belt Not Asked     Self-Exams Not Asked     Parent/sibling w/ CABG, MI or angioplasty before 65F 55M? Not Asked   Social History Narrative    Lives at home with , 2 horses. 2 grown children. Son lives in [place], Dtr in [place]     Social Determinants of Health     Financial Resource Strain: Not on file   Food Insecurity: Not on file   Transportation Needs: Not on file   Physical Activity: Not on file   Stress: Not on file   Social Connections: Not on file   Intimate Partner Violence: Not on file   Housing Stability: Not on file       10 point Review of Systems is negative .     Pulse 87   Wt 142 kg (313 lb)   LMP 01/19/2005   SpO2 90%   BMI 43.35 kg/m      BMI= Body mass index is 43.35 kg/m .    OBJECTIVE:  General appearance: Patient is alert and fully cooperative with history & exam.  No sign of distress is noted during the visit.  Vascular: Dorsalis pedis and posterior tibial pulses are palpable. There is no pedal hair growth bilaterally.  CFT < 3 sec from anterior tibial surface to distal digits bilaterally. There is moderate  edema noted lateral aspect right foot.  Dermatologic: Turgor and texture are within normal limits.  Erythema noted lateral aspect right foot.  No primary or secondary lesions noted.  Neurologic: All epicritic and proprioceptive sensations are grossly intact bilaterally.  Musculoskeletal: All active and passive ankle, subtalar, midtarsal, and 1st MPJ range of motion are grossly intact without pain or crepitus, with the exception of none. Manual muscle strength is within  normal limits bilaterally. All dorsiflexors, plantarflexors, invertors, evertors are intact bilaterally. Tenderness present to the lateral aspect of the right foot on palpation.  Mild tenderness to the lateral aspect of the right foot with range of motion. Calf is soft/non-tender without warmth/induration    Imaging:         No results found.           Colten Sanon; REINIER  [facility] Foot & Ankle Surgery/Podiatry

## 2023-05-02 NOTE — PATIENT INSTRUCTIONS
GOUT  Gout is a disorder that results from the build-up of uric acid in the tissues or a joint. It most often affects the joint of the big toe.  CAUSES  Gout attacks are caused by deposits of crystallized uric acid in the joint. Uric acid is present in the blood and eliminated in the urine, but in people who have gout, uric acid accumulates and crystallizes in the joints. Uric acid is the result of the breakdown of purines, chemicals that are found naturally in our bodies and in food. Some people develop gout because their kidneys have difficulty eliminating normal amounts of uric acid, while others produce too much uric acid.  Gout occurs most commonly in the big toe because uric acid is sensitive to temperature changes. At cooler temperatures, uric acid turns into crystals. Since the toe is the part of the body that is farthest from the heart, it s also the coolest part of the body - and, thus, the most likely target of gout. However, gout can affect any joint in the body.  The tendency to accumulate uric acid is often inherited. Other factors that put a person at risk for developing gout include: high blood pressure, diabetes, obesity, surgery, chemotherapy, stress, and certain medications and vitamins. For example, the body s ability to remove uric acid can be negatively affected by taking aspirin, some diuretic medications ( water pills ), and the vitamin niacin (also called nicotinic acid). While gout is more common in men aged 40 to 60 years, it can occur in younger men as well as in women.  Consuming foods and beverages that contain high levels of purines can trigger an attack of gout. Some foods contain more purines than others and have been associated with an increase of uric acid, which leads to gout. You may be able to reduce your chances of getting a gout attack by limiting or avoiding shellfish, organ meats (kidney, liver, etc.), red wine, beer, and red meat.  Other Triggers include but are not limited  to:  Congestive heart failure, deep vein thrombosis, pneumonia, fasting, dehydration, trauma/surgery, psoriasis.  SYMTOMS  An attack of gout can be miserable, marked by the following symptoms:  Intense pain that comes on suddenly - often in the middle of the night or upon arising   Signs of inflammation such as redness, swelling, and warmth over the joint.   DIAGNOSIS  To diagnose gout, the foot and ankle surgeon will ask questions about your personal and family medical history, followed by an examination of the affected joint. Laboratory tests and x-rays are sometimes ordered to determine if the inflammation is caused by something other than gout.  TREATMENT  Initial treatment of an attack of gout typically includes the following:  Medications. Prescription medications or injections are used to treat the pain, swelling, and inflammation.   Dietary restrictions. Foods and beverages that are high in purines should be avoided, since purines are converted in the body to uric acid.   Fluids. Drink plenty of water and other fluids each day, while also avoiding alcoholic beverages, which cause dehydration. Cherry Juice works well to help decrease pain.  Immobilize and elevate the foot. Avoid standing and walking to give your foot a rest. Also, elevate your foot (level with or slightly above the heart) to help reduce swelling.   The symptoms of gout and the inflammatory process usually resolve in three to ten days with treatment. If gout symptoms continue despite the initial treatment, or if repeated attacks occur, see your primary care physician for maintenance treatment that may involve daily medication. In cases of repeated episodes, the underlying problem must be addressed, as the build-up of uric acid over time can cause arthritic damage to the joint.  DIET CHANGE  A gout diet reduces your intake of foods that are high in purines, which helps control your body's production of uric acid. If you're overweight or obese,  lose weight. However, avoid fasting and rapid weight loss because these can promote a gout attack. Drink plenty of fluids to help flush uric acid from your body. Also avoid high-protein diets, which can cause you to produce too much uric acid (hyperuricemia).   To follow the diet:   Limit meat, poultry and fish. Animal proteins are high in purine. Avoid or severely limit high-purine foods, such as organ meats, herring, anchovies and mackerel. Red meat (beef, pork and lamb), fatty fish and seafood (tuna, shrimp, lobster and scallops) are associated with increased risk of gout. Because all meat, poultry and fish contain purines, limit your intake to 4 to 6 ounces (113 to 170 grams) daily.   Eat more plant-based proteins. You can increase your protein by including more plant-based sources, such as beans and legumes. This switch will also help you cut down on saturated fats, which may indirectly contribute to obesity and gout.   Limit or avoid alcohol. Alcohol interferes with the elimination of uric acid from your body. Drinking beer, in particular, has been linked to gout attacks. If you're having an attack, avoid alcohol. However, when you're not having an attack, drinking one or two 5-ounce (148 milliliter) servings a day of wine is not likely to increase your risk.   Drink plenty of fluids, particularly water. Fluids can help remove uric acid from your body. Aim for eight to 16 8-ounce (237 milliliter) glasses a day.   Choose low-fat or fat-free dairy products. Some studies have shown that drinking skim or low-fat milk and eating foods made with them, such as yogurt, help reduce the risk of gout. Aim for adequate dairy intake of 16 to 24 fluid ounces (473 to 710 milliliters) daily.   Choose complex carbohydrates. Eat more whole grains and fruits and vegetables and fewer refined carbohydrates, such as white bread, cakes and candy.   Limit or avoid sugar. Too many sweets can leave you with no room for plant-based  proteins and low-fat or fat-free dairy products -- the foods you need to avoid gout. Sugary foods also tend to be high in calories, so they make it easier to eat more than you're likely to burn off. Although there's debate about whether sugar has a direct effect on uric acid levels, sweets are definitely linked to overweight and obesity.   There's also some evidence that drinking four to six cups of coffee a day lowers gout risk in men.   RESULTS  Following a gout diet can help you limit your body's uric acid production and increase its elimination. It's not likely to lower the uric acid concentration in your blood enough to treat your gout without medication, but it may help decrease the number of attacks and limit their severity. Following the gout diet and limiting your calories -- particularly if you also add in moderate daily exercise, such as brisk walking -- also can improve your overall health by helping you achieve and maintain a healthy weight.

## 2023-05-02 NOTE — LETTER
5/2/2023         RE: Ashli Rogers  [address]  [facility] [address]        Dear Colleague,    Thank you for referring your patient, Ashli Rogers, to the [facility]. Please see a copy of my visit note below.    FOOT AND ANKLE SURGERY/PODIATRY Progress Note        ASSESSMENT:   Acute gout right foot    HPI: Ashli Rogers was seen again today complaining of severe pain, redness and swelling on the lateral aspect of the right foot x1 week.  The patient stated she had a very sudden onset of pain.  The pain is aggravated with weightbearing, ambulation and shoe gear.  She is unable to wear normal shoes because of the pain.  She has pain even while resting at night.  She stated that she was unable to sleep at least 1 night because of the pain.  She denies any trauma to her foot.  She has been taking ibuprofen which does give her some relief.      Past Medical History:   Diagnosis Date     Cataracts, bilateral      Closed fracture of medial malleolus     Distal avulsion fracture of the medial malleoli     Diverticulitis of colon 2/3/2017     DJD (degenerative joint disease) 05/24/2011    right knee, s/p injections     Erythema nodosum      Generalized osteoarthrosis, unspecified site     Hips     S/P total hip arthroplasty 04/04/2003    right     S/P total knee arthroplasty     left     Sleep apnea     on CPAP        Past Surgical History:   Procedure Laterality Date     COLONOSCOPY N/A 05/11/2016    normal, repeat 10 years     ESOPHAGOSCOPY, GASTROSCOPY, DUODENOSCOPY (EGD), COMBINED N/A 05/11/2016    Procedure: COMBINED ESOPHAGOSCOPY, GASTROSCOPY, DUODENOSCOPY (EGD), BIOPSY SINGLE OR MULTIPLE;  Surgeon: Ervin Johnston MD;  Location:  GI     FINGER SURGERY Right 04/2019    ganglion cyst removed     PHACOEMULSIFICATION WITH STANDARD INTRAOCULAR LENS IMPLANT Left 10/7/2021    Procedure: PHACOEMULSIFICATION, CATARACT, WITH MULTIFOCAL INTRAOCULAR LENS IMPLANT INSERTION, left;  Surgeon:  Jordan Collins MD;  Location: [facility] OR     PHACOEMULSIFICATION WITH STANDARD INTRAOCULAR LENS IMPLANT Right 10/21/2021    Procedure: PHACOEMULSIFICATION, CATARACT, WITH MULTIFOCAL INTRAOCULAR LENS IMPLANT INSERTION,right;  Surgeon: Praveen Guy MD;  Location: [facility] OR     ZZC TOTAL HIP ARTHROPLASTY Right 2004    Hip Replacement, Total     ZZC TOTAL KNEE ARTHROPLASTY Left      ZZHC COLONOSCOPY W/WO BRUSH/WASH  2006       Allergies   Allergen Reactions     Cephalexin Monohydrate      keflex     Gluten Meal GI Disturbance     Warfarin Sodium      coumadin         Current Outpatient Medications:      CALCIUM-MAGNESIUM-ZINC PO, Take 1 tablet by mouth daily Reported on 3/28/2017, Disp: , Rfl:      Cholecalciferol (VITAMIN D3 PO), Take by mouth daily, Disp: , Rfl:      IBUPROFEN PO, , Disp: , Rfl:      levothyroxine (SYNTHROID/LEVOTHROID) 75 MCG tablet, TAKE ONE TABLET BY MOUTH EVERY MORNING, Disp: 90 tablet, Rfl: 0     Omega-3 Fatty Acids (FISH OIL PO), , Disp: , Rfl:      simvastatin (ZOCOR) 10 MG tablet, TAKE ONE TABLET BY MOUTH AT BEDTIME, Disp: 90 tablet, Rfl: 3     ZYRTEC 10 MG OR TABS, ONE TABLET DAILY, Disp: 30, Rfl: 8     benzonatate (TESSALON) 100 MG capsule, Take 1 capsule (100 mg) by mouth 3 times daily as needed for cough (Patient not taking: Reported on 2022), Disp: 60 capsule, Rfl: 0     omeprazole (PRILOSEC OTC) 20 MG EC tablet, Take 1 tablet (20 mg) by mouth daily (Patient not taking: Reported on 2023), Disp: 90 tablet, Rfl: 1    Family History   Problem Relation Age of Onset     Cancer Father          of lung cancer     Heart Disease Daughter         2 holes in her heart       Social History     Socioeconomic History     Marital status:      Spouse name: Osbaldo     Number of children: 2     Years of education: Not on file     Highest education level: Not on file   Occupational History     Occupation: retired     Comment: speech pathologist   Tobacco Use      Smoking status: Never     Smokeless tobacco: Never   Vaping Use     Vaping status: Never Used   Substance and Sexual Activity     Alcohol use: Yes     Comment: 2 drinks per month     Drug use: No     Sexual activity: Yes     Partners: Male   Other Topics Concern      Service Not Asked     Blood Transfusions Not Asked     Caffeine Concern Not Asked     Occupational Exposure Not Asked     Hobby Hazards Not Asked     Sleep Concern Not Asked     Stress Concern Not Asked     Weight Concern Yes     Comment: going to lose 100 pounds     Special Diet Not Asked     Back Care Not Asked     Exercise Not Asked     Bike Helmet Not Asked     Seat Belt Not Asked     Self-Exams Not Asked     Parent/sibling w/ CABG, MI or angioplasty before 65F 55M? Not Asked   Social History Narrative    Lives at home with , 2 horses. 2 grown children. Son lives in [place], Dtr in [place]     Social [facility]     Financial Resource Strain: Not on file   Food Insecurity: Not on file   Transportation Needs: Not on file   Physical Activity: Not on file   Stress: Not on file   Social Connections: Not on file   Intimate Partner Violence: Not on file   Housing Stability: Not on file       10 point Review of Systems is negative .     Pulse 87   Wt 142 kg (313 lb)   LMP 01/19/2005   SpO2 90%   BMI 43.35 kg/m      BMI= Body mass index is 43.35 kg/m .    OBJECTIVE:  General appearance: Patient is alert and fully cooperative with history & exam.  No sign of distress is noted during the visit.  Vascular: Dorsalis pedis and posterior tibial pulses are palpable. There is no pedal hair growth bilaterally.  CFT < 3 sec from anterior tibial surface to distal digits bilaterally. There is moderate  edema noted lateral aspect right foot.  Dermatologic: Turgor and texture are within normal limits.  Erythema noted lateral aspect right foot.  No primary or secondary lesions noted.  Neurologic: All epicritic and proprioceptive  sensations are grossly intact bilaterally.  Musculoskeletal: All active and passive ankle, subtalar, midtarsal, and 1st MPJ range of motion are grossly intact without pain or crepitus, with the exception of none. Manual muscle strength is within normal limits bilaterally. All dorsiflexors, plantarflexors, invertors, evertors are intact bilaterally. Tenderness present to the lateral aspect of the right foot on palpation.  Mild tenderness to the lateral aspect of the right foot with range of motion. Calf is soft/non-tender without warmth/induration    Imaging:         No results found.           Colten Wan DPM  [facility] Foot & Ankle Surgery/Podiatry         Again, thank you for allowing me to participate in the care of your patient.        Sincerely,        Colten Sanon DPM

## 2023-05-05 ENCOUNTER — VIRTUAL VISIT (OUTPATIENT)
Dept: FAMILY MEDICINE | Facility: CLINIC | Age: 68
End: 2023-05-05
Payer: COMMERCIAL

## 2023-05-05 DIAGNOSIS — R55 NEAR SYNCOPE: ICD-10-CM

## 2023-05-05 DIAGNOSIS — N39.46 MIXED STRESS AND URGE URINARY INCONTINENCE: ICD-10-CM

## 2023-05-05 DIAGNOSIS — K44.9 HIATAL HERNIA: ICD-10-CM

## 2023-05-05 DIAGNOSIS — E66.01 MORBID OBESITY (H): Primary | ICD-10-CM

## 2023-05-05 DIAGNOSIS — G47.33 OSA (OBSTRUCTIVE SLEEP APNEA): ICD-10-CM

## 2023-05-05 DIAGNOSIS — E03.8 SUBCLINICAL HYPOTHYROIDISM: ICD-10-CM

## 2023-05-05 DIAGNOSIS — R73.03 PREDIABETES: ICD-10-CM

## 2023-05-05 DIAGNOSIS — M10.071 ACUTE IDIOPATHIC GOUT OF RIGHT FOOT: ICD-10-CM

## 2023-05-05 DIAGNOSIS — E78.5 HYPERLIPIDEMIA LDL GOAL <130: ICD-10-CM

## 2023-05-05 DIAGNOSIS — K21.9 GASTROESOPHAGEAL REFLUX DISEASE WITHOUT ESOPHAGITIS: ICD-10-CM

## 2023-05-05 DIAGNOSIS — R73.01 ELEVATED FASTING GLUCOSE: ICD-10-CM

## 2023-05-05 PROCEDURE — 99214 OFFICE O/P EST MOD 30 MIN: CPT | Mod: VID | Performed by: FAMILY MEDICINE

## 2023-05-05 NOTE — PROGRESS NOTES
Ashli is a 68 year old who is being evaluated via a billable video visit.     Video visit performed in lieu of an in-person visit due to current concerns regarding social distancing and keeping people safe.  Physician and patient agreed this was appropriate for concerns addressed.     How would you like to obtain your AVS? MyChart  If the video visit is dropped, the invitation should be resent by: Text to cell phone: [phone]  Will anyone else be joining your video visit? No        Assessment & Plan       ICD-10-CM    1. Morbid obesity (H)  E66.01 Comprehensive metabolic panel (BMP + Alb, Alk Phos, ALT, AST, Total. Bili, TP)     Hemoglobin A1c      2. Mixed stress and urge urinary incontinence  N39.46 solifenacin (VESICARE) 5 MG tablet     Comprehensive metabolic panel (BMP + Alb, Alk Phos, ALT, AST, Total. Bili, TP)      3. Acute idiopathic gout of right foot  M10.071 Uric acid     Comprehensive metabolic panel (BMP + Alb, Alk Phos, ALT, AST, Total. Bili, TP)      4. ZELDA (obstructive sleep apnea)  G47.33 Adult Sleep Eval & Management Referral      5. Gastroesophageal reflux disease without esophagitis  K21.9 Comprehensive metabolic panel (BMP + Alb, Alk Phos, ALT, AST, Total. Bili, TP)     omeprazole (PRILOSEC) 20 MG DR capsule      6. Near syncope  R55 Comprehensive metabolic panel (BMP + Alb, Alk Phos, ALT, AST, Total. Bili, TP)      7. Hiatal hernia  K44.9 Comprehensive metabolic panel (BMP + Alb, Alk Phos, ALT, AST, Total. Bili, TP)     omeprazole (PRILOSEC) 20 MG DR capsule      8. Hyperlipidemia LDL goal <130  E78.5 Comprehensive metabolic panel (BMP + Alb, Alk Phos, ALT, AST, Total. Bili, TP)     Lipid panel reflex to direct LDL Fasting      9. Subclinical hypothyroidism  E03.8 TSH with free T4 reflex      10. Elevated fasting glucose  R73.01 Hemoglobin A1c         I would like her to have a uric acid level checked. She will make a lab appointment. If this is elevated, then putting her on allopurinol  "might help with her symptoms.     For her weight loss, we discussed the need to be strict about her diet and I highly encouraged her to try WW again. She needs to follow it closely though, not cheat. If this is not successful, then referral to comprehensive weight management clinic is recommended to see if we can get approval for medication such as semaglutide.     For her incontinence, I suspect it was more the oxybutynin that caused the dryness more than the flomax or vesicare. will try her back on Vesicare. See orders. If no help, might consider myrbetriq or refer back to urology. Discussed other possible side effects including dizzyness, fall risk, urine retention.     For the ZELDA I recommend she see Dr. Wilder back for consideration of Inspire or other alternate recommendations.     She may use omeprazole, see orders. Will monitor kidney function.     For the near syncope, I don't have an answer for what caused that, but would recommend not using marijuana and ETOH together for certain. Will be getting her full lab panel as she is due for medication follow up for her thyroid, lipids, CMP and A1C due to her medication use, previous elevated glucose and obesity.     40 minutes spent by me on the date of the encounter doing chart review, history and exam, documentation and further activities per the note     BMI:   Estimated body mass index is 43.35 kg/m  as calculated from the following:    Height as of 7/5/22: 1.81 m (5' 11.25\").    Weight as of 5/2/23: 142 kg (313 lb).   Weight management plan: Specific weight management program called WW discussed      Zelda Garcia MD  [facility]    Vish Cornelius is a 68 year old, presenting for the following health issues:  History of Present Illness  Would like to confirm gout diagnosis and medication questions  Acid reflux medication recommendation  LOC- 1 close episode and 1 Full LOC episode    List is prioritized in the " order patient has more concerns for.    -Weight loss: 1/21 tried a probiotic for 6 months without benefit. Changed to a keto supplement, still no help. She then switched to intermittent fasting on Mondays, no help. She would like to lose weight, wondering what else might help her. She did WW in the past successfully when she was strict.   -Gout: was recently seen by a podiatrist, placed on indocin for probable gout. Wondering if this will cure it or if just for pain, and what else can be done about it.   -Incontinence: 10-15 years ago was given something for her bladder, but it caused dry mouth and vagina. Otherwise no symptoms and she would like to try it again. Has mixed urinary incontinence and she thinks it will be worth it to deal with dry mouth and vagina if the medication can be helpful. bowelse are normal. In looking through epic, she was treated in the past with oxybutynin as well as tamsulosin and vesicare.   -Acid Reflux: has a history of GERD. Has tried alkaseltzer and TUMS without benefit. Would like to know if she can use prilosec and for how long.   -LOC, hubby and her out on Valentines, had some marijuana and ETOH but no more than used to, had eaten but felt faint, lost consciousness for about 1 minute. Never fell, maintained her upright position but slumped a little. Woke up and felt fine, no loss of urine or bowel control.   -Cpap - wondering if anything new. Doesn't like her CPAP, has heard about implantable device.          View : No data to display.              History of Present Illness       Reason for visit:  Chronic Condition Follow up    She eats 2-3 servings of fruits and vegetables daily.She consumes 0 sweetened beverage(s) daily.She exercises with enough effort to increase her heart rate 60 or more minutes per day.  She exercises with enough effort to increase her heart rate 4 days per week. She is missing 1 dose(s) of medications per week.  She is not taking prescribed medications  regularly due to remembering to take.       Review of Systems   Constitutional, HEENT, cardiovascular, pulmonary, gi and gu systems are negative, except as otherwise noted.      Objective           Vitals:  No vitals were obtained today due to virtual visit.    Physical Exam   GENERAL: Healthy, alert and no distress  EYES: Eyes grossly normal to inspection.  No discharge or erythema, or obvious scleral/conjunctival abnormalities.  RESP: No audible wheeze, cough, or visible cyanosis.  No visible retractions or increased work of breathing.    SKIN: Visible skin clear. No significant rash, abnormal pigmentation or lesions.  NEURO: Cranial nerves grossly intact.  Mentation and speech appropriate for age.  PSYCH: Mentation appears normal, affect normal/bright, judgement and insight intact, normal speech and appearance well-groomed.    Orders Placed This Encounter   Procedures     Uric acid     TSH with free T4 reflex     Comprehensive metabolic panel (BMP + Alb, Alk Phos, ALT, AST, Total. Bili, TP)     Lipid panel reflex to direct LDL Fasting     Hemoglobin A1c     Adult Sleep Eval & Management Referral          Video-Visit Details    Type of service:  Video Visit   Video Start Time: 2:15 pm  Video End Time:2:44 pm    Originating Location (pt. Location): Home  Distant Location (provider location):  On-site  Platform used for Video Visit: [facility]

## 2023-05-07 RX ORDER — SOLIFENACIN SUCCINATE 5 MG/1
5 TABLET, FILM COATED ORAL DAILY
Qty: 30 TABLET | Refills: 3 | Status: SHIPPED | OUTPATIENT
Start: 2023-05-07 | End: 2023-10-09

## 2023-05-09 ENCOUNTER — LAB (OUTPATIENT)
Dept: LAB | Facility: CLINIC | Age: 68
End: 2023-05-09
Payer: COMMERCIAL

## 2023-05-09 DIAGNOSIS — E78.5 HYPERLIPIDEMIA LDL GOAL <130: ICD-10-CM

## 2023-05-09 DIAGNOSIS — K21.9 GASTROESOPHAGEAL REFLUX DISEASE WITHOUT ESOPHAGITIS: ICD-10-CM

## 2023-05-09 DIAGNOSIS — E66.01 MORBID OBESITY (H): ICD-10-CM

## 2023-05-09 DIAGNOSIS — N39.46 MIXED STRESS AND URGE URINARY INCONTINENCE: ICD-10-CM

## 2023-05-09 DIAGNOSIS — R55 NEAR SYNCOPE: ICD-10-CM

## 2023-05-09 DIAGNOSIS — R73.01 ELEVATED FASTING GLUCOSE: ICD-10-CM

## 2023-05-09 DIAGNOSIS — M10.071 ACUTE IDIOPATHIC GOUT OF RIGHT FOOT: ICD-10-CM

## 2023-05-09 DIAGNOSIS — E03.8 SUBCLINICAL HYPOTHYROIDISM: ICD-10-CM

## 2023-05-09 DIAGNOSIS — K44.9 HIATAL HERNIA: ICD-10-CM

## 2023-05-09 LAB
ALBUMIN SERPL BCG-MCNC: 4.3 G/DL (ref 3.5–5.2)
ALP SERPL-CCNC: 99 U/L (ref 35–104)
ALT SERPL W P-5'-P-CCNC: 24 U/L (ref 10–35)
ANION GAP SERPL CALCULATED.3IONS-SCNC: 12 MMOL/L (ref 7–15)
AST SERPL W P-5'-P-CCNC: 23 U/L (ref 10–35)
BILIRUB SERPL-MCNC: 0.3 MG/DL
BUN SERPL-MCNC: 17.2 MG/DL (ref 8–23)
CALCIUM SERPL-MCNC: 9.3 MG/DL (ref 8.8–10.2)
CHLORIDE SERPL-SCNC: 104 MMOL/L (ref 98–107)
CHOLEST SERPL-MCNC: 181 MG/DL
CREAT SERPL-MCNC: 0.8 MG/DL (ref 0.51–0.95)
DEPRECATED HCO3 PLAS-SCNC: 26 MMOL/L (ref 22–29)
GFR SERPL CREATININE-BSD FRML MDRD: 79 ML/MIN/1.73M2
GLUCOSE SERPL-MCNC: 113 MG/DL (ref 70–99)
HBA1C MFR BLD: 5.7 %
HDLC SERPL-MCNC: 59 MG/DL
LDLC SERPL CALC-MCNC: 103 MG/DL
NONHDLC SERPL-MCNC: 122 MG/DL
POTASSIUM SERPL-SCNC: 4.4 MMOL/L (ref 3.4–5.3)
PROT SERPL-MCNC: 8 G/DL (ref 6.4–8.3)
SODIUM SERPL-SCNC: 142 MMOL/L (ref 136–145)
T4 FREE SERPL-MCNC: 1.01 NG/DL (ref 0.9–1.7)
TRIGL SERPL-MCNC: 95 MG/DL
TSH SERPL DL<=0.005 MIU/L-ACNC: 4.57 UIU/ML (ref 0.3–4.2)
URATE SERPL-MCNC: 5.4 MG/DL (ref 2.4–5.7)

## 2023-05-09 PROCEDURE — 84439 ASSAY OF FREE THYROXINE: CPT

## 2023-05-09 PROCEDURE — 83036 HEMOGLOBIN GLYCOSYLATED A1C: CPT

## 2023-05-09 PROCEDURE — 84443 ASSAY THYROID STIM HORMONE: CPT

## 2023-05-09 PROCEDURE — 84550 ASSAY OF BLOOD/URIC ACID: CPT

## 2023-05-09 PROCEDURE — 80061 LIPID PANEL: CPT

## 2023-05-09 PROCEDURE — 36415 COLL VENOUS BLD VENIPUNCTURE: CPT

## 2023-05-09 PROCEDURE — 80053 COMPREHEN METABOLIC PANEL: CPT

## 2023-05-11 ENCOUNTER — PATIENT OUTREACH (OUTPATIENT)
Dept: CARE COORDINATION | Facility: CLINIC | Age: 68
End: 2023-05-11
Payer: COMMERCIAL

## 2023-05-11 PROBLEM — R73.03 PREDIABETES: Status: ACTIVE | Noted: 2023-05-11

## 2023-05-11 RX ORDER — LEVOTHYROXINE SODIUM 88 UG/1
88 TABLET ORAL DAILY
Qty: 90 TABLET | Refills: 0 | Status: SHIPPED | OUTPATIENT
Start: 2023-05-11 | End: 2023-08-24

## 2023-05-11 NOTE — RESULT ENCOUNTER NOTE
Ashli, here are your lab results.  Your thyroid is slightly underactive.  I would like to raise your dose just a little bit.  This may actually help you with weight loss also.  Your cholesterol is not too bad.  Very close to goal and I recommend you continue your medication.  Your sugar is a little high but your diabetes test is shows you are prediabetic.  This will likely improve in time with weight loss and healthy diet but we need to follow this closely.  I would like to refer you to the diabetes educator for teaching about how to get this under better control.  This will also likely help you with weight loss.  Your liver and kidneys are all normal.  Your uric acid level is not elevated, so starting you on allopurinol for gout is not likely going to be beneficial.  I am sending in a new prescription for your thyroid medication to the pharmacy.  After 2 months on the new dose I would like you to have a lab visit only to recheck your level and see if it is better.  Zelda Garcia MD

## 2023-05-26 DIAGNOSIS — E78.5 HYPERLIPIDEMIA LDL GOAL <130: ICD-10-CM

## 2023-05-30 RX ORDER — SIMVASTATIN 10 MG
TABLET ORAL
Qty: 90 TABLET | Refills: 3 | Status: SHIPPED | OUTPATIENT
Start: 2023-05-30 | End: 2024-07-25

## 2023-05-30 NOTE — TELEPHONE ENCOUNTER
Prescription approved per [facility] Refill Protocol.  Rita Saab RN on 5/30/2023 at 3:56 PM

## 2023-06-12 ENCOUNTER — OFFICE VISIT (OUTPATIENT)
Dept: PODIATRY | Age: 68
End: 2023-06-12

## 2023-06-12 DIAGNOSIS — B35.1 ONYCHOMYCOSIS: ICD-10-CM

## 2023-06-12 DIAGNOSIS — L60.0 INGROWN NAIL: ICD-10-CM

## 2023-06-12 DIAGNOSIS — M21.6X2 ACQUIRED EQUINUS DEFORMITY OF BOTH FEET: ICD-10-CM

## 2023-06-12 DIAGNOSIS — M21.6X1 ACQUIRED EQUINUS DEFORMITY OF BOTH FEET: ICD-10-CM

## 2023-06-12 DIAGNOSIS — B35.1 TOENAIL FUNGUS: Primary | ICD-10-CM

## 2023-06-12 DIAGNOSIS — M20.40 HAMMER TOE, UNSPECIFIED LATERALITY: ICD-10-CM

## 2023-06-12 PROCEDURE — 99213 OFFICE O/P EST LOW 20 MIN: CPT | Performed by: PODIATRIST

## 2023-06-15 PROBLEM — M20.40 HAMMER TOE: Status: ACTIVE | Noted: 2023-06-15

## 2023-06-15 PROBLEM — L60.0 INGROWN NAIL: Status: ACTIVE | Noted: 2023-06-15

## 2023-06-15 PROBLEM — M21.6X2 ACQUIRED EQUINUS DEFORMITY OF BOTH FEET: Status: ACTIVE | Noted: 2023-06-15

## 2023-06-15 PROBLEM — B35.1 TOENAIL FUNGUS: Status: ACTIVE | Noted: 2023-06-15

## 2023-06-15 PROBLEM — M21.6X1 ACQUIRED EQUINUS DEFORMITY OF BOTH FEET: Status: ACTIVE | Noted: 2023-06-15

## 2023-06-15 ASSESSMENT — ENCOUNTER SYMPTOMS
COLOR CHANGE: 1
ACTIVITY CHANGE: 1

## 2023-06-20 RX ORDER — LOSARTAN POTASSIUM 100 MG/1
100 TABLET ORAL DAILY
Qty: 90 TABLET | Refills: 1 | Status: SHIPPED | OUTPATIENT
Start: 2023-06-20

## 2023-06-23 ENCOUNTER — VIRTUAL VISIT (OUTPATIENT)
Dept: EDUCATION SERVICES | Facility: CLINIC | Age: 68
End: 2023-06-23
Attending: FAMILY MEDICINE
Payer: COMMERCIAL

## 2023-06-23 DIAGNOSIS — R73.03 PREDIABETES: ICD-10-CM

## 2023-06-23 PROCEDURE — 97802 MEDICAL NUTRITION INDIV IN: CPT | Mod: 93 | Performed by: DIETITIAN, REGISTERED

## 2023-06-23 NOTE — LETTER
"    6/23/2023         RE: Ashli Rogers  [address]  [facility] [address]        Dear Colleague,    Thank you for referring your patient, Ashli Rogers, to the [facility] [place]. Please see a copy of my visit note below.    Medical Nutrition Therapy  Visit Type:Initial assessment and intervention    Type of Service: Telephone Visit - scheduled as video but converted to phone due tech issues     Audio only visit done, as patient does not have access to audio-visual technology.    Individual visit provided, given no group classes are available for 2 months.     Originating Location (Patient Location): Home  Distant Location (Provider Location): Offsite  Mode of Communication:  Telephone    Telephone Visit Start Time: 8:20a  Telephone Visit End Time (telephone visit stop time): 8:49a    How would patient like to obtain AVS? Angelo      Ashli Rogers presents today for MNT and education related to prediabetes.   She is accompanied by self.     ASSESSMENT:   Patient comments/concerns relating to nutrition: Read message regarding non-coverage of MNT visit for prediabetes and states she has money, is willing to cash pay for this visit. Will send ABN and request she sign and return as this is a virtual visit.   Ashli has already started using Noom to help support blood sugar control, weight loss. She is very motivated to make healthy changes. Goal in Noom is to increase \"green\" foods to >40%, using their category system. Encouraged her to be mindful of portion control, particularly with carbohydrates. Discussed pathophysiology of T2DM and tools to avoid or delay diabetes progression. She is tracking meals and snacks. She states her goal is 100lb weight loss x 1 year - we discussed 5-7% weight loss at minimum for prediabetes. She feels she is already quite active and is somewhat limited due to artificial hip & knees, gout. Tries to move more.     NUTRITION HISTORY:    Use Noom to adjust diet, increase " "\"green\" foods. Depending on the day may eat just breakfast & dinner but understands the importance of avoiding meal skipping and eating on a consistent meal plan. She likes to eat sugary foods and feels she drinks too much alcohol but has decreased a bit and switch to white wine from [employer]. Working on healthier foods, in general.     Misses meals? regularly    Previous diet education:  No     EXERCISE: no regular exercise program - can walk about 1 mile, enjoys water aerobics & swimming, has horses and takes care of them - rides and shovels stables    SOCIO/ECONOMIC:   Lives with: not assessed    MEDICATIONS:  Current Outpatient Medications   Medication     benzonatate (TESSALON) 100 MG capsule     CALCIUM-MAGNESIUM-ZINC PO     Cholecalciferol (VITAMIN D3 PO)     IBUPROFEN PO     indomethacin (INDOCIN) 50 MG capsule     levothyroxine (SYNTHROID/LEVOTHROID) 75 MCG tablet     levothyroxine (SYNTHROID/LEVOTHROID) 88 MCG tablet     Omega-3 Fatty Acids (FISH OIL PO)     omeprazole (PRILOSEC OTC) 20 MG EC tablet     omeprazole (PRILOSEC) 20 MG DR capsule     simvastatin (ZOCOR) 10 MG tablet     solifenacin (VESICARE) 5 MG tablet     ZYRTEC 10 MG OR TABS     No current facility-administered medications for this visit.       LABS:  Lab Results   Component Value Date     05/09/2023     09/28/2020      Lab Results   Component Value Date    POTASSIUM 4.4 05/09/2023    POTASSIUM 4.8 05/27/2022    POTASSIUM 4.0 09/28/2020     Lab Results   Component Value Date    CHLORIDE 104 05/09/2023    CHLORIDE 110 05/27/2022    CHLORIDE 106 09/28/2020     Lab Results   Component Value Date    EDUARDO 9.3 05/09/2023    EDUARDO 9.7 09/28/2020     Lab Results   Component Value Date    CO2 26 05/09/2023    CO2 30 05/27/2022    CO2 30 09/28/2020     Lab Results   Component Value Date    BUN 17.2 05/09/2023    BUN 12 05/27/2022    BUN 11 09/28/2020     Lab Results   Component Value Date    CR 0.80 05/09/2023    CR 0.86 09/28/2020     Lab " Results   Component Value Date     05/09/2023     05/27/2022    GLC 94 09/28/2020     Lab Results   Component Value Date     05/09/2023    LDL 76 03/17/2021     HDL Cholesterol   Date Value Ref Range Status   03/17/2021 66 >49 mg/dL Final     Direct Measure HDL   Date Value Ref Range Status   05/09/2023 59 >=50 mg/dL Final   ]  GFR Estimate   Date Value Ref Range Status   05/09/2023 79 >60 mL/min/1.73m2 Final     Comment:     eGFR calculated using 2021 CKD-EPI equation.   09/28/2020 70 >60 mL/min/[1.73_m2] Final     Comment:     Non  GFR Calc  Starting 12/18/2018, serum creatinine based estimated GFR (eGFR) will be   calculated using the Chronic Kidney Disease Epidemiology Collaboration   (CKD-EPI) equation.       Lab Results   Component Value Date    CR 0.80 05/09/2023    CR 0.86 09/28/2020     No results found for: MICROALBUMIN    ANTHROPOMETRICS:  Vitals: [facility] 01/19/2005   There is no height or weight on file to calculate BMI.      Wt Readings from Last 5 Encounters:   05/02/23 142 kg (313 lb)   07/05/22 143.3 kg (316 lb)   05/27/22 142 kg (313 lb)   11/24/21 144 kg (317 lb 8 oz)   10/21/21 145.3 kg (320 lb 6.4 oz)       Weight Change: no significant weight change x 2 years     ESTIMATED KCAL REQUIREMENTS:  5794-8074 kcal per day, based on REE for weight loss     NUTRITION DIAGNOSIS: Food- and nutrition-related knowledge deficit related to no previous diet education as evidenced by patient report    NUTRITION INTERVENTION:  Education given to support: general nutrition guidelines, weight reduction, consistent meals, exercise and portion control  Education Materials Provided: My Plate Planner/Choose My Plate, Weight Loss Tips and Prediabetes info - will send next week from clinic  Motivational Interviewing    PATIENT'S BEHAVIOR CHANGE GOALS:   Continue with Noom   Work on consistent weight loss over time, 1-2 lbs/week on average    MONITOR / EVALUATE:  RD will  monitor/evaluate:  Blood Glucose / A1c  Weight change    FOLLOW-UP:  Follow up with RD as needed.    Elvie Pagan RD, LD, [facility]   Time spent in minutes: 29  Encounter: Individual

## 2023-06-23 NOTE — PROGRESS NOTES
"Medical Nutrition Therapy  Visit Type:Initial assessment and intervention    Type of Service: Telephone Visit - scheduled as video but converted to phone due tech issues     Audio only visit done, as patient does not have access to audio-visual technology.    Individual visit provided, given no group classes are available for 2 months.     Originating Location (Patient Location): Home  Distant Location (Provider Location): Offsite  Mode of Communication:  Telephone    Telephone Visit Start Time: 8:20a  Telephone Visit End Time (telephone visit stop time): 8:49a    How would patient like to obtain AVS? MyChart      Ashli Rogers presents today for MNT and education related to prediabetes.   She is accompanied by self.     ASSESSMENT:   Patient comments/concerns relating to nutrition: Read message regarding non-coverage of MNT visit for prediabetes and states she has money, is willing to cash pay for this visit. Will send ABN and request she sign and return as this is a virtual visit.   Ashli has already started using Noom to help support blood sugar control, weight loss. She is very motivated to make healthy changes. Goal in Noom is to increase \"green\" foods to >40%, using their category system. Encouraged her to be mindful of portion control, particularly with carbohydrates. Discussed pathophysiology of T2DM and tools to avoid or delay diabetes progression. She is tracking meals and snacks. She states her goal is 100lb weight loss x 1 year - we discussed 5-7% weight loss at minimum for prediabetes. She feels she is already quite active and is somewhat limited due to artificial hip & knees, gout. Tries to move more.     NUTRITION HISTORY:    Use Noom to adjust diet, increase \"green\" foods. Depending on the day may eat just breakfast & dinner but understands the importance of avoiding meal skipping and eating on a consistent meal plan. She likes to eat sugary foods and feels she drinks too much alcohol but has decreased " a bit and switch to white wine from [employer]. Working on healthier foods, in general.     Misses meals? regularly    Previous diet education:  No     EXERCISE: no regular exercise program - can walk about 1 mile, enjoys water aerobics & swimming, has horses and takes care of them - rides and shovels stables    SOCIO/ECONOMIC:   Lives with: not assessed    MEDICATIONS:  Current Outpatient Medications   Medication     benzonatate (TESSALON) 100 MG capsule     CALCIUM-MAGNESIUM-ZINC PO     Cholecalciferol (VITAMIN D3 PO)     IBUPROFEN PO     indomethacin (INDOCIN) 50 MG capsule     levothyroxine (SYNTHROID/LEVOTHROID) 75 MCG tablet     levothyroxine (SYNTHROID/LEVOTHROID) 88 MCG tablet     Omega-3 Fatty Acids (FISH OIL PO)     omeprazole (PRILOSEC OTC) 20 MG EC tablet     omeprazole (PRILOSEC) 20 MG DR capsule     simvastatin (ZOCOR) 10 MG tablet     solifenacin (VESICARE) 5 MG tablet     ZYRTEC 10 MG OR TABS     No current facility-administered medications for this visit.       LABS:  Lab Results   Component Value Date     05/09/2023     09/28/2020      Lab Results   Component Value Date    POTASSIUM 4.4 05/09/2023    POTASSIUM 4.8 05/27/2022    POTASSIUM 4.0 09/28/2020     Lab Results   Component Value Date    CHLORIDE 104 05/09/2023    CHLORIDE 110 05/27/2022    CHLORIDE 106 09/28/2020     Lab Results   Component Value Date    EDUARDO 9.3 05/09/2023    EDUARDO 9.7 09/28/2020     Lab Results   Component Value Date    CO2 26 05/09/2023    CO2 30 05/27/2022    CO2 30 09/28/2020     Lab Results   Component Value Date    BUN 17.2 05/09/2023    BUN 12 05/27/2022    BUN 11 09/28/2020     Lab Results   Component Value Date    CR 0.80 05/09/2023    CR 0.86 09/28/2020     Lab Results   Component Value Date     05/09/2023     05/27/2022    GLC 94 09/28/2020     Lab Results   Component Value Date     05/09/2023    LDL 76 03/17/2021     HDL Cholesterol   Date Value Ref Range Status   03/17/2021 66 >49  mg/dL Final     Direct Measure HDL   Date Value Ref Range Status   05/09/2023 59 >=50 mg/dL Final   ]  GFR Estimate   Date Value Ref Range Status   05/09/2023 79 >60 mL/min/1.73m2 Final     Comment:     eGFR calculated using 2021 CKD-EPI equation.   09/28/2020 70 >60 mL/min/[1.73_m2] Final     Comment:     Non  GFR Calc  Starting 12/18/2018, serum creatinine based estimated GFR (eGFR) will be   calculated using the Chronic Kidney Disease Epidemiology Collaboration   (CKD-EPI) equation.       Lab Results   Component Value Date    CR 0.80 05/09/2023    CR 0.86 09/28/2020     No results found for: MICROALBUMIN    ANTHROPOMETRICS:  Vitals: [facility] 01/19/2005   There is no height or weight on file to calculate BMI.      Wt Readings from Last 5 Encounters:   05/02/23 142 kg (313 lb)   07/05/22 143.3 kg (316 lb)   05/27/22 142 kg (313 lb)   11/24/21 144 kg (317 lb 8 oz)   10/21/21 145.3 kg (320 lb 6.4 oz)       Weight Change: no significant weight change x 2 years     ESTIMATED KCAL REQUIREMENTS:  4416-0855 kcal per day, based on REE for weight loss     NUTRITION DIAGNOSIS: Food- and nutrition-related knowledge deficit related to no previous diet education as evidenced by patient report    NUTRITION INTERVENTION:  Education given to support: general nutrition guidelines, weight reduction, consistent meals, exercise and portion control  Education Materials Provided: My Plate Planner/Choose My Plate, Weight Loss Tips and Prediabetes info - will send next week from clinic  Motivational Interviewing    PATIENT'S BEHAVIOR CHANGE GOALS:   Continue with Noom   Work on consistent weight loss over time, 1-2 lbs/week on average    MONITOR / EVALUATE:  RD will monitor/evaluate:  Blood Glucose / A1c  Weight change    FOLLOW-UP:  Follow up with RD as needed.    Elvie Pagan RD, LD, [facility]   Time spent in minutes: 29  Encounter: Individual

## 2023-07-05 ENCOUNTER — ANCILLARY PROCEDURE (OUTPATIENT)
Dept: CARDIOLOGY | Age: 68
End: 2023-07-05
Attending: INTERNAL MEDICINE

## 2023-07-05 DIAGNOSIS — I25.10 CORONARY ARTERY DISEASE INVOLVING NATIVE CORONARY ARTERY OF NATIVE HEART WITHOUT ANGINA PECTORIS: ICD-10-CM

## 2023-07-05 DIAGNOSIS — I10 ESSENTIAL HYPERTENSION: ICD-10-CM

## 2023-07-05 PROCEDURE — 93880 EXTRACRANIAL BILAT STUDY: CPT | Performed by: INTERNAL MEDICINE

## 2023-07-06 ENCOUNTER — TELEPHONE (OUTPATIENT)
Dept: PODIATRY | Age: 68
End: 2023-07-06

## 2023-07-07 ENCOUNTER — WALK IN (OUTPATIENT)
Dept: URGENT CARE | Age: 68
End: 2023-07-07

## 2023-07-07 VITALS
SYSTOLIC BLOOD PRESSURE: 154 MMHG | DIASTOLIC BLOOD PRESSURE: 90 MMHG | HEART RATE: 86 BPM | RESPIRATION RATE: 18 BRPM | OXYGEN SATURATION: 97 % | TEMPERATURE: 98 F

## 2023-07-07 DIAGNOSIS — S61.239A PUNCTURE WOUND OF FINGER, INITIAL ENCOUNTER: Primary | ICD-10-CM

## 2023-07-07 PROCEDURE — 99214 OFFICE O/P EST MOD 30 MIN: CPT | Performed by: FAMILY MEDICINE

## 2023-07-07 RX ORDER — CEPHALEXIN 500 MG/1
500 CAPSULE ORAL 3 TIMES DAILY
Qty: 21 CAPSULE | Refills: 0 | Status: SHIPPED | OUTPATIENT
Start: 2023-07-07 | End: 2023-07-14

## 2023-07-14 DIAGNOSIS — J45.30 MILD PERSISTENT ALLERGIC ASTHMA: ICD-10-CM

## 2023-07-14 DIAGNOSIS — J31.0 MIXED RHINITIS: ICD-10-CM

## 2023-07-18 ENCOUNTER — E-ADVICE (OUTPATIENT)
Dept: ALLERGY | Age: 68
End: 2023-07-18

## 2023-07-18 DIAGNOSIS — J31.0 MIXED RHINITIS: ICD-10-CM

## 2023-07-18 DIAGNOSIS — J45.30 MILD PERSISTENT ALLERGIC ASTHMA: ICD-10-CM

## 2023-07-18 RX ORDER — MONTELUKAST SODIUM 10 MG/1
10 TABLET ORAL NIGHTLY
Qty: 90 TABLET | Refills: 0 | OUTPATIENT
Start: 2023-07-18

## 2023-07-18 RX ORDER — MONTELUKAST SODIUM 10 MG/1
10 TABLET ORAL NIGHTLY
Qty: 90 TABLET | Refills: 3 | Status: SHIPPED | OUTPATIENT
Start: 2023-07-18

## 2023-07-20 ASSESSMENT — PATIENT HEALTH QUESTIONNAIRE - PHQ9
CLINICAL INTERPRETATION OF PHQ2 SCORE: 0
2. FEELING DOWN, DEPRESSED OR HOPELESS: NOT AT ALL
1. LITTLE INTEREST OR PLEASURE IN DOING THINGS: NOT AT ALL
SUM OF ALL RESPONSES TO PHQ9 QUESTIONS 1 AND 2: 0
CLINICAL INTERPRETATION OF PHQ2 SCORE: NO FURTHER SCREENING NEEDED

## 2023-07-24 ENCOUNTER — MYC MEDICAL ADVICE (OUTPATIENT)
Dept: FAMILY MEDICINE | Facility: CLINIC | Age: 68
End: 2023-07-24

## 2023-07-25 ENCOUNTER — LAB (OUTPATIENT)
Dept: LAB | Facility: CLINIC | Age: 68
End: 2023-07-25
Payer: COMMERCIAL

## 2023-07-25 DIAGNOSIS — E03.8 SUBCLINICAL HYPOTHYROIDISM: ICD-10-CM

## 2023-07-25 LAB — TSH SERPL DL<=0.005 MIU/L-ACNC: 2.41 UIU/ML (ref 0.3–4.2)

## 2023-07-25 PROCEDURE — 36415 COLL VENOUS BLD VENIPUNCTURE: CPT

## 2023-07-25 PROCEDURE — 84443 ASSAY THYROID STIM HORMONE: CPT

## 2023-07-25 NOTE — TELEPHONE ENCOUNTER
Per virtual visit with PCP on 5/5/23: For her incontinence, I suspect it was more the oxybutynin that caused the dryness more than the flomax or vesicare. will try her back on Vesicare. See orders. If no help, might consider myrbetriq or refer back to urology. Discussed other possible side effects including dizzyness, fall risk, urine retention.

## 2023-07-26 ASSESSMENT — ENCOUNTER SYMPTOMS
DIARRHEA: 0
ABDOMINAL PAIN: 0
HEADACHES: 0
FACIAL SWELLING: 0
ADENOPATHY: 0
SLEEP DISTURBANCE: 0
NERVOUS/ANXIOUS: 0
FEVER: 0
SORE THROAT: 0
WHEEZING: 0
CHEST TIGHTNESS: 0
APPETITE CHANGE: 0
VOMITING: 0

## 2023-07-27 ENCOUNTER — E-ADVICE (OUTPATIENT)
Dept: PODIATRY | Age: 68
End: 2023-07-27

## 2023-07-27 ENCOUNTER — OFFICE VISIT (OUTPATIENT)
Dept: FAMILY MEDICINE | Age: 68
End: 2023-07-27

## 2023-07-27 VITALS
DIASTOLIC BLOOD PRESSURE: 82 MMHG | WEIGHT: 208 LBS | SYSTOLIC BLOOD PRESSURE: 134 MMHG | TEMPERATURE: 97 F | RESPIRATION RATE: 18 BRPM | HEART RATE: 72 BPM | BODY MASS INDEX: 33.43 KG/M2 | HEIGHT: 66 IN

## 2023-07-27 DIAGNOSIS — Z00.00 MEDICARE ANNUAL WELLNESS VISIT, SUBSEQUENT: Primary | ICD-10-CM

## 2023-07-27 DIAGNOSIS — F41.1 GAD (GENERALIZED ANXIETY DISORDER): ICD-10-CM

## 2023-07-27 DIAGNOSIS — S60.459A SPLINTER OF FINGER: ICD-10-CM

## 2023-07-27 PROCEDURE — G0439 PPPS, SUBSEQ VISIT: HCPCS | Performed by: FAMILY MEDICINE

## 2023-07-27 PROCEDURE — 99214 OFFICE O/P EST MOD 30 MIN: CPT | Performed by: FAMILY MEDICINE

## 2023-07-27 RX ORDER — BUSPIRONE HYDROCHLORIDE 7.5 MG/1
7.5 TABLET ORAL DAILY
Qty: 90 TABLET | Refills: 1 | Status: SHIPPED | OUTPATIENT
Start: 2023-07-27

## 2023-07-27 ASSESSMENT — MINI COG
PATIENT WAS GIVEN REPEAT BACK WORDS FROM VERSION: 2 - LEADER SEASON TABLE
TOTAL SCORE: 5
PATIENT ABLE TO FILL IN THE CLOCK FACE WITH 10 MINUTES PAST 11 O'CLOCK?: YES, CLOCK IS CORRECT
PATIENT ABLE TO REPEAT THE 3 WORDS GIVEN PREVIOUSLY?: WAS ABLE TO REPEAT BACK 3 WORDS CORRECTLY

## 2023-07-27 ASSESSMENT — COGNITIVE AND FUNCTIONAL STATUS - GENERAL
ARE YOU BLIND OR DO YOU HAVE SERIOUS DIFFICULTY SEEING, EVEN WHEN WEARING GLASSES: NO
ARE YOU DEAF OR DO YOU HAVE SERIOUS DIFFICULTY  HEARING: NO
BECAUSE OF A PHYSICAL, MENTAL, OR EMOTIONAL CONDITION, DO YOU HAVE DIFFICULTY DOING ERRANDS ALONE: NO
DO YOU HAVE DIFFICULTY DRESSING OR BATHING: NO
DO YOU HAVE SERIOUS DIFFICULTY WALKING OR CLIMBING STAIRS: NO
BECAUSE OF A PHYSICAL, MENTAL, OR EMOTIONAL CONDITION, DO YOU HAVE SERIOUS DIFFICULTY CONCENTRATING, REMEMBERING OR MAKING DECISIONS: NO

## 2023-07-27 ASSESSMENT — ACTIVITIES OF DAILY LIVING (ADL)
ADL_BEFORE_ADMISSION: INDEPENDENT
NEEDS_ASSIST: NO
SENSORY_SUPPORT_DEVICES: EYEGLASSES
ADL_SHORT_OF_BREATH: NO
ADL_SCORE: 12
RECENT_DECLINE_ADL: NO

## 2023-07-27 ASSESSMENT — PATIENT HEALTH QUESTIONNAIRE - PHQ9
SUM OF ALL RESPONSES TO PHQ9 QUESTIONS 1 AND 2: 0
SUM OF ALL RESPONSES TO PHQ9 QUESTIONS 1 AND 2: 0
2. FEELING DOWN, DEPRESSED OR HOPELESS: NOT AT ALL
1. LITTLE INTEREST OR PLEASURE IN DOING THINGS: NOT AT ALL
CLINICAL INTERPRETATION OF PHQ2 SCORE: NO FURTHER SCREENING NEEDED

## 2023-07-28 ENCOUNTER — E-ADVICE (OUTPATIENT)
Dept: ALLERGY | Age: 68
End: 2023-07-28

## 2023-07-28 ENCOUNTER — OFFICE VISIT (OUTPATIENT)
Dept: ALLERGY | Age: 68
End: 2023-07-28

## 2023-07-28 VITALS
HEIGHT: 66 IN | HEART RATE: 67 BPM | BODY MASS INDEX: 33.43 KG/M2 | WEIGHT: 208 LBS | TEMPERATURE: 98.1 F | SYSTOLIC BLOOD PRESSURE: 128 MMHG | DIASTOLIC BLOOD PRESSURE: 70 MMHG

## 2023-07-28 DIAGNOSIS — J31.0 MIXED RHINITIS: ICD-10-CM

## 2023-07-28 DIAGNOSIS — J45.30 MILD PERSISTENT ALLERGIC ASTHMA: Primary | ICD-10-CM

## 2023-07-28 DIAGNOSIS — F41.9 ANXIETY: ICD-10-CM

## 2023-07-28 DIAGNOSIS — K21.9 GASTROESOPHAGEAL REFLUX DISEASE, UNSPECIFIED WHETHER ESOPHAGITIS PRESENT: ICD-10-CM

## 2023-07-28 PROCEDURE — 99213 OFFICE O/P EST LOW 20 MIN: CPT | Performed by: ALLERGY & IMMUNOLOGY

## 2023-07-28 RX ORDER — FLUTICASONE PROPIONATE AND SALMETEROL 250; 50 UG/1; UG/1
1 POWDER RESPIRATORY (INHALATION) DAILY
Qty: 1 EACH | Refills: 5 | Status: CANCELLED | OUTPATIENT
Start: 2023-07-28

## 2023-07-31 ENCOUNTER — TELEPHONE (OUTPATIENT)
Dept: ALLERGY | Age: 68
End: 2023-07-31

## 2023-07-31 RX ORDER — FLUTICASONE PROPIONATE AND SALMETEROL 250; 50 UG/1; UG/1
1 POWDER RESPIRATORY (INHALATION) DAILY
COMMUNITY

## 2023-08-03 ENCOUNTER — APPOINTMENT (OUTPATIENT)
Dept: CARDIOLOGY | Age: 68
End: 2023-08-03

## 2023-08-11 ENCOUNTER — OFFICE VISIT (OUTPATIENT)
Dept: GASTROENTEROLOGY | Age: 68
End: 2023-08-11

## 2023-08-11 ENCOUNTER — TELEPHONE (OUTPATIENT)
Dept: GASTROENTEROLOGY | Age: 68
End: 2023-08-11

## 2023-08-11 VITALS — BODY MASS INDEX: 33.54 KG/M2 | HEIGHT: 66 IN | WEIGHT: 208.67 LBS

## 2023-08-11 DIAGNOSIS — Z86.010 PERSONAL HISTORY OF COLONIC POLYPS: ICD-10-CM

## 2023-08-11 DIAGNOSIS — K21.00 GASTROESOPHAGEAL REFLUX DISEASE WITH ESOPHAGITIS WITHOUT HEMORRHAGE: Primary | ICD-10-CM

## 2023-08-11 PROCEDURE — 99214 OFFICE O/P EST MOD 30 MIN: CPT | Performed by: INTERNAL MEDICINE

## 2023-08-19 ENCOUNTER — HEALTH MAINTENANCE LETTER (OUTPATIENT)
Age: 68
End: 2023-08-19

## 2023-08-24 DIAGNOSIS — E03.8 SUBCLINICAL HYPOTHYROIDISM: ICD-10-CM

## 2023-08-24 RX ORDER — LEVOTHYROXINE SODIUM 88 UG/1
88 TABLET ORAL DAILY
Qty: 90 TABLET | Refills: 2 | Status: SHIPPED | OUTPATIENT
Start: 2023-08-24 | End: 2024-08-12

## 2023-09-13 ENCOUNTER — OFFICE VISIT (OUTPATIENT)
Dept: PODIATRY | Age: 68
End: 2023-09-13

## 2023-09-13 DIAGNOSIS — M21.6X1 ACQUIRED EQUINUS DEFORMITY OF BOTH FEET: Primary | ICD-10-CM

## 2023-09-13 DIAGNOSIS — M20.42 HAMMER TOES OF BOTH FEET: ICD-10-CM

## 2023-09-13 DIAGNOSIS — M19.079 OSTEOARTHRITIS OF ANKLE AND FOOT, UNSPECIFIED LATERALITY: ICD-10-CM

## 2023-09-13 DIAGNOSIS — L60.0 INGROWN NAIL: ICD-10-CM

## 2023-09-13 DIAGNOSIS — M21.6X2 ACQUIRED EQUINUS DEFORMITY OF BOTH FEET: Primary | ICD-10-CM

## 2023-09-13 DIAGNOSIS — M20.41 HAMMER TOES OF BOTH FEET: ICD-10-CM

## 2023-09-13 PROCEDURE — 99213 OFFICE O/P EST LOW 20 MIN: CPT | Performed by: PODIATRIST

## 2023-09-19 NOTE — PROGRESS NOTES
Does Ashli have a CPAP/Bipap?  Yes     Type of mask: nasal     [facility]: [facility] [phone]    [email or web address]     Edmonson Sleep Scale:  6  BMI:41.36  Stop Ban  Neck: 37 cm     Outpatient Sleep Medicine Consultation:      Name: Ashli Rogers MRN# [id number]   Age: 68 year old YOB: 1955     Date of Consultation: 2023  Consultation is requested by: Zelda Garcia MD  [address] [place] [address] Zelda Quijano*  Primary care provider: Zelda Garcia       Reason for Sleep Consult:     Ashli Rogers is sent by Zelda Quijano* for a sleep consultation regarding her severe obstructive sleep apnea.  Patient was previously diagnosed with severe obstructive sleep apnea AHI 47.5.  She has been on CPAP since.  Has some questions regarding alternative options for CPAP even though is able to tolerate CPAP very well.  Excessive daytime sleepiness has been under excellent control..    Patient s Reason for visit  Ashli Rogers main reason for visit: Check in, examine the condition of my device CPAP, and talk about Inspire  Patient states problem(s) started: check my chart to see when Dr Wilder diagnosed me.  Ashli Rogers's goals for this visit: to determine if I'm a candidate for Inspire  Today we have a long discussion regarding inspire as a treatment options.  Patient is explained the nature of the device and surgery involved.  After explanation patient is interested in just simply continue with her CPAP device and not having anything very interventional done.  She is going to work on weight loss and is seriously committed to it.  She wants to lose close to 100 pounds.  Once that occurs we discussed with the patient getting potentially home study to see if his sleep apnea significantly decreased.  Also we discussed with the patient that she experiences from her nasal mask that bothers her  .  She will have an appoint with our DME specialist to discuss the leak and potentially trial different mask.         Assessment and Plan:     Summary Sleep Diagnoses:  Obstructive sleep apnea    Comorbid Diagnoses:  Morbid obesity, prediabetes, hyperlipidemia      Summary Recommendations:  Continue CPAP use and management of obstructive sleep apnea  No orders of the defined types were placed in this encounter.        Summary Counseling:    Sleep Testing Reviewed  Obstructive Sleep Apnea Reviewed  Complications of Untreated Sleep Apnea Reviewed      Medical Decision-making:   Educational materials provided in instructions    Total time spent reviewing medical records, history and physical examination, review of previous testing and interpretation as well as documentation on this date: 30 minutes    CC: Zelda Quijano*          History of Present Illness:     Past Sleep Evaluations:    SLEEP-WAKE SCHEDULE:     Work/School Days: Patient goes to school/work: No   Usually gets into bed at between 9 an midnight  Takes patient about minutes, sometimes seconds to fall asleep  Has trouble falling asleep never nights per week  Wakes up in the middle of the night my bladder wake me up, 1 to 4 times a night times.  Wakes up due to    She has trouble falling back asleep no, I can usually go back to sleep easily times a week.   It usually takes minutes, or seconds to get back to sleep  Patient is usually up at 6 am to 9am  I'm retired  Uses alarm: Yes    Weekends/Non-work Days/All Other Days:  Usually gets into bed at [place].... 9 to midnight   Takes patient about minutes or seconds to fall asleep  Patient is usually up at 6 to 9  Uses alarm: Yes    Sleep Need  Patient gets  8 hours sleep on average   Patient thinks she needs about 8 hours sleep    Ashli GALAVIZ Rogers prefers to sleep in this position(s): Back   Patient states they do the following activities in bed: Use phone, computer, or tablet    Naps  Patient takes  a purposeful nap 2/3 times a week.... I love naps times a week and naps are usually half hour to an hour in duration  She feels better after a nap: Yes  She dozes off unintentionally not any more, pre CPAP often days per week  Patient has had a driving accident or near-miss due to sleepiness/drowsiness: No      SLEEP DISRUPTIONS:    Breathing/Snoring  Patient snores:Yes  Other people complain about her snoring: Yes  Patient has been told she stops breathing in her sleep:Yes  She has issues with the following: Morning mouth dryness;Getting up to urinate more than once    Movement:  Patient gets pain, discomfort, with an urge to move:  No  It happens when she is resting:  No  It happens more at night:  No  Patient has been told she kicks her legs at night:  No     Behaviors in Sleep:  Ashli Rogers has experienced the following behaviors while sleeping: Teeth grinding  She has experienced sudden muscle weakness during the day: No      Is there anything else you would like your sleep provider to know: I'm anxious to see Dr. Wilder, he's one of my favorite doctors.        CAFFEINE AND OTHER SUBSTANCES:    Patient consumes caffeinated beverages per day:  occasionally tea maybe weekly, soda once a month  Last caffeine use is usually: I've given up coffee  List of any prescribed or over the counter stimulants that patient takes: none  List of any prescribed or over the counter sleep medication patient takes:    List of previous sleep medications that patient has tried:    Patient drinks alcohol to help them sleep: No  Patient drinks alcohol near bedtime: No    Family History:  Patient has a family member been diagnosed with a sleep disorder: No            SCALES:    EPWORTH SLEEPINESS SCALE         9/20/2023     4:17 PM    Seeley Sleepiness Scale ( ELIZABETH Nina  [id number]<br>ESS - [place]/English - Final version - 21 Nov 07 - [facility] [place].)   Sitting and reading Slight chance of dozing   Watching TV Slight chance  of dozing   Sitting, inactive in a public place (e.g. a theatre or a meeting) Would never doze   As a passenger in a car for an hour without a break Slight chance of dozing   Lying down to rest in the afternoon when circumstances permit High chance of dozing   Sitting and talking to someone Would never doze   Sitting quietly after a lunch without alcohol Would never doze   In a car, while stopped for a few minutes in traffic Would never doze   Dakota Score (MC) 6   Dakota Score (Sleep) 6         INSOMNIA SEVERITY INDEX (OH)          9/20/2023     3:55 PM   Insomnia Severity Index (OH)   Difficulty falling asleep 0   Difficulty staying asleep 2   Problems waking up too early 0   How SATISFIED/DISSATISFIED are you with your CURRENT sleep pattern? 2   How NOTICEABLE to others do you think your sleep problem is in terms of impairing the quality of your life? 1   How WORRIED/DISTRESSED are you about your current sleep problem? 0   To what extent do you consider your sleep problem to INTERFERE with your daily functioning (e.g. daytime fatigue, mood, ability to function at work/daily chores, concentration, memory, mood, etc.) CURRENTLY? 0   OH Total Score 5       Guidelines for Scoring/Interpretation:  Total score categories:  0-7 = No clinically significant insomnia   8-14 = Subthreshold insomnia   15-21 = Clinical insomnia (moderate severity)  22-28 = Clinical insomnia (severe)  Used via courtesy of www.Code42ealth.va.gov with permission from Reza Foss PhD., [facility]      STOP BANG         9/21/2023     2:05 PM   STOP BANG Questionnaire (  2008, the American Society of Anesthesiologists, Inc. [employer]   Neck Cir (cm) Clinic: 37 cm   B/P Clinic: 110/72   BMI Clinic: 41.36         GAD7         No data to display                  CAGE-AID         No data to display                CAGE-AID reprinted with permission from the [place] Medical Journal, MICKI Lynne. and ANGELICA Levi,  "\"Conjoint screening questionnaires for alcohol and drug abuse\" [place] [employer] Journal 94: 135-140, 1995.      PATIENT HEALTH QUESTIONNAIRE-9 (PHQ - 9)         No data to display                Developed by Gisele Dodge, Elisabeth Fontana, Shad Hamilton and colleagues, with an educational isaias from [employer] No permission required to reproduce, translate, display or distribute.        Allergies:    Allergies   Allergen Reactions    Cephalexin Monohydrate      keflex    Gluten Meal GI Disturbance    Warfarin Sodium      coumadin       Medications:    Current Outpatient Medications   Medication Sig Dispense Refill    CALCIUM-MAGNESIUM-ZINC PO Take 1 tablet by mouth daily Reported on 3/28/2017      Cholecalciferol (VITAMIN D3 PO) Take by mouth daily      IBUPROFEN PO       indomethacin (INDOCIN) 50 MG capsule Take 1 capsule (50 mg) by mouth 2 times daily (with meals) 15 capsule 0    levothyroxine (SYNTHROID/LEVOTHROID) 75 MCG tablet TAKE ONE TABLET BY MOUTH EVERY MORNING 90 tablet 0    levothyroxine (SYNTHROID/LEVOTHROID) 88 MCG tablet TAKE ONE TABLET BY MOUTH ONCE DAILY 90 tablet 2    Omega-3 Fatty Acids (FISH OIL PO)       omeprazole (PRILOSEC) 20 MG DR capsule Take 1 capsule (20 mg) by mouth daily 90 capsule 1    simvastatin (ZOCOR) 10 MG tablet TAKE ONE TABLET BY MOUTH AT BEDTIME 90 tablet 3    solifenacin (VESICARE) 5 MG tablet Take 1 tablet (5 mg) by mouth daily 30 tablet 3    ZYRTEC 10 MG OR TABS ONE TABLET DAILY 30 8    omeprazole (PRILOSEC OTC) 20 MG EC tablet Take 1 tablet (20 mg) by mouth daily (Patient not taking: Reported on 5/2/2023) 90 tablet 1       Problem List:  Patient Active Problem List    Diagnosis Date Noted    Prediabetes 05/11/2023     Priority: Medium    ZELDA (obstructive sleep apnea) 08/01/2020     Priority: Medium    Hyperlipidemia LDL goal <130 08/01/2020     Priority: Medium    Bunion, left 08/01/2019     Priority: Medium    Pain due to varicose veins of both lower " extremities 08/01/2019     Priority: Medium    Diverticulitis of colon 02/03/2017     Priority: Medium    Morbid obesity (H) 07/05/2016     Priority: Medium    Hiatal hernia 07/05/2016     Priority: Medium    Gastroesophageal reflux disease, esophagitis presence not specified 03/18/2016     Priority: Medium     IMO Regulatory Load OCT 2020      Arthritis of knee, left 08/13/2013     Priority: Medium    ADD (attention deficit disorder) 03/03/2013     Priority: Medium     On vivanse and is being followed by a mental health specialist       Advanced directives, counseling/discussion 10/28/2011     Priority: Medium     Pt is going to take home the packet and is going to look at it.  MP/MA      DJD (degenerative joint disease) of knee 05/24/2011     Priority: Medium    Allergic rhinitis 11/13/2006     Priority: Medium     Problem list name updated by automated process. Provider to review      Varicose veins of lower extremities with complications 09/28/2005     Priority: Medium     Problem list name updated by automated process. Provider to review      S/P total hip arthroplasty 04/04/2003     Priority: Medium        Past Medical/Surgical History:  Past Medical History:   Diagnosis Date    Cataracts, bilateral     Closed fracture of medial malleolus     Distal avulsion fracture of the medial malleoli    Diverticulitis of colon 2/3/2017    DJD (degenerative joint disease) 05/24/2011    right knee, s/p injections    Erythema nodosum     Generalized osteoarthrosis, unspecified site     Hips    S/P total hip arthroplasty 04/04/2003    right    S/P total knee arthroplasty     left    Sleep apnea     on CPAP     Past Surgical History:   Procedure Laterality Date    COLONOSCOPY N/A 05/11/2016    normal, repeat 10 years    ESOPHAGOSCOPY, GASTROSCOPY, DUODENOSCOPY (EGD), COMBINED N/A 05/11/2016    Procedure: COMBINED ESOPHAGOSCOPY, GASTROSCOPY, DUODENOSCOPY (EGD), BIOPSY SINGLE OR MULTIPLE;  Surgeon: Ervin Johnston MD;   Location: [facility]    FINGER SURGERY Right 04/2019    ganglion cyst removed    PHACOEMULSIFICATION WITH STANDARD INTRAOCULAR LENS IMPLANT Left 10/7/2021    Procedure: PHACOEMULSIFICATION, CATARACT, WITH MULTIFOCAL INTRAOCULAR LENS IMPLANT INSERTION, left;  Surgeon: Jordan Collins MD;  Location: [facility]    PHACOEMULSIFICATION WITH STANDARD INTRAOCULAR LENS IMPLANT Right 10/21/2021    Procedure: PHACOEMULSIFICATION, CATARACT, WITH MULTIFOCAL INTRAOCULAR LENS IMPLANT INSERTION,right;  Surgeon: Praveen Guy MD;  Location: [facility]    ZZC TOTAL HIP ARTHROPLASTY Right 04/26/2004    Hip Replacement, Total    ZZC TOTAL KNEE ARTHROPLASTY Left 2013    ZZHC COLONOSCOPY W/WO BRUSH/WASH  01/11/2006       Social History:  Social History     Socioeconomic History    Marital status:      Spouse name: Osbaldo    Number of children: 2    Years of education: Not on file    Highest education level: Not on file   Occupational History    Occupation: retired     Comment: speech pathologist   Tobacco Use    Smoking status: Some Days     Types: Cigarettes, Other    Smokeless tobacco: Never    Tobacco comments:     Marijuana    Vaping Use    Vaping Use: Never used   Substance and Sexual Activity    Alcohol use: Yes     Comment: 2 drinks per month    Drug use: No    Sexual activity: Yes     Partners: Male   Other Topics Concern     Service Not Asked    Blood Transfusions Not Asked    Caffeine Concern Not Asked    Occupational Exposure Not Asked    Hobby Hazards Not Asked    Sleep Concern Not Asked    Stress Concern Not Asked    Weight Concern Yes     Comment: going to lose 100 pounds    Special Diet Not Asked    Back Care Not Asked    Exercise Not Asked    Bike Helmet Not Asked    Seat Belt Not Asked    Self-Exams Not Asked    Parent/sibling w/ CABG, MI or angioplasty before 65F 55M? Not Asked   Social History Narrative    Lives at home with , 2 horses. 2 grown children. Son lives in [facility] in [place]      Social Determinants of Health     Financial Resource Strain: Not on file   Food Insecurity: Not on file   Transportation Needs: Not on file   Physical Activity: Not on file   Stress: Not on file   Social Connections: Not on file   Interpersonal Safety: Not on file   Housing Stability: Not on file       Family History:  Family History   Problem Relation Age of Onset    Cancer Father          of lung cancer    Heart Disease Daughter         2 holes in her heart       Review of Systems:  A complete review of systems reviewed by me is negative with the exeption of what has been mentioned in the history of present illness.  In the last TWO WEEKS have you experienced any of the following symptoms?  Fevers: No  Night Sweats: No  Weight Gain: No  Pain at Night: No  Double Vision: No  Changes in Vision: No  Difficulty Breathing through Nose: No  Sore Throat in Morning: No  Dry Mouth in the Morning: Yes  Shortness of Breath Lying Flat: No  Shortness of Breath With Activity: Yes  Awakening with Shortness of Breath: No  Increased Cough: No  Heart Racing at Night: No  Swelling in Feet or Legs: No  Diarrhea at Night: No  Heartburn at Night: No  Urinating More than Once at Night: Yes  Losing Control of Urine at Night: No  Joint Pains at Night: No  Headaches in Morning: No  Weakness in Arms or Legs: No  Depressed Mood: No  Anxiety: No     Physical Examination:  Vitals: /72   Pulse 71   Ht 1.829 m (6')   Wt 138.3 kg (305 lb)   LMP 2005   SpO2 96%   BMI 41.37 kg/m    BMI= Body mass index is 41.37 kg/m .    Neck Cir (cm): 37 cm      GENERAL APPEARANCE: healthy, alert, no distress, cooperative, and over weight  EYES: Eyes grossly normal to inspection, PERRL, and conjunctivae and sclerae normal  NEURO: Normal strength and tone, mentation intact, and speech normal  PSYCH: mentation appears normal and affect normal/bright  Nasal exam-straight septum normal turbinates open nasal vestibules minimal exudates  clear..         Data: All pertinent previous laboratory data reviewed     Recent Labs   Lab Test 05/09/23  0839 05/27/22  0833    142   POTASSIUM 4.4 4.8   CHLORIDE 104 110*   CO2 26 30   ANIONGAP 12 2*   * 110*   BUN 17.2 12   CR 0.80 0.72   EDUARDO 9.3 8.9       Recent Labs   Lab Test 11/24/21  1017   WBC 6.8   RBC 4.39   HGB 13.4   HCT 41.1   MCV 94   MCH 30.5   MCHC 32.6   RDW 13.7          Recent Labs   Lab Test 05/09/23  0839   PROTTOTAL 8.0   ALBUMIN 4.3   BILITOTAL 0.3   ALKPHOS 99   AST 23   ALT 24       TSH   Date Value   07/25/2023 2.41 uIU/mL   05/09/2023 4.57 uIU/mL (H)   05/27/2022 3.27 mU/L   11/24/2021 3.37 mU/L   03/17/2021 4.90 mU/L (H)   09/28/2020 5.19 mU/L (H)       No results found for: UAMP, UBARB, BENZODIAZEUR, UCANN, UCOC, OPIT, UPCP    No results found for: IRONSAT, PV48318, NATALYA    pH Arterial (no units)   Date Value   05/28/2002 6.5       @LABRCNTIPR(phv:4,pco2v:4,po2v:4,hco3v:4,de:4,o2per:4)@    Echocardiology: No results found for this or any previous visit (from the past 4320 hour(s)).    Chest x-ray: No results found for this or any previous visit from the past 365 days.      Chest CT: No results found for this or any previous visit from the past 365 days.      PFT: Most Recent [facility] Pulmonary Function Testing    No results found for: 20001  No results found for: 20002  No results found for: 20003  No results found for: 20015  No results found for: 20016  No results found for: 20027  No results found for: 20028  No results found for: 20029  No results found for: 20079  No results found for: 20080  No results found for: 20081  No results found for: 20335  No results found for: 20105  No results found for: 20053  No results found for: 20054  No results found for: 20055      Josy Barragan [facility] 9/21/2023

## 2023-09-20 RX ORDER — LOSARTAN POTASSIUM 100 MG/1
100 TABLET ORAL DAILY
Qty: 90 TABLET | Refills: 1 | Status: CANCELLED | OUTPATIENT
Start: 2023-09-20

## 2023-09-20 ASSESSMENT — SLEEP AND FATIGUE QUESTIONNAIRES
HOW LIKELY ARE YOU TO NOD OFF OR FALL ASLEEP WHILE SITTING INACTIVE IN A PUBLIC PLACE: WOULD NEVER DOZE
HOW LIKELY ARE YOU TO NOD OFF OR FALL ASLEEP WHILE LYING DOWN TO REST IN THE AFTERNOON WHEN CIRCUMSTANCES PERMIT: HIGH CHANCE OF DOZING
HOW LIKELY ARE YOU TO NOD OFF OR FALL ASLEEP IN A CAR, WHILE STOPPED FOR A FEW MINUTES IN TRAFFIC: WOULD NEVER DOZE
HOW LIKELY ARE YOU TO NOD OFF OR FALL ASLEEP WHILE WATCHING TV: SLIGHT CHANCE OF DOZING
HOW LIKELY ARE YOU TO NOD OFF OR FALL ASLEEP WHILE SITTING QUIETLY AFTER LUNCH WITHOUT ALCOHOL: WOULD NEVER DOZE
HOW LIKELY ARE YOU TO NOD OFF OR FALL ASLEEP WHEN YOU ARE A PASSENGER IN A CAR FOR AN HOUR WITHOUT A BREAK: SLIGHT CHANCE OF DOZING
HOW LIKELY ARE YOU TO NOD OFF OR FALL ASLEEP WHILE SITTING AND TALKING TO SOMEONE: WOULD NEVER DOZE
HOW LIKELY ARE YOU TO NOD OFF OR FALL ASLEEP WHILE SITTING AND READING: SLIGHT CHANCE OF DOZING

## 2023-09-21 ENCOUNTER — OFFICE VISIT (OUTPATIENT)
Dept: SLEEP MEDICINE | Facility: CLINIC | Age: 68
End: 2023-09-21
Attending: FAMILY MEDICINE
Payer: COMMERCIAL

## 2023-09-21 VITALS
DIASTOLIC BLOOD PRESSURE: 72 MMHG | OXYGEN SATURATION: 96 % | WEIGHT: 293 LBS | BODY MASS INDEX: 39.68 KG/M2 | HEART RATE: 71 BPM | SYSTOLIC BLOOD PRESSURE: 110 MMHG | HEIGHT: 72 IN

## 2023-09-21 DIAGNOSIS — G47.33 OSA (OBSTRUCTIVE SLEEP APNEA): ICD-10-CM

## 2023-09-21 PROCEDURE — 99203 OFFICE O/P NEW LOW 30 MIN: CPT | Performed by: OTOLARYNGOLOGY

## 2023-09-21 RX ORDER — FLUTICASONE PROPIONATE 50 MCG
2 SPRAY, SUSPENSION (ML) NASAL DAILY
Qty: 48 G | Refills: 1 | Status: SHIPPED | OUTPATIENT
Start: 2023-09-21

## 2023-09-22 ASSESSMENT — ENCOUNTER SYMPTOMS
COLOR CHANGE: 1
ACTIVITY CHANGE: 1

## 2023-09-25 ENCOUNTER — APPOINTMENT (OUTPATIENT)
Dept: MAMMOGRAPHY | Age: 68
End: 2023-09-25
Attending: FAMILY MEDICINE

## 2023-10-03 ENCOUNTER — E-ADVICE (OUTPATIENT)
Dept: GASTROENTEROLOGY | Age: 68
End: 2023-10-03

## 2023-10-03 RX ORDER — BISACODYL 5 MG/1
TABLET, DELAYED RELEASE ORAL
Qty: 2 TABLET | Refills: 0 | Status: SHIPPED | OUTPATIENT
Start: 2023-10-03 | End: 2023-11-17

## 2023-10-03 RX ORDER — SIMETHICONE 125 MG
TABLET,CHEWABLE ORAL
Qty: 2 TABLET | Refills: 0 | Status: SHIPPED | OUTPATIENT
Start: 2023-10-03 | End: 2023-11-17

## 2023-10-06 ENCOUNTER — IMAGING SERVICES (OUTPATIENT)
Dept: MAMMOGRAPHY | Age: 68
End: 2023-10-06
Attending: FAMILY MEDICINE

## 2023-10-06 DIAGNOSIS — Z12.31 VISIT FOR SCREENING MAMMOGRAM: ICD-10-CM

## 2023-10-06 PROCEDURE — 77063 BREAST TOMOSYNTHESIS BI: CPT | Performed by: RADIOLOGY

## 2023-10-06 PROCEDURE — 77067 SCR MAMMO BI INCL CAD: CPT | Performed by: RADIOLOGY

## 2023-10-09 DIAGNOSIS — N39.46 MIXED STRESS AND URGE URINARY INCONTINENCE: ICD-10-CM

## 2023-10-09 RX ORDER — SOLIFENACIN SUCCINATE 5 MG/1
5 TABLET, FILM COATED ORAL DAILY
Qty: 30 TABLET | Refills: 3 | Status: SHIPPED | OUTPATIENT
Start: 2023-10-09 | End: 2024-09-13

## 2023-10-11 DIAGNOSIS — E03.9 HYPOTHYROIDISM, UNSPECIFIED TYPE: ICD-10-CM

## 2023-10-11 DIAGNOSIS — E78.5 DYSLIPIDEMIA: ICD-10-CM

## 2023-10-11 RX ORDER — LEVOTHYROXINE SODIUM 137 UG/1
137 TABLET ORAL DAILY
Qty: 90 TABLET | Refills: 3 | Status: SHIPPED | OUTPATIENT
Start: 2023-10-11

## 2023-10-11 RX ORDER — ATORVASTATIN CALCIUM 40 MG/1
40 TABLET, FILM COATED ORAL DAILY
Qty: 90 TABLET | Refills: 3 | Status: SHIPPED | OUTPATIENT
Start: 2023-10-11

## 2023-11-08 DIAGNOSIS — F41.1 GAD (GENERALIZED ANXIETY DISORDER): ICD-10-CM

## 2023-11-08 RX ORDER — BUSPIRONE HYDROCHLORIDE 7.5 MG/1
7.5 TABLET ORAL DAILY
Qty: 90 TABLET | Refills: 0 | OUTPATIENT
Start: 2023-11-08

## 2023-12-05 RX ORDER — OMEPRAZOLE 40 MG/1
40 CAPSULE, DELAYED RELEASE ORAL 2 TIMES DAILY
Qty: 180 CAPSULE | Refills: 0 | Status: SHIPPED | OUTPATIENT
Start: 2023-12-05

## 2023-12-12 RX ORDER — CLOBETASOL PROPIONATE 0.5 MG/G
OINTMENT TOPICAL
Qty: 30 G | Refills: 0 | Status: SHIPPED | OUTPATIENT
Start: 2023-12-12

## 2023-12-15 RX ORDER — CLOBETASOL PROPIONATE 0.5 MG/G
OINTMENT TOPICAL
Qty: 30 G | Refills: 0 | OUTPATIENT
Start: 2023-12-15

## 2023-12-20 RX ORDER — LOSARTAN POTASSIUM 100 MG/1
100 TABLET ORAL DAILY
Qty: 90 TABLET | Refills: 1 | Status: SHIPPED | OUTPATIENT
Start: 2023-12-20

## 2024-01-03 ENCOUNTER — E-ADVICE (OUTPATIENT)
Dept: FAMILY MEDICINE | Age: 69
End: 2024-01-03

## 2024-01-22 ENCOUNTER — TELEPHONE (OUTPATIENT)
Dept: GASTROENTEROLOGY | Age: 69
End: 2024-01-22

## 2024-01-30 DIAGNOSIS — F41.1 GAD (GENERALIZED ANXIETY DISORDER): ICD-10-CM

## 2024-01-31 ENCOUNTER — ANESTHESIA EVENT (OUTPATIENT)
Dept: GASTROENTEROLOGY | Age: 69
End: 2024-01-31

## 2024-01-31 ENCOUNTER — TELEPHONE (OUTPATIENT)
Dept: GASTROENTEROLOGY | Age: 69
End: 2024-01-31

## 2024-02-01 ENCOUNTER — ANESTHESIA (OUTPATIENT)
Dept: GASTROENTEROLOGY | Age: 69
End: 2024-02-01

## 2024-02-01 ENCOUNTER — APPOINTMENT (OUTPATIENT)
Dept: GASTROENTEROLOGY | Age: 69
End: 2024-02-01
Attending: INTERNAL MEDICINE

## 2024-02-01 VITALS
DIASTOLIC BLOOD PRESSURE: 70 MMHG | BODY MASS INDEX: 32.95 KG/M2 | WEIGHT: 205 LBS | SYSTOLIC BLOOD PRESSURE: 110 MMHG | RESPIRATION RATE: 18 BRPM | HEIGHT: 66 IN | TEMPERATURE: 97 F | HEART RATE: 68 BPM | OXYGEN SATURATION: 95 %

## 2024-02-01 DIAGNOSIS — K29.70 GASTRITIS WITHOUT BLEEDING, UNSPECIFIED CHRONICITY, UNSPECIFIED GASTRITIS TYPE: ICD-10-CM

## 2024-02-01 DIAGNOSIS — K57.90 DIVERTICULOSIS: ICD-10-CM

## 2024-02-01 DIAGNOSIS — K21.00 GASTROESOPHAGEAL REFLUX DISEASE WITH ESOPHAGITIS WITHOUT HEMORRHAGE: ICD-10-CM

## 2024-02-01 DIAGNOSIS — K31.7 GASTRIC POLYPS: ICD-10-CM

## 2024-02-01 DIAGNOSIS — Z86.010 PERSONAL HISTORY OF COLONIC POLYPS: ICD-10-CM

## 2024-02-01 RX ORDER — DEXTROSE MONOHYDRATE 50 MG/ML
INJECTION, SOLUTION INTRAVENOUS CONTINUOUS PRN
Status: DISCONTINUED | OUTPATIENT
Start: 2024-02-01 | End: 2024-02-03 | Stop reason: HOSPADM

## 2024-02-01 RX ORDER — LIDOCAINE HYDROCHLORIDE 10 MG/ML
5-10 INJECTION, SOLUTION INFILTRATION; PERINEURAL PRN
Status: DISCONTINUED | OUTPATIENT
Start: 2024-02-01 | End: 2024-02-03 | Stop reason: HOSPADM

## 2024-02-01 RX ORDER — LIDOCAINE HYDROCHLORIDE 10 MG/ML
INJECTION, SOLUTION INFILTRATION; PERINEURAL PRN
Status: DISCONTINUED | OUTPATIENT
Start: 2024-02-01 | End: 2024-02-01

## 2024-02-01 RX ORDER — SODIUM CHLORIDE, SODIUM LACTATE, POTASSIUM CHLORIDE, CALCIUM CHLORIDE 600; 310; 30; 20 MG/100ML; MG/100ML; MG/100ML; MG/100ML
INJECTION, SOLUTION INTRAVENOUS CONTINUOUS
Status: DISCONTINUED | OUTPATIENT
Start: 2024-02-01 | End: 2024-02-03 | Stop reason: HOSPADM

## 2024-02-01 RX ORDER — PROPOFOL 10 MG/ML
INJECTION, EMULSION INTRAVENOUS PRN
Status: DISCONTINUED | OUTPATIENT
Start: 2024-02-01 | End: 2024-02-01

## 2024-02-01 RX ORDER — SODIUM CHLORIDE 9 MG/ML
INJECTION, SOLUTION INTRAVENOUS CONTINUOUS
Status: DISCONTINUED | OUTPATIENT
Start: 2024-02-01 | End: 2024-02-03 | Stop reason: HOSPADM

## 2024-02-01 RX ORDER — BUSPIRONE HYDROCHLORIDE 7.5 MG/1
7.5 TABLET ORAL DAILY
Qty: 90 TABLET | Refills: 0 | Status: SHIPPED | OUTPATIENT
Start: 2024-02-01

## 2024-02-01 RX ADMIN — SODIUM CHLORIDE, SODIUM LACTATE, POTASSIUM CHLORIDE, CALCIUM CHLORIDE: 600; 310; 30; 20 INJECTION, SOLUTION INTRAVENOUS at 13:48

## 2024-02-01 RX ADMIN — PROPOFOL 100 MG: 10 INJECTION, EMULSION INTRAVENOUS at 14:46

## 2024-02-01 RX ADMIN — PROPOFOL 100 MG: 10 INJECTION, EMULSION INTRAVENOUS at 14:36

## 2024-02-01 RX ADMIN — PROPOFOL 50 MG: 10 INJECTION, EMULSION INTRAVENOUS at 14:52

## 2024-02-01 RX ADMIN — LIDOCAINE HYDROCHLORIDE 50 MG: 10 INJECTION, SOLUTION INFILTRATION; PERINEURAL at 14:36

## 2024-02-01 RX ADMIN — PROPOFOL 100 MG: 10 INJECTION, EMULSION INTRAVENOUS at 14:49

## 2024-02-01 RX ADMIN — PROPOFOL 100 MG: 10 INJECTION, EMULSION INTRAVENOUS at 14:41

## 2024-02-01 ASSESSMENT — ENCOUNTER SYMPTOMS: EXERCISE TOLERANCE: GOOD (>4 METS)

## 2024-02-01 ASSESSMENT — PAIN SCALES - GENERAL
PAINLEVEL_OUTOF10: 0

## 2024-02-05 LAB
ASR DISCLAIMER: NORMAL
CASE RPRT: NORMAL
CLINICAL INFO: NORMAL
PATH REPORT.FINAL DX SPEC: NORMAL
PATH REPORT.GROSS SPEC: NORMAL

## 2024-02-06 DIAGNOSIS — I10 ESSENTIAL HYPERTENSION: ICD-10-CM

## 2024-02-06 DIAGNOSIS — E03.8 SUBCLINICAL HYPOTHYROIDISM: ICD-10-CM

## 2024-02-06 RX ORDER — HYDROCHLOROTHIAZIDE 25 MG/1
50 TABLET ORAL DAILY
Qty: 180 TABLET | Refills: 1 | Status: SHIPPED | OUTPATIENT
Start: 2024-02-06

## 2024-02-07 RX ORDER — LEVOTHYROXINE SODIUM 75 UG/1
75 TABLET ORAL DAILY
Qty: 30 TABLET | Refills: 0 | OUTPATIENT
Start: 2024-02-07

## 2024-02-13 RX ORDER — VERAPAMIL HYDROCHLORIDE 180 MG/1
180 CAPSULE, EXTENDED RELEASE ORAL NIGHTLY
Qty: 90 CAPSULE | Refills: 1 | Status: SHIPPED | OUTPATIENT
Start: 2024-02-13

## 2024-04-02 ASSESSMENT — ENCOUNTER SYMPTOMS
VOMITING: 0
FEVER: 0
APPETITE CHANGE: 0
SLEEP DISTURBANCE: 0
NERVOUS/ANXIOUS: 0
WHEEZING: 0
HEADACHES: 0
ABDOMINAL PAIN: 0
ADENOPATHY: 0
CHEST TIGHTNESS: 0
DIARRHEA: 0
FACIAL SWELLING: 0
SORE THROAT: 0

## 2024-04-03 ENCOUNTER — APPOINTMENT (OUTPATIENT)
Dept: ALLERGY | Age: 69
End: 2024-04-03

## 2024-04-03 VITALS
SYSTOLIC BLOOD PRESSURE: 154 MMHG | TEMPERATURE: 96.6 F | OXYGEN SATURATION: 98 % | HEART RATE: 68 BPM | DIASTOLIC BLOOD PRESSURE: 80 MMHG | WEIGHT: 203 LBS | BODY MASS INDEX: 32.62 KG/M2 | HEIGHT: 66 IN

## 2024-04-03 DIAGNOSIS — F41.9 ANXIETY: ICD-10-CM

## 2024-04-03 DIAGNOSIS — K21.9 GASTROESOPHAGEAL REFLUX DISEASE, UNSPECIFIED WHETHER ESOPHAGITIS PRESENT: ICD-10-CM

## 2024-04-03 DIAGNOSIS — H10.10 ALLERGIC RHINOCONJUNCTIVITIS: ICD-10-CM

## 2024-04-03 DIAGNOSIS — J45.30 MILD PERSISTENT ALLERGIC ASTHMA: Primary | ICD-10-CM

## 2024-04-03 DIAGNOSIS — J30.9 ALLERGIC RHINOCONJUNCTIVITIS: ICD-10-CM

## 2024-04-03 DIAGNOSIS — J31.0 MIXED RHINITIS: ICD-10-CM

## 2024-04-03 RX ORDER — ALBUTEROL SULFATE 90 UG/1
2 AEROSOL, METERED RESPIRATORY (INHALATION) EVERY 4 HOURS PRN
Qty: 1 EACH | Refills: 0 | Status: SHIPPED | OUTPATIENT
Start: 2024-04-03

## 2024-04-03 RX ORDER — MONTELUKAST SODIUM 10 MG/1
10 TABLET ORAL NIGHTLY
Qty: 90 TABLET | Refills: 3 | Status: SHIPPED | OUTPATIENT
Start: 2024-04-03

## 2024-04-11 ENCOUNTER — E-ADVICE (OUTPATIENT)
Dept: ALLERGY | Age: 69
End: 2024-04-11

## 2024-04-17 ENCOUNTER — APPOINTMENT (OUTPATIENT)
Dept: OPTOMETRY | Age: 69
End: 2024-04-17

## 2024-04-17 DIAGNOSIS — H40.003 OPEN ANGLE WITH BORDERLINE GLAUCOMA FINDINGS, BILATERAL: Primary | ICD-10-CM

## 2024-04-17 DIAGNOSIS — Z83.511 FAMILY HISTORY OF GLAUCOMA: ICD-10-CM

## 2024-04-17 DIAGNOSIS — H52.4 PRESBYOPIA: ICD-10-CM

## 2024-04-17 DIAGNOSIS — H52.13 MYOPIA OF BOTH EYES: ICD-10-CM

## 2024-04-17 DIAGNOSIS — H25.13 AGE-RELATED NUCLEAR CATARACT OF BOTH EYES: ICD-10-CM

## 2024-04-17 DIAGNOSIS — H52.223 REGULAR ASTIGMATISM OF BOTH EYES: ICD-10-CM

## 2024-04-17 ASSESSMENT — EXTERNAL EXAM - LEFT EYE: OS_EXAM: NORMAL

## 2024-04-17 ASSESSMENT — TONOMETRY
IOP_METHOD: APPLANATION
OD_IOP_MMHG: 18
OS_IOP_MMHG: 18

## 2024-04-17 ASSESSMENT — REFRACTION_MANIFEST
OD_SPHERE: -2.75
OD_CYLINDER: +1.00
OS_ADD: +2.25
OS_SPHERE: -3.00
OS_CYLINDER: +1.00
OD_AXIS: 150
OS_AXIS: 075
OD_ADD: +2.25

## 2024-04-17 ASSESSMENT — REFRACTION_WEARINGRX
OS_ADD: +2.25
OD_SPHERE: -3.00
OD_AXIS: 150
OS_AXIS: 075
OD_ADD: +2.25
OS_CYLINDER: +1.25
OS_SPHERE: -3.25
OD_CYLINDER: +1.25

## 2024-04-17 ASSESSMENT — CONF VISUAL FIELD
OS_INFERIOR_NASAL_RESTRICTION: 0
OD_SUPERIOR_TEMPORAL_RESTRICTION: 0
OS_SUPERIOR_TEMPORAL_RESTRICTION: 0
OS_INFERIOR_TEMPORAL_RESTRICTION: 0
OS_NORMAL: 1
OD_SUPERIOR_NASAL_RESTRICTION: 0
OS_SUPERIOR_NASAL_RESTRICTION: 0
OD_INFERIOR_NASAL_RESTRICTION: 0
OD_INFERIOR_TEMPORAL_RESTRICTION: 0
OD_NORMAL: 1

## 2024-04-17 ASSESSMENT — VISUAL ACUITY
OD_CC: 20/20
OD_CC: JAEGER 1
OD_CC+: -2
CORRECTION_TYPE: GLASSES
OS_CC: 20/20
METHOD: SNELLEN - LINEAR

## 2024-04-17 ASSESSMENT — PACHYMETRY
OD_CT(UM): 564
OS_CT(UM): 561

## 2024-04-17 ASSESSMENT — SLIT LAMP EXAM - LIDS
COMMENTS: NORMAL
COMMENTS: NORMAL

## 2024-04-17 ASSESSMENT — KERATOMETRY
OD_AXISANGLE2_DEGREES: 180
OS_K2POWER_DIOPTERS: 42.25
OD_K2POWER_DIOPTERS: 42.25
OS_K1POWER_DIOPTERS: 42.00
OD_K1POWER_DIOPTERS: 42.00
OD_AXISANGLE_DEGREES: 090
OS_AXISANGLE_DEGREES: 090
OS_AXISANGLE2_DEGREES: 180

## 2024-04-17 ASSESSMENT — CUP TO DISC RATIO
OS_RATIO: 0.5
OD_RATIO: 0.5

## 2024-04-17 ASSESSMENT — EXTERNAL EXAM - RIGHT EYE: OD_EXAM: NORMAL

## 2024-04-23 ENCOUNTER — APPOINTMENT (OUTPATIENT)
Dept: CARDIOLOGY | Age: 69
End: 2024-04-23

## 2024-04-23 VITALS
OXYGEN SATURATION: 96 % | BODY MASS INDEX: 33.75 KG/M2 | HEIGHT: 66 IN | DIASTOLIC BLOOD PRESSURE: 72 MMHG | RESPIRATION RATE: 16 BRPM | WEIGHT: 210 LBS | HEART RATE: 75 BPM | SYSTOLIC BLOOD PRESSURE: 138 MMHG

## 2024-04-23 DIAGNOSIS — I10 ESSENTIAL HYPERTENSION: Primary | ICD-10-CM

## 2024-04-23 DIAGNOSIS — E66.9 OBESITY (BMI 30-39.9): ICD-10-CM

## 2024-04-23 DIAGNOSIS — I25.10 CORONARY ARTERY DISEASE INVOLVING NATIVE CORONARY ARTERY OF NATIVE HEART WITHOUT ANGINA PECTORIS: ICD-10-CM

## 2024-04-23 ASSESSMENT — PAIN SCALES - GENERAL: PAINLEVEL: 6

## 2024-04-24 ENCOUNTER — LAB SERVICES (OUTPATIENT)
Dept: LAB | Age: 69
End: 2024-04-24

## 2024-04-24 DIAGNOSIS — I25.10 CORONARY ARTERY DISEASE INVOLVING NATIVE CORONARY ARTERY OF NATIVE HEART WITHOUT ANGINA PECTORIS: ICD-10-CM

## 2024-04-24 DIAGNOSIS — I10 ESSENTIAL HYPERTENSION: ICD-10-CM

## 2024-04-24 DIAGNOSIS — E66.9 OBESITY (BMI 30-39.9): ICD-10-CM

## 2024-04-24 LAB
ALBUMIN SERPL-MCNC: 3.8 G/DL (ref 3.6–5.1)
ALBUMIN/GLOB SERPL: 1.1 {RATIO} (ref 1–2.4)
ALP SERPL-CCNC: 86 UNITS/L (ref 45–117)
ALT SERPL-CCNC: 41 UNITS/L
ANION GAP SERPL CALC-SCNC: 17 MMOL/L (ref 7–19)
AST SERPL-CCNC: 26 UNITS/L
BASOPHILS # BLD: 0 K/MCL (ref 0–0.3)
BASOPHILS NFR BLD: 1 %
BILIRUB SERPL-MCNC: 0.8 MG/DL (ref 0.2–1)
BUN SERPL-MCNC: 24 MG/DL (ref 6–20)
BUN/CREAT SERPL: 28 (ref 7–25)
CALCIUM SERPL-MCNC: 9.3 MG/DL (ref 8.4–10.2)
CHLORIDE SERPL-SCNC: 105 MMOL/L (ref 97–110)
CHOLEST SERPL-MCNC: 157 MG/DL
CHOLEST/HDLC SERPL: 2.5 {RATIO}
CO2 SERPL-SCNC: 26 MMOL/L (ref 21–32)
CREAT SERPL-MCNC: 0.85 MG/DL (ref 0.51–0.95)
DEPRECATED RDW RBC: 47.4 FL (ref 39–50)
EGFRCR SERPLBLD CKD-EPI 2021: 73 ML/MIN/{1.73_M2}
EOSINOPHIL # BLD: 0.2 K/MCL (ref 0–0.5)
EOSINOPHIL NFR BLD: 4 %
ERYTHROCYTE [DISTWIDTH] IN BLOOD: 13.8 % (ref 11–15)
FASTING DURATION TIME PATIENT: ABNORMAL H
GLOBULIN SER-MCNC: 3.5 G/DL (ref 2–4)
GLUCOSE SERPL-MCNC: 91 MG/DL (ref 70–99)
HBA1C MFR BLD: 6 % (ref 4.5–5.6)
HCT VFR BLD CALC: 37.9 % (ref 36–46.5)
HDLC SERPL-MCNC: 64 MG/DL
HGB BLD-MCNC: 12.3 G/DL (ref 12–15.5)
IMM GRANULOCYTES # BLD AUTO: 0 K/MCL (ref 0–0.2)
IMM GRANULOCYTES # BLD: 0 %
LDLC SERPL CALC-MCNC: 86 MG/DL
LYMPHOCYTES # BLD: 1.9 K/MCL (ref 1–4)
LYMPHOCYTES NFR BLD: 31 %
MCH RBC QN AUTO: 30.2 PG (ref 26–34)
MCHC RBC AUTO-ENTMCNC: 32.5 G/DL (ref 32–36.5)
MCV RBC AUTO: 93.1 FL (ref 78–100)
MONOCYTES # BLD: 0.7 K/MCL (ref 0.3–0.9)
MONOCYTES NFR BLD: 12 %
NEUTROPHILS # BLD: 3.3 K/MCL (ref 1.8–7.7)
NEUTROPHILS NFR BLD: 52 %
NONHDLC SERPL-MCNC: 93 MG/DL
NRBC BLD MANUAL-RTO: 0 /100 WBC
PLATELET # BLD AUTO: 260 K/MCL (ref 140–450)
POTASSIUM SERPL-SCNC: 4 MMOL/L (ref 3.4–5.1)
PROT SERPL-MCNC: 7.3 G/DL (ref 6.4–8.2)
RBC # BLD: 4.07 MIL/MCL (ref 4–5.2)
SODIUM SERPL-SCNC: 144 MMOL/L (ref 135–145)
TRIGL SERPL-MCNC: 37 MG/DL
TSH SERPL-ACNC: 0.19 MCUNITS/ML (ref 0.35–5)
WBC # BLD: 6.2 K/MCL (ref 4.2–11)

## 2024-04-24 PROCEDURE — 36415 COLL VENOUS BLD VENIPUNCTURE: CPT | Performed by: INTERNAL MEDICINE

## 2024-04-24 PROCEDURE — 83036 HEMOGLOBIN GLYCOSYLATED A1C: CPT | Performed by: INTERNAL MEDICINE

## 2024-04-24 PROCEDURE — 84443 ASSAY THYROID STIM HORMONE: CPT | Performed by: INTERNAL MEDICINE

## 2024-04-24 PROCEDURE — 80061 LIPID PANEL: CPT | Performed by: INTERNAL MEDICINE

## 2024-04-24 PROCEDURE — 80053 COMPREHEN METABOLIC PANEL: CPT | Performed by: INTERNAL MEDICINE

## 2024-04-24 PROCEDURE — 85025 COMPLETE CBC W/AUTO DIFF WBC: CPT | Performed by: INTERNAL MEDICINE

## 2024-04-26 ENCOUNTER — E-ADVICE (OUTPATIENT)
Dept: FAMILY MEDICINE | Age: 69
End: 2024-04-26

## 2024-04-26 DIAGNOSIS — Z51.81 MEDICATION MONITORING ENCOUNTER: Primary | ICD-10-CM

## 2024-04-26 DIAGNOSIS — E03.9 HYPOTHYROIDISM, UNSPECIFIED TYPE: ICD-10-CM

## 2024-04-26 RX ORDER — LEVOTHYROXINE SODIUM 0.12 MG/1
125 TABLET ORAL DAILY
Qty: 90 TABLET | Refills: 1 | Status: SHIPPED | OUTPATIENT
Start: 2024-04-26

## 2024-05-03 DIAGNOSIS — F41.1 GAD (GENERALIZED ANXIETY DISORDER): ICD-10-CM

## 2024-05-03 RX ORDER — BUSPIRONE HYDROCHLORIDE 7.5 MG/1
7.5 TABLET ORAL DAILY
Qty: 90 TABLET | Refills: 0 | Status: SHIPPED | OUTPATIENT
Start: 2024-05-03

## 2024-05-13 ENCOUNTER — TELEPHONE (OUTPATIENT)
Dept: FAMILY MEDICINE | Facility: CLINIC | Age: 69
End: 2024-05-13
Payer: COMMERCIAL

## 2024-05-13 NOTE — TELEPHONE ENCOUNTER
Patient Quality Outreach    Patient is due for the following:   Depression  -  PHQ-2  Physical Annual Wellness Visit    Next Steps:   Patient was assigned appropriate questionnaire to complete    Type of outreach:    Sent [employer] message.      Questions for provider review:    None           Pilar Connell

## 2024-05-14 ENCOUNTER — APPOINTMENT (OUTPATIENT)
Dept: PODIATRY | Age: 69
End: 2024-05-14

## 2024-05-14 DIAGNOSIS — M20.41 HAMMER TOES OF BOTH FEET: Primary | ICD-10-CM

## 2024-05-14 DIAGNOSIS — M20.42 HAMMER TOES OF BOTH FEET: Primary | ICD-10-CM

## 2024-05-14 DIAGNOSIS — L60.0 INGROWN NAIL: ICD-10-CM

## 2024-05-20 ENCOUNTER — APPOINTMENT (OUTPATIENT)
Dept: CARDIOLOGY | Age: 69
End: 2024-05-20
Attending: INTERNAL MEDICINE

## 2024-05-20 ENCOUNTER — TELEPHONE (OUTPATIENT)
Dept: CARDIOLOGY | Age: 69
End: 2024-05-20

## 2024-05-20 DIAGNOSIS — I25.10 CORONARY ARTERY DISEASE INVOLVING NATIVE CORONARY ARTERY OF NATIVE HEART WITHOUT ANGINA PECTORIS: ICD-10-CM

## 2024-05-20 DIAGNOSIS — I10 ESSENTIAL HYPERTENSION: ICD-10-CM

## 2024-05-20 LAB
AORTIC VALVE AREA (AVA): 0.87
AORTIC VALVE AREA: 2.06
AV MEAN GRADIENT (AVMG): 11.3
AV MEAN VELOCITY (AVMV): 1.58
AV PEAK GRADIENT (AVPG): 18.8
AV PEAK VELOCITY (AVPV): 2.17
AV STENOSIS SEVERITY TEXT: NORMAL
AVI LVOT PEAK GRADIENT (LVOTMG): 0.9
E WAVE DECELARATION TIME (MDT): 9.35
INTERVENTRICULAR SEPTUM IN END DIASTOLE (IVSD): 2.56
LEFT INTERNAL DIMENSION IN SYSTOLE (LVSD): 0.8
LEFT VENTRICULAR INTERNAL DIMENSION IN DIASTOLE (LVDD): 3.2
LEFT VENTRICULAR POSTERIOR WALL IN END DIASTOLE (LVPW): 5.2
LV EF: NORMAL %
LVOT 2D (LVOTD): 28
LVOT VTI (LVOTVTI): 1.14
MV E TISSUE VEL LAT (MELV): 1.27
MV E TISSUE VEL MED (MESV): 8.49
MV E WAVE VEL/E TISSUE VEL MED(MSR): 9.25
MV PEAK A VELOCITY (MVPAV): 191
MV PEAK E VELOCITY (MVPEV): 1.06
RV END SYSTOLIC LONGITUDINAL STRAIN FREE WALL (RVGS): 2.2
TRICUSPID VALVE PEAK REGURGITATION VELOCITY (TRPV): 3.4

## 2024-05-20 PROCEDURE — 93306 TTE W/DOPPLER COMPLETE: CPT | Performed by: INTERNAL MEDICINE

## 2024-05-21 ENCOUNTER — E-ADVICE (OUTPATIENT)
Dept: CARDIOLOGY | Age: 69
End: 2024-05-21

## 2024-05-23 RX ORDER — VERAPAMIL HYDROCHLORIDE 180 MG/1
180 CAPSULE, EXTENDED RELEASE ORAL NIGHTLY
Qty: 90 CAPSULE | Refills: 1 | Status: SHIPPED | OUTPATIENT
Start: 2024-05-23

## 2024-05-24 ENCOUNTER — LAB SERVICES (OUTPATIENT)
Dept: LAB | Age: 69
End: 2024-05-24

## 2024-05-24 DIAGNOSIS — E03.9 HYPOTHYROIDISM, UNSPECIFIED TYPE: ICD-10-CM

## 2024-05-24 DIAGNOSIS — Z51.81 MEDICATION MONITORING ENCOUNTER: ICD-10-CM

## 2024-05-24 LAB
T3FREE SERPL-MCNC: 2.8 PG/ML (ref 2.2–4)
T4 FREE SERPL-MCNC: 1.3 NG/DL (ref 0.8–1.5)
TSH SERPL-ACNC: 0.22 MCUNITS/ML (ref 0.35–5)

## 2024-05-24 PROCEDURE — 84481 FREE ASSAY (FT-3): CPT | Performed by: CLINICAL MEDICAL LABORATORY

## 2024-05-24 PROCEDURE — 84443 ASSAY THYROID STIM HORMONE: CPT | Performed by: INTERNAL MEDICINE

## 2024-05-24 PROCEDURE — 84439 ASSAY OF FREE THYROXINE: CPT | Performed by: INTERNAL MEDICINE

## 2024-05-24 PROCEDURE — 36415 COLL VENOUS BLD VENIPUNCTURE: CPT | Performed by: FAMILY MEDICINE

## 2024-05-24 RX ORDER — CICLOPIROX 80 MG/ML
SOLUTION TOPICAL NIGHTLY
Qty: 6.6 ML | Refills: 0 | Status: SHIPPED | OUTPATIENT
Start: 2024-05-24

## 2024-05-28 ASSESSMENT — ENCOUNTER SYMPTOMS
COLOR CHANGE: 1
ACTIVITY CHANGE: 1

## 2024-05-29 DIAGNOSIS — E03.9 HYPOTHYROIDISM, UNSPECIFIED TYPE: Primary | ICD-10-CM

## 2024-06-05 RX ORDER — OMEPRAZOLE 40 MG/1
40 CAPSULE, DELAYED RELEASE ORAL 2 TIMES DAILY
Qty: 180 CAPSULE | Refills: 0 | Status: SHIPPED | OUTPATIENT
Start: 2024-06-05

## 2024-06-05 NOTE — TELEPHONE ENCOUNTER
Patient Quality Outreach    Patient is due for the following:   Depression  -  phq-2  Physical Annual Wellness Visit    Next Steps:   Patient has upcoming appointment, these items will be addressed at that time.    Type of outreach:    Chart review performed, no outreach needed.      Questions for provider review:    None           Pilar Connell

## 2024-06-06 ENCOUNTER — PATIENT OUTREACH (OUTPATIENT)
Dept: CARE COORDINATION | Facility: CLINIC | Age: 69
End: 2024-06-06
Payer: COMMERCIAL

## 2024-06-25 RX ORDER — LOSARTAN POTASSIUM 100 MG/1
100 TABLET ORAL DAILY
Qty: 90 TABLET | Refills: 0 | Status: SHIPPED | OUTPATIENT
Start: 2024-06-25

## 2024-07-04 ENCOUNTER — PATIENT OUTREACH (OUTPATIENT)
Dept: CARE COORDINATION | Facility: CLINIC | Age: 69
End: 2024-07-04
Payer: COMMERCIAL

## 2024-07-23 DIAGNOSIS — E78.5 HYPERLIPIDEMIA LDL GOAL <130: ICD-10-CM

## 2024-07-24 ENCOUNTER — LAB SERVICES (OUTPATIENT)
Dept: LAB | Age: 69
End: 2024-07-24

## 2024-07-24 DIAGNOSIS — E03.9 HYPOTHYROIDISM, UNSPECIFIED TYPE: ICD-10-CM

## 2024-07-24 LAB — TSH SERPL-ACNC: 0.46 MCUNITS/ML (ref 0.35–5)

## 2024-07-24 PROCEDURE — 84443 ASSAY THYROID STIM HORMONE: CPT | Performed by: INTERNAL MEDICINE

## 2024-07-24 PROCEDURE — 36415 COLL VENOUS BLD VENIPUNCTURE: CPT | Performed by: FAMILY MEDICINE

## 2024-07-24 SDOH — ECONOMIC STABILITY: FOOD INSECURITY: WITHIN THE PAST 12 MONTHS, THE FOOD YOU BOUGHT JUST DIDN'T LAST AND YOU DIDN'T HAVE MONEY TO GET MORE.: NEVER TRUE

## 2024-07-24 SDOH — HEALTH STABILITY: PHYSICAL HEALTH: ON AVERAGE, HOW MANY DAYS PER WEEK DO YOU ENGAGE IN MODERATE TO STRENUOUS EXERCISE (LIKE A BRISK WALK)?: 5 DAYS

## 2024-07-24 SDOH — ECONOMIC STABILITY: HOUSING INSECURITY: DO YOU HAVE PROBLEMS WITH ANY OF THE FOLLOWING?: NONE OF THE ABOVE

## 2024-07-24 SDOH — HEALTH STABILITY: PHYSICAL HEALTH: ON AVERAGE, HOW MANY MINUTES DO YOU ENGAGE IN EXERCISE AT THIS LEVEL?: 20 MIN

## 2024-07-24 SDOH — SOCIAL STABILITY: SOCIAL NETWORK
HOW OFTEN DO YOU SEE OR TALK TO PEOPLE THAT YOU CARE ABOUT AND FEEL CLOSE TO? (FOR EXAMPLE: TALKING TO FRIENDS ON THE PHONE, VISITING FRIENDS OR FAMILY, GOING TO CHURCH OR CLUB MEETINGS): 3 TO 5 TIMES A WEEK

## 2024-07-24 SDOH — ECONOMIC STABILITY: GENERAL

## 2024-07-24 SDOH — ECONOMIC STABILITY: HOUSING INSECURITY: WHAT IS YOUR LIVING SITUATION TODAY?: I HAVE A STEADY PLACE TO LIVE

## 2024-07-24 SDOH — ECONOMIC STABILITY: TRANSPORTATION INSECURITY
IN THE PAST 12 MONTHS, HAS LACK OF RELIABLE TRANSPORTATION KEPT YOU FROM MEDICAL APPOINTMENTS, MEETINGS, WORK OR FROM GETTING THINGS NEEDED FOR DAILY LIVING?: NO

## 2024-07-24 ASSESSMENT — PATIENT HEALTH QUESTIONNAIRE - PHQ9
CLINICAL INTERPRETATION OF PHQ2 SCORE: 0
1. LITTLE INTEREST OR PLEASURE IN DOING THINGS: NOT AT ALL
SUM OF ALL RESPONSES TO PHQ9 QUESTIONS 1 AND 2: 0
2. FEELING DOWN, DEPRESSED OR HOPELESS: NOT AT ALL
CLINICAL INTERPRETATION OF PHQ2 SCORE: NO FURTHER SCREENING NEEDED

## 2024-07-24 ASSESSMENT — LIFESTYLE VARIABLES
HOW OFTEN DO YOU HAVE A DRINK CONTAINING ALCOHOL: NEVER
HOW MANY STANDARD DRINKS CONTAINING ALCOHOL DO YOU HAVE ON A TYPICAL DAY: 0,1 OR 2
AUDIT-C TOTAL SCORE: 0

## 2024-07-24 ASSESSMENT — SOCIAL DETERMINANTS OF HEALTH (SDOH): IN THE PAST 12 MONTHS, HAS THE ELECTRIC, GAS, OIL, OR WATER COMPANY THREATENED TO SHUT OFF SERVICE IN YOUR HOME?: NO

## 2024-07-25 DIAGNOSIS — E03.9 HYPOTHYROIDISM, UNSPECIFIED TYPE: Primary | ICD-10-CM

## 2024-07-25 RX ORDER — SIMVASTATIN 10 MG
10 TABLET ORAL AT BEDTIME
Qty: 90 TABLET | Refills: 0 | Status: SHIPPED | OUTPATIENT
Start: 2024-07-25 | End: 2024-09-13

## 2024-07-25 NOTE — TELEPHONE ENCOUNTER
"Chief Complaint   Patient presents with     Sinus Problem       Initial /72 (BP Location: Left arm, Patient Position: Chair, Cuff Size: Adult Regular)  Pulse 83  Temp 96.7  F (35.9  C) (Tympanic)  Resp 16  Ht 5' 7\" (1.702 m)  Wt 132 lb 9.6 oz (60.1 kg)  LMP 07/04/2017 (Approximate)  SpO2 100%  BMI 20.77 kg/m2 Estimated body mass index is 20.77 kg/(m^2) as calculated from the following:    Height as of this encounter: 5' 7\" (1.702 m).    Weight as of this encounter: 132 lb 9.6 oz (60.1 kg).  Medication Reconciliation: complete    Saadia Odonnell MA  " Approved 3 months. Please reschedule annual visit that was canceled.  Zelda Garcia MD

## 2024-07-26 NOTE — TELEPHONE ENCOUNTER
Attempted calling patient, no answer, left voicemail asking for patient to return call. Please let patient know that medication was refilled with a 3 month supply and schedule an AWV.     Yudy Resendiz,

## 2024-07-26 NOTE — TELEPHONE ENCOUNTER
Patient called back and relayed message.  She scheduled with KW but would like to see pcp before next available in 02/25.  She is asking if pcp can work her in.  She would like callback.

## 2024-07-30 ENCOUNTER — APPOINTMENT (OUTPATIENT)
Dept: FAMILY MEDICINE | Age: 69
End: 2024-07-30

## 2024-07-30 VITALS
HEART RATE: 76 BPM | HEIGHT: 66 IN | SYSTOLIC BLOOD PRESSURE: 136 MMHG | BODY MASS INDEX: 32.95 KG/M2 | TEMPERATURE: 97.9 F | DIASTOLIC BLOOD PRESSURE: 82 MMHG | WEIGHT: 205 LBS | RESPIRATION RATE: 18 BRPM

## 2024-07-30 DIAGNOSIS — Z00.00 MEDICARE ANNUAL WELLNESS VISIT, SUBSEQUENT: Primary | ICD-10-CM

## 2024-07-30 DIAGNOSIS — F41.9 ANXIETY: ICD-10-CM

## 2024-07-30 DIAGNOSIS — E03.8 OTHER SPECIFIED HYPOTHYROIDISM: ICD-10-CM

## 2024-07-30 PROBLEM — R06.02 SHORTNESS OF BREATH: Status: RESOLVED | Noted: 2020-05-29 | Resolved: 2024-07-30

## 2024-07-30 PROCEDURE — 99214 OFFICE O/P EST MOD 30 MIN: CPT | Performed by: FAMILY MEDICINE

## 2024-07-30 PROCEDURE — G0439 PPPS, SUBSEQ VISIT: HCPCS | Performed by: FAMILY MEDICINE

## 2024-07-30 RX ORDER — OMEPRAZOLE 40 MG/1
40 CAPSULE, DELAYED RELEASE ORAL DAILY
Qty: 90 CAPSULE | Refills: 1 | Status: SHIPPED | OUTPATIENT
Start: 2024-07-30

## 2024-07-30 ASSESSMENT — PATIENT HEALTH QUESTIONNAIRE - PHQ9
SUM OF ALL RESPONSES TO PHQ9 QUESTIONS 1 AND 2: 0
2. FEELING DOWN, DEPRESSED OR HOPELESS: NOT AT ALL
CLINICAL INTERPRETATION OF PHQ2 SCORE: NO FURTHER SCREENING NEEDED
1. LITTLE INTEREST OR PLEASURE IN DOING THINGS: NOT AT ALL
SUM OF ALL RESPONSES TO PHQ9 QUESTIONS 1 AND 2: 0

## 2024-07-30 ASSESSMENT — MINI COG
PATIENT ABLE TO REPEAT THE 3 WORDS GIVEN PREVIOUSLY?: WAS ABLE TO REPEAT BACK 3 WORDS CORRECTLY
PATIENT WAS GIVEN REPEAT BACK WORDS FROM VERSION: 2 - LEADER SEASON TABLE
TOTAL SCORE: 5
PATIENT ABLE TO FILL IN THE CLOCK FACE WITH 10 MINUTES PAST 11 O'CLOCK?: YES, CLOCK IS CORRECT

## 2024-07-30 ASSESSMENT — ACTIVITIES OF DAILY LIVING (ADL)
ADL_BEFORE_ADMISSION: INDEPENDENT
NEEDS_ASSIST: NO
RECENT_DECLINE_ADL: NO
SENSORY_SUPPORT_DEVICES: EYEGLASSES
ADL_SCORE: 12
ADL_SHORT_OF_BREATH: NO

## 2024-07-30 ASSESSMENT — COGNITIVE AND FUNCTIONAL STATUS - GENERAL
BECAUSE OF A PHYSICAL, MENTAL, OR EMOTIONAL CONDITION, DO YOU HAVE DIFFICULTY DOING ERRANDS ALONE: NO
BECAUSE OF A PHYSICAL, MENTAL, OR EMOTIONAL CONDITION, DO YOU HAVE SERIOUS DIFFICULTY CONCENTRATING, REMEMBERING OR MAKING DECISIONS: NO
DO YOU HAVE SERIOUS DIFFICULTY WALKING OR CLIMBING STAIRS: NO
DO YOU HAVE DIFFICULTY DRESSING OR BATHING: NO

## 2024-08-01 DIAGNOSIS — F41.1 GAD (GENERALIZED ANXIETY DISORDER): ICD-10-CM

## 2024-08-01 RX ORDER — BUSPIRONE HYDROCHLORIDE 7.5 MG/1
7.5 TABLET ORAL DAILY
Qty: 90 TABLET | Refills: 1 | Status: SHIPPED | OUTPATIENT
Start: 2024-08-01

## 2024-08-02 DIAGNOSIS — I10 ESSENTIAL HYPERTENSION: ICD-10-CM

## 2024-08-02 RX ORDER — HYDROCHLOROTHIAZIDE 25 MG/1
50 TABLET ORAL DAILY
Qty: 180 TABLET | Refills: 3 | Status: SHIPPED | OUTPATIENT
Start: 2024-08-02

## 2024-08-05 NOTE — TELEPHONE ENCOUNTER
Contacted patient, discussed scheduling options, patient was agreeable to continue with appointment for AWV with GAVI and do a follow up with BLAIR afterward in November.     Yudy Resendiz, VF

## 2024-08-12 DIAGNOSIS — E03.8 SUBCLINICAL HYPOTHYROIDISM: ICD-10-CM

## 2024-08-12 RX ORDER — LEVOTHYROXINE SODIUM 88 UG/1
88 TABLET ORAL DAILY
Qty: 90 TABLET | Refills: 1 | Status: SHIPPED | OUTPATIENT
Start: 2024-08-12 | End: 2024-09-13

## 2024-08-13 ENCOUNTER — MYC MEDICAL ADVICE (OUTPATIENT)
Dept: FAMILY MEDICINE | Facility: CLINIC | Age: 69
End: 2024-08-13
Payer: COMMERCIAL

## 2024-09-13 ENCOUNTER — OFFICE VISIT (OUTPATIENT)
Dept: FAMILY MEDICINE | Facility: CLINIC | Age: 69
End: 2024-09-13
Payer: COMMERCIAL

## 2024-09-13 VITALS
RESPIRATION RATE: 17 BRPM | WEIGHT: 293 LBS | SYSTOLIC BLOOD PRESSURE: 112 MMHG | TEMPERATURE: 96.9 F | OXYGEN SATURATION: 100 % | HEART RATE: 67 BPM | DIASTOLIC BLOOD PRESSURE: 76 MMHG | HEIGHT: 72 IN | BODY MASS INDEX: 39.68 KG/M2

## 2024-09-13 DIAGNOSIS — E66.01 MORBID OBESITY (H): ICD-10-CM

## 2024-09-13 DIAGNOSIS — E78.5 HYPERLIPIDEMIA LDL GOAL <130: ICD-10-CM

## 2024-09-13 DIAGNOSIS — R73.03 PREDIABETES: ICD-10-CM

## 2024-09-13 DIAGNOSIS — K44.9 HIATAL HERNIA: ICD-10-CM

## 2024-09-13 DIAGNOSIS — E03.8 SUBCLINICAL HYPOTHYROIDISM: ICD-10-CM

## 2024-09-13 DIAGNOSIS — Z00.00 ENCOUNTER FOR MEDICARE ANNUAL WELLNESS EXAM: Primary | ICD-10-CM

## 2024-09-13 DIAGNOSIS — Z78.0 ASYMPTOMATIC MENOPAUSAL STATE: ICD-10-CM

## 2024-09-13 DIAGNOSIS — K21.9 GASTROESOPHAGEAL REFLUX DISEASE WITHOUT ESOPHAGITIS: ICD-10-CM

## 2024-09-13 DIAGNOSIS — Z12.31 VISIT FOR SCREENING MAMMOGRAM: ICD-10-CM

## 2024-09-13 LAB
ANION GAP SERPL CALCULATED.3IONS-SCNC: 10 MMOL/L (ref 7–15)
BUN SERPL-MCNC: 16.5 MG/DL (ref 8–23)
CALCIUM SERPL-MCNC: 8.9 MG/DL (ref 8.8–10.4)
CHLORIDE SERPL-SCNC: 108 MMOL/L (ref 98–107)
CHOLEST SERPL-MCNC: 154 MG/DL
CREAT SERPL-MCNC: 0.8 MG/DL (ref 0.51–0.95)
EGFRCR SERPLBLD CKD-EPI 2021: 78 ML/MIN/1.73M2
FASTING STATUS PATIENT QL REPORTED: YES
FASTING STATUS PATIENT QL REPORTED: YES
GLUCOSE SERPL-MCNC: 106 MG/DL (ref 70–99)
HBA1C MFR BLD: 5.8 %
HCO3 SERPL-SCNC: 24 MMOL/L (ref 22–29)
HDLC SERPL-MCNC: 65 MG/DL
LDLC SERPL CALC-MCNC: 71 MG/DL
NONHDLC SERPL-MCNC: 89 MG/DL
POTASSIUM SERPL-SCNC: 4.9 MMOL/L (ref 3.4–5.3)
SODIUM SERPL-SCNC: 142 MMOL/L (ref 135–145)
TRIGL SERPL-MCNC: 89 MG/DL
TSH SERPL DL<=0.005 MIU/L-ACNC: 2.32 UIU/ML (ref 0.3–4.2)

## 2024-09-13 PROCEDURE — G0439 PPPS, SUBSEQ VISIT: HCPCS | Performed by: NURSE PRACTITIONER

## 2024-09-13 PROCEDURE — 36415 COLL VENOUS BLD VENIPUNCTURE: CPT | Performed by: NURSE PRACTITIONER

## 2024-09-13 PROCEDURE — 84443 ASSAY THYROID STIM HORMONE: CPT | Performed by: NURSE PRACTITIONER

## 2024-09-13 PROCEDURE — 80048 BASIC METABOLIC PNL TOTAL CA: CPT | Performed by: NURSE PRACTITIONER

## 2024-09-13 PROCEDURE — 83036 HEMOGLOBIN GLYCOSYLATED A1C: CPT | Performed by: NURSE PRACTITIONER

## 2024-09-13 PROCEDURE — 80061 LIPID PANEL: CPT | Performed by: NURSE PRACTITIONER

## 2024-09-13 PROCEDURE — 99214 OFFICE O/P EST MOD 30 MIN: CPT | Mod: 25 | Performed by: NURSE PRACTITIONER

## 2024-09-13 RX ORDER — LEVOTHYROXINE SODIUM 88 UG/1
88 TABLET ORAL DAILY
Qty: 90 TABLET | Refills: 3 | Status: SHIPPED | OUTPATIENT
Start: 2024-09-13

## 2024-09-13 RX ORDER — SIMVASTATIN 10 MG
10 TABLET ORAL AT BEDTIME
Qty: 90 TABLET | Refills: 3 | Status: SHIPPED | OUTPATIENT
Start: 2024-09-13

## 2024-09-13 ASSESSMENT — PAIN SCALES - GENERAL: PAINLEVEL: MILD PAIN (2)

## 2024-09-13 NOTE — PROGRESS NOTES
Preventive Care Visit  [facility]  Osiris Lew, EWA, Nurse Practitioner - Family  Sep 13, 2024      Assessment & Plan     Encounter for Medicare annual wellness exam  Discussed recommended vaccines    Morbid obesity (H)  Has tried numerous diet and exercise programs in the past.  Would like to consider medication- referral MTM to discuss.  Did briefly discuss GLP-1, wellbutrin, naltrexone.  No family history of medullary thyroid cancer  - Basic metabolic panel  (Ca, Cl, CO2, Creat, Gluc, K, Na, BUN); Future  - Med Therapy Management Referral  - Basic metabolic panel  (Ca, Cl, CO2, Creat, Gluc, K, Na, BUN)    Subclinical hypothyroidism  Stable on medication.  Check labs  - TSH WITH FREE T4 REFLEX; Future  - levothyroxine (SYNTHROID/LEVOTHROID) 88 MCG tablet; Take 1 tablet (88 mcg) by mouth daily.  - TSH WITH FREE T4 REFLEX    Hyperlipidemia LDL goal <130  Stable on medication- fasting labs today  - Lipid panel reflex to direct LDL Non-fasting; Future  - simvastatin (ZOCOR) 10 MG tablet; Take 1 tablet (10 mg) by mouth at bedtime.  - Lipid panel reflex to direct LDL Non-fasting    Gastroesophageal reflux disease without esophagitis  Controlled on PPI  - omeprazole (PRILOSEC) 20 MG DR capsule; Take 1 capsule (20 mg) by mouth daily.    Prediabetes  Recheck today  - Hemoglobin A1c; Future  - Hemoglobin A1c    Asymptomatic menopausal state  future  - DEXA HIP/PELVIS/SPINE - Future; Future    Hiatal hernia  Controlled on PPI  - omeprazole (PRILOSEC) 20 MG DR capsule; Take 1 capsule (20 mg) by mouth daily.    Visit for screening mammogram  screen  - MA Screening Bilateral w/ [facility]o; Future    Patient has been advised of split billing requirements and indicates understanding: Yes        BMI  Estimated body mass index is 41.77 kg/m  as calculated from the following:    Height as of this encounter: 1.829 m (6').    Weight as of this encounter: 139.7 kg (308 lb).   Weight management plan:  Discussed healthy diet and exercise guidelines    Counseling  Appropriate preventive services were addressed with this patient via screening, questionnaire, or discussion as appropriate for fall prevention, nutrition, physical activity, Tobacco-use cessation, social engagement, weight loss and cognition.  Checklist reviewing preventive services available has been given to the patient.  Reviewed patient's diet, addressing concerns and/or questions.   Information on urinary incontinence and treatment options given to patient.     Weight- working at gym 2-3 times per week.      No family history medullary thyroid cancer    Subjective   Ashli is a 69 year old, presenting for the following:  Physical (Annual wellness /Fall risk, actively working on weightloss, stability, foot pain, incontinence with aging )        9/13/2024     9:25 AM   Additional Questions   Roomed by [employer] Care Directive  Patient does not have a Health Care Directive or Living Will: Patient states has Advance Directive and will bring in a copy to clinic.    HPI              9/13/2024   General Health   How would you rate your overall physical health? Good   Feel stress (tense, anxious, or unable to sleep) Not at all            9/13/2024   Nutrition   Diet: Gluten-free/reduced            9/13/2024   Exercise   Days per week of moderate/strenous exercise 4 days   Average minutes spent exercising at this level 60 min            9/13/2024   Social Factors   Frequency of gathering with friends or relatives Three times a week   Worry food won't last until get money to buy more No   Food not last or not have enough money for food? No   Do you have housing? (Housing is defined as stable permanent housing and does not include staying ouside in a car, in a tent, in an abandoned building, in an overnight shelter, or couch-surfing.) Yes   Are you worried about losing your housing? No   Lack of transportation? No   Unable to get utilities  (heat,electricity)? No            9/13/2024   Fall Risk   Fallen 2 or more times in the past year? Yes    Yes   Trouble with walking or balance? Yes    Yes   Gait Speed Test (Document in seconds) 5.4   Gait Speed Test Interpretation Greater than 5.01 seconds - ABNORMAL       Multiple values from one day are sorted in reverse-chronological order          9/13/2024   Activities of Daily Living- Home Safety   Needs help with the following daily activites None of the above   Safety concerns in the home None of the above            9/13/2024   Dental   Dentist two times every year? Yes            9/13/2024   Hearing Screening   Hearing concerns? None of the above            9/13/2024   Driving Risk Screening   Patient/family members have concerns about driving No            9/13/2024   General Alertness/Fatigue Screening   Have you been more tired than usual lately? No            9/13/2024   Urinary Incontinence Screening   Bothered by leaking urine in past 6 months Yes            9/13/2024   TB Screening   Were you born outside of the [place]? No            Today's PHQ-2 Score:       9/13/2024     9:20 AM   PHQ-2 ( 1999 [employer])   Q1: Little interest or pleasure in doing things 0   Q2: Feeling down, depressed or hopeless 0   PHQ-2 Score 0   Q1: Little interest or pleasure in doing things Not at all   Q2: Feeling down, depressed or hopeless Not at all   PHQ-2 Score 0           9/13/2024   Substance Use   Alcohol more than 3/day or more than 7/wk No   Do you have a current opioid prescription? No   How severe/bad is pain from 1 to 10? 5/10   Do you use any other substances recreationally? No        Social History     Tobacco Use    Smoking status: Some Days     Types: Cigarettes, Other    Smokeless tobacco: Never    Tobacco comments:     Marijuana    Vaping Use    Vaping status: Never Used   Substance Use Topics    Alcohol use: Yes     Comment: 2 drinks per month    Drug use: No           7/6/2022   LAST FHS-7 RESULTS   1st  degree relative breast or ovarian cancer No   Any relative bilateral breast cancer No   Any male have breast cancer No   Any ONE woman have BOTH breast AND ovarian cancer No   Any woman with breast cancer before 50yrs No   2 or more relatives with breast AND/OR ovarian cancer No   2 or more relatives with breast AND/OR bowel cancer No           Mammogram Screening - Mammogram every 1-2 years updated in Health Maintenance based on mutual decision making    ASCVD Risk   The 10-year ASCVD risk score (Wilma MOMIN, et al., 2019) is: 12.5%    Values used to calculate the score:      Age: 69 years      Sex: Female      Is Non- : No      Diabetic: No      Tobacco smoker: Yes      Systolic Blood Pressure: 112 mmHg      Is BP treated: No      HDL Cholesterol: 59 mg/dL      Total Cholesterol: 181 mg/dL    Fracture Risk Assessment Tool  Link to Frax Calculator  Use the information below to complete the Frax calculator  : 1955  Sex: female  Weight (kg): 139.7 kg (actual weight)  Height (cm): 182.9 cm  Previous Fragility Fracture:  No  History of parent with fractured hip:  No  Current Smoking:  No  Patient has been on glucocorticoids for more than 3 months (5mg/day or more): No  Rheumatoid Arthritis on Problem List:  No  Secondary Osteoporosis on Problem List:  No  Consumes 3 or more units of alcohol per day: No  Femoral Neck BMD (g/cm2)            Reviewed and updated as needed this visit by Provider                    Past Medical History:   Diagnosis Date    Cataracts, bilateral     Closed fracture of medial malleolus     Distal avulsion fracture of the medial malleoli    Diverticulitis of colon 2/3/2017    DJD (degenerative joint disease) 2011    right knee, s/p injections    Erythema nodosum     Generalized osteoarthrosis, unspecified site     Hips    S/P total hip arthroplasty 2003    right    S/P total knee arthroplasty     left    Sleep apnea     on CPAP     Past  Surgical History:   Procedure Laterality Date    COLONOSCOPY N/A 05/11/2016    normal, repeat 10 years    ESOPHAGOSCOPY, GASTROSCOPY, DUODENOSCOPY (EGD), COMBINED N/A 05/11/2016    Procedure: COMBINED ESOPHAGOSCOPY, GASTROSCOPY, DUODENOSCOPY (EGD), BIOPSY SINGLE OR MULTIPLE;  Surgeon: Ervin Johnston MD;  Location: [facility]    FINGER SURGERY Right 04/2019    ganglion cyst removed    PHACOEMULSIFICATION WITH STANDARD INTRAOCULAR LENS IMPLANT Left 10/7/2021    Procedure: PHACOEMULSIFICATION, CATARACT, WITH MULTIFOCAL INTRAOCULAR LENS IMPLANT INSERTION, left;  Surgeon: Jordan Collins MD;  Location: [facility]    PHACOEMULSIFICATION WITH STANDARD INTRAOCULAR LENS IMPLANT Right 10/21/2021    Procedure: PHACOEMULSIFICATION, CATARACT, WITH MULTIFOCAL INTRAOCULAR LENS IMPLANT INSERTION,right;  Surgeon: Praveen Guy MD;  Location: [facility] OR    ZZC TOTAL HIP ARTHROPLASTY Right 04/26/2004    Hip Replacement, Total    ZZC TOTAL KNEE ARTHROPLASTY Left 2013    ZZHC COLONOSCOPY W/WO BRUSH/WASH  01/11/2006     Current providers sharing in care for this patient include:  Patient Care Team:  Zelda Garcia MD as PCP - General (Family Practice)  Elvie Pagan RD as Diabetes Educator (Dietitian, Registered)  Mike Wilder MD as Assigned Surgical Provider  Zelda Garcia MD as Assigned PCP    The following health maintenance items are reviewed in Epic and correct as of today:  Health Maintenance   Topic Date Due    NICOTINE/TOBACCO CESSATION COUNSELING Q 1 YR  Never done    DEXA  Never done    LUNG CANCER SCREENING  Never done    MEDICARE ANNUAL WELLNESS VISIT  05/27/2023    ANNUAL REVIEW OF HM ORDERS  05/27/2023    LIPID  05/09/2024    MAMMO SCREENING  07/06/2024    TSH W/FREE T4 REFLEX  07/25/2024    INFLUENZA VACCINE (1) 09/01/2024    COVID-19 Vaccine (7 - 2023-24 season) 09/01/2024    ZOSTER IMMUNIZATION (3 of 3) 10/09/2024    FALL RISK ASSESSMENT  09/13/2025    DTAP/TDAP/TD IMMUNIZATION  (3 - Td or Tdap) 05/02/2026    GLUCOSE  05/09/2026    COLORECTAL CANCER SCREENING  05/11/2026    ADVANCE CARE PLANNING  05/27/2027    HEPATITIS C SCREENING  Completed    PHQ-2 (once per calendar year)  Completed    Pneumococcal Vaccine: 65+ Years  Completed    RSV VACCINE  Completed    HPV IMMUNIZATION  Aged Out    MENINGITIS IMMUNIZATION  Aged Out    RSV MONOCLONAL ANTIBODY  Aged Out         Review of Systems  Constitutional, HEENT, cardiovascular, pulmonary, GI, , musculoskeletal, neuro, skin, endocrine and psych systems are negative, except as otherwise noted.     Objective    Exam  /76   Pulse 67   Temp 96.9  F (36.1  C) (Temporal)   Resp 17   Ht 1.829 m (6')   Wt 139.7 kg (308 lb)   LMP 01/19/2005   SpO2 100%   BMI 41.77 kg/m     Estimated body mass index is 41.77 kg/m  as calculated from the following:    Height as of this encounter: 1.829 m (6').    Weight as of this encounter: 139.7 kg (308 lb).    Physical Exam  GENERAL: alert and no distress  EYES: Eyes grossly normal to inspection, PERRL and conjunctivae and sclerae normal  HENT: ear canals and TM's normal, nose and mouth without ulcers or lesions  NECK: no adenopathy, no asymmetry, masses, or scars  RESP: lungs clear to auscultation - no rales, rhonchi or wheezes  CV: regular rate and rhythm, normal S1 S2, no S3 or S4, no murmur, click or rub, no peripheral edema  ABDOMEN: soft, nontender, no hepatosplenomegaly, no masses and bowel sounds normal  MS: no gross musculoskeletal defects noted, no edema  SKIN: no suspicious lesions or rashes  NEURO: Normal strength and tone, mentation intact and speech normal  PSYCH: mentation appears normal, affect normal/bright        9/13/2024   Mini Cog   Clock Draw Score 2 Normal   3 Item Recall 3 objects recalled   Mini Cog Total Score 5                 Signed Electronically by: Osiris Lew NP

## 2024-09-13 NOTE — PATIENT INSTRUCTIONS
Patient Education   Preventive Care Advice   This is general advice given by our system to help you stay healthy. However, your care team may have specific advice just for you. Please talk to your care team about your preventive care needs.  Nutrition  Eat 5 or more servings of fruits and vegetables each day.  Try wheat bread, brown rice and whole grain pasta (instead of white bread, rice, and pasta).  Get enough calcium and vitamin D. Check the label on foods and aim for 100% of the RDA (recommended daily allowance).  Lifestyle  Exercise at least 150 minutes each week  (30 minutes a day, 5 days a week).  Do muscle strengthening activities 2 days a week. These help control your weight and prevent disease.  No smoking.  Wear sunscreen to prevent skin cancer.  Have a dental exam and cleaning every 6 months.  Yearly exams  See your health care team every year to talk about:  Any changes in your health.  Any medicines your care team has prescribed.  Preventive care, family planning, and ways to prevent chronic diseases.  Shots (vaccines)   HPV shots (up to age 26), if you've never had them before.  Hepatitis B shots (up to age 59), if you've never had them before.  COVID-19 shot: Get this shot when it's due.  Flu shot: Get a flu shot every year.  Tetanus shot: Get a tetanus shot every 10 years.  Pneumococcal, hepatitis A, and RSV shots: Ask your care team if you need these based on your risk.  Shingles shot (for age 50 and up)  General health tests  Diabetes screening:  Starting at age 35, Get screened for diabetes at least every 3 years.  If you are younger than age 35, ask your care team if you should be screened for diabetes.  Cholesterol test: At age 39, start having a cholesterol test every 5 years, or more often if advised.  Bone density scan (DEXA): At age 50, ask your care team if you should have this scan for osteoporosis (brittle bones).  Hepatitis C: Get tested at least once in your life.  STIs (sexually  transmitted infections)  Before age 24: Ask your care team if you should be screened for STIs.  After age 24: Get screened for STIs if you're at risk. You are at risk for STIs (including HIV) if:  You are sexually active with more than one person.  You don't use condoms every time.  You or a partner was diagnosed with a sexually transmitted infection.  If you are at risk for HIV, ask about PrEP medicine to prevent HIV.  Get tested for HIV at least once in your life, whether you are at risk for HIV or not.  Cancer screening tests  Cervical cancer screening: If you have a cervix, begin getting regular cervical cancer screening tests starting at age 21.  Breast cancer scan (mammogram): If you've ever had breasts, begin having regular mammograms starting at age 40. This is a scan to check for breast cancer.  Colon cancer screening: It is important to start screening for colon cancer at age 45.  Have a colonoscopy test every 10 years (or more often if you're at risk) Or, ask your provider about stool tests like a FIT test every year or Cologuard test every 3 years.  To learn more about your testing options, visit:   .  For help making a decision, visit:   https://bit.ly/tg93400.  Prostate cancer screening test: If you have a prostate, ask your care team if a prostate cancer screening test (PSA) at age 55 is right for you.  Lung cancer screening: If you are a current or former smoker ages 50 to 80, ask your care team if ongoing lung cancer screenings are right for you.  For informational purposes only. Not to replace the advice of your health care provider. Copyright   2023 [facility]. All rights reserved. Clinically reviewed by the [facility]. We Tribute 362141 - REV 01/24.  Preventing Falls: Care Instructions  Injuries and health problems such as trouble walking or poor eyesight can increase your risk of falling. So can some medicines. But there are things you can do to help  "prevent falls. You can exercise to get stronger. You can also arrange your home to make it safer.    Talk to your doctor about the medicines you take. Ask if any of them increase the risk of falls and whether they can be changed or stopped.   Try to exercise regularly. It can help improve your strength and balance. This can help lower your risk of falling.     Practice fall safety and prevention.    Wear low-heeled shoes that fit well and give your feet good support. Talk to your doctor if you have foot problems that make this hard.  Carry a cellphone or wear a medical alert device that you can use to call for help.  Use stepladders instead of chairs to reach high objects. Don't climb if you're at risk for falls. Ask for help, if needed.  Wear the correct eyeglasses, if you need them.    Make your home safer.    Remove rugs, cords, clutter, and furniture from walkways.  Keep your house well lit. Use night-lights in hallways and bathrooms.  Install and use sturdy handrails on stairways.  Wear nonskid footwear, even inside. Don't walk barefoot or in socks without shoes.    Be safe outside.    Use handrails, curb cuts, and ramps whenever possible.  Keep your hands free by using a shoulder bag or backpack.  Try to walk in well-lit areas. Watch out for uneven ground, changes in pavement, and debris.  Be careful in the winter. Walk on the grass or gravel when sidewalks are slippery. Use de-icer on steps and walkways. Add non-slip devices to shoes.    Put grab bars and nonskid mats in your shower or tub and near the toilet. Try to use a shower chair or bath bench when bathing.   Get into a tub or shower by putting in your weaker leg first. Get out with your strong side first. Have a phone or medical alert device in the bathroom with you.   Where can you learn more?  Go to https://www.[employer].net/patiented  Enter G117 in the search box to learn more about \"Preventing Falls: Care Instructions.\"  Current as of: July 17, " 2023               Content Version: 14.0    4338-1503 [employer].   Care instructions adapted under license by your healthcare professional. If you have questions about a medical condition or this instruction, always ask your healthcare professional. [employer] disclaims any warranty or liability for your use of this information.      Bladder Training: Care Instructions  Your Care Instructions     Bladder training is used to treat urge incontinence and stress incontinence. Urge incontinence means that the need to urinate comes on so fast that you can't get to a toilet in time. Stress incontinence means that you leak urine because of pressure on your bladder. For example, it may happen when you laugh, cough, or lift something heavy.  Bladder training can increase how long you can wait before you have to urinate. It can also help your bladder hold more urine. And it can give you better control over the urge to urinate.  It is important to remember that bladder training takes a few weeks to a few months to make a difference. You may not see results right away, but don't give up.  Follow-up care is a key part of your treatment and safety. Be sure to make and go to all appointments, and call your doctor if you are having problems. It's also a good idea to know your test results and keep a list of the medicines you take.  How can you care for yourself at home?  Work with your doctor to come up with a bladder training program that is right for you. You may use one or more of the following methods.  Delayed urination  In the beginning, try to keep from urinating for 5 minutes after you first feel the need to go.  While you wait, take deep, slow breaths to relax. Kegel exercises can also help you delay the need to go to the bathroom.  After some practice, when you can easily wait 5 minutes to urinate, try to wait 10 minutes before you urinate.  Slowly increase the waiting period until you are able  "to control when you have to urinate.  Scheduled urination  Empty your bladder when you first wake up in the morning.  Schedule times throughout the day when you will urinate.  Start by going to the bathroom every hour, even if you don't need to go.  Slowly increase the time between trips to the bathroom.  When you have found a schedule that works well for you, keep doing it.  If you wake up during the night and have to urinate, do it. Apply your schedule to waking hours only.  Kegel exercises  These tighten and strengthen pelvic muscles, which can help you control the flow of urine. (If doing these exercises causes pain, stop doing them and talk with your doctor.) To do Kegel exercises:  Squeeze your muscles as if you were trying not to pass gas. Or squeeze your muscles as if you were stopping the flow of urine. Your belly, legs, and buttocks shouldn't move.  Hold the squeeze for 3 seconds, then relax for 5 to 10 seconds.  Start with 3 seconds, then add 1 second each week until you are able to squeeze for 10 seconds.  Repeat the exercise 10 times a session. Do 3 to 8 sessions a day.  When should you call for help?  Watch closely for changes in your health, and be sure to contact your doctor if:    Your incontinence is getting worse.     You do not get better as expected.   Where can you learn more?  Go to https://www.[employer].net/patiented  Enter V684 in the search box to learn more about \"Bladder Training: Care Instructions.\"  Current as of: November 15, 2023               Content Version: 14.0    5021-1001 [employer].   Care instructions adapted under license by your healthcare professional. If you have questions about a medical condition or this instruction, always ask your healthcare professional. [employer] disclaims any warranty or liability for your use of this information.         "

## 2024-09-19 ENCOUNTER — E-ADVICE (OUTPATIENT)
Dept: FAMILY MEDICINE | Age: 69
End: 2024-09-19

## 2024-09-20 ENCOUNTER — OFFICE VISIT (OUTPATIENT)
Dept: PHARMACY | Facility: CLINIC | Age: 69
End: 2024-09-20
Attending: NURSE PRACTITIONER
Payer: COMMERCIAL

## 2024-09-20 DIAGNOSIS — E78.5 HYPERLIPIDEMIA LDL GOAL <130: ICD-10-CM

## 2024-09-20 DIAGNOSIS — E66.01 MORBID OBESITY (H): Primary | ICD-10-CM

## 2024-09-20 DIAGNOSIS — J30.9 ALLERGIC RHINITIS, UNSPECIFIED SEASONALITY, UNSPECIFIED TRIGGER: ICD-10-CM

## 2024-09-20 DIAGNOSIS — E03.9 HYPOTHYROIDISM, UNSPECIFIED TYPE: ICD-10-CM

## 2024-09-20 DIAGNOSIS — K21.9 GASTROESOPHAGEAL REFLUX DISEASE WITHOUT ESOPHAGITIS: ICD-10-CM

## 2024-09-20 DIAGNOSIS — Z78.9 TAKES DIETARY SUPPLEMENTS: ICD-10-CM

## 2024-09-20 PROCEDURE — 99607 MTMS BY PHARM ADDL 15 MIN: CPT | Performed by: PHARMACIST

## 2024-09-20 PROCEDURE — 99605 MTMS BY PHARM NP 15 MIN: CPT | Performed by: PHARMACIST

## 2024-09-20 RX ORDER — CALCIUM CARBONATE 500 MG/1
1 TABLET, CHEWABLE ORAL 2 TIMES DAILY PRN
COMMUNITY

## 2024-09-20 NOTE — PROGRESS NOTES
Medication Therapy Management (MTM) Encounter    ASSESSMENT:                            Medication Adherence/Access: See below for considerations    Weight Management: Patient may benefit from consideration of GLP-1 agonist. As Medicare patient without history of CAD would most likely need to pursue compounded semaglutide to keep costs down (if it remains option). Could also consider metformin (pre-diabetes), naltrexone, and topiramate potentially if appropriate.      Hyperlipidemia: Some persistent cramping issues would benefit from 2 week hold to see if this improves - at current dose would likely just pursue alternative statin if necessary.     GERD: Stable.     Hypothyroidism: Stable. Last TSH is within normal limits.    Allergies: Stable.      Supplements: Stable.      PLAN:                            HOLD SIMVASTATIN at least for the next 2 weeks to see if this helps with your cramping or muscle aches.     We are considering starting a GLP-1 (Glucagon-like Peptide-1) medication. Examples of this medication include Wegovy, Zepbound, etc. The medication is not covered by your insurance plan so using compounding pharmacy would be the most affordable option. . These medications work the same, in that they can help you lose weight and control your blood sugar. One of the ways it works is by slowing down the rate that food leaves your stomach. You feel costa and will eat less.  It also helps regulate hormones that can help improve your blood sugars and satiety. It may help your body's insulin stick around longer to minimize insulin release as ell.     Types of GLP-1 agonist medications include:    Saxenda (liraglutide): This contains the same active ingredient in Victoza, except FDA approved for weight loss specifically. Doses increase up to 3 mg daily.  Pen needles need to be ordered also.     Wegovy (semaglutide): This contains the same active ingredient in Ozempic, except FDA approved for weight loss  specifically. Doses increase up to 2.4 mg once weekly. Each pen contains one dose and needle is within pen.     Zepbound (tirzepatide): This contains the same active ingredient in Mounjaro, except FDA approved for weight loss specifically. Doses increase up to 15 mg once weekly. Each pen contains on dose and needle is within pen.     Cost: The compounded version of semaglutide is around $230 monthly for doses up to 1 mg weekly (the first 3 dose ranges) and about $370 fmonthly or doses up to 2.4 mg weekly.  There are newer programs available for Zepbound but costs are often >$500.       Side effects of GLP- Medications include: The most common side effects are mostly gastrointestinal related and consist of: nausea, constipation, diarrhea, burping, heartburn, or gassiness. Patients are advised to eat slowly and less, and nausea typically passes if people can stick it out. Other side effect can include headache.     You should not be prescribed this medication if you have a personal or family history of medullary thyroid carcinoma (MTC) or Multiple Endocrine Neoplasia syndrome type 2 (MEN, type 2). Please let us know if you have personal or family history of this specific kind of thyroid cancer.     The risk of pancreatitis (inflammation of the pancreas) has been associated with this type of medication, but is very rare.  If you have had pancreatitis in the past, this medication may not be for you. Please let us know about any past history of pancreas problems.      Symptoms of pancreatitis include: Pain in your upper stomach area which  may travel to your back and be worse after eating. Your stomach area may be tender to the touch.  You may have vomiting or nausea and/or have a fever. If you should develop any of these symptoms, stop the medication and contact your primary care doctor. They will do a blood test to check for pancreatitis.       There is a rare chance you may have some low blood sugar after taking the  "medication.   The signs of low blood sugar are:  Weakness  Shaky   Hungry  Sweating  Confusion      Alternative therapies we could consider include:    PHENTERMINE (~$20 with [employer] - this is a controlled substance): Patients on Phentermine find that they feel less hunger, find it easier to push the plate away, and have an easier time eating less For some of our patients, these feelings are very real and immediate. For other patients, the feelings are less obvious. They don't feel much of a change but find they've lost weight.     Side-effects. Phentermine is generally well tolerated. The main side-effects we see are feelings of racing pulse or rapid heart beat. Some people can get an elevated blood pressure. Because of this we may have you come back within a week or so of starting the medication for a blood pressure check     TOPIRAMATE (~$20 with [employer] - likely to avoid due to Nuvaring interaction): Topiramate may make you: feel less interest in eating in between meals, think less about food and eating, find it easier to push the plate away, find giving up pop easier, have an easier time eating less    Side-effects. Topiramate is generally well tolerated. The main side-effects we see are: Tingling in hands,feet, or face (usually not very troublesome), Mental confusion and word finding trouble (about 10% of patients have this.), Feeling sleepy or a bit dopey- this goes away very soon after starting.    NALTREXONE (~$35 with GoodRx): Just how Naltrexone helps with weight loss has not been exactly determined.  It seems to work by quieting down brain signals related to strong food cravings. Many of our patients use the word \"addiction\" to describe their feelings and constant thoughts about food. It makes sense then to treat the feeling of dependence on food, outside of real hunger, with a medication designed to help with other sorts of dependence. Our patients on Naltrexone find that they: feel less interest in food, " think less about food and eating and have more time to think of other things, find it easier to push the plate away, have an easier time eating less     Side-effects. Naltrexone is generally well tolerated. The main side-effect we see is nausea or a woozy feeling. A small number of people feel quite ill. Most people have a mild reaction and some people have no reaction at all.  The good news is that this feeling does go away. We do recheck labs in 6-8 weeks.      Weight loss medication work best when you help it work. This means:  Having less tempting high calorie (fattening) food around the house or office. (For people with strong cravings this is very important.)   Staying away from situations or people that may trigger your cravings  Eating out only one time or less each week.  Eating your meals at a table with the TV or computer off.      Follow-up: after we decide on starting a medication we can determine follow-up    SUBJECTIVE/OBJECTIVE:                          Ashli Rogers is a 69 year old female seen for an initial visit. She was referred to me from Osiris Lwe.      Reason for visit: Comprehensive medication review.    Allergies/ADRs: Reviewed in chart  Past Medical History: Reviewed in chart  Tobacco: She reports that she has never smoked. She has never used smokeless tobacco.  Alcohol: 1-3 beverages / week  Other Substance Use: sativa - marijuana - weekly  Caffeine: none    Medication Adherence/Access: no issues reported    Weight Management   Patient reports she has been working with [employer] - getting about 1800 calories per day.  Nutrition/Eating Habits: Previously to stay under 1500 calories. Meal planning is difficult - challenges with different settings and life changes.  Has some interest in GLP-1 agonist. Denies any family or personal history of thyroid/endocrine cancers. Will be going on several trips so not planning on starting anything today/tomorrow, but in the future.   Exercise/Activity: Water  aerobics three times weekly - likes it's social. Stability classes - Get excited to move gym. Feels stronger, but weight has not come off.   Medications Tried/Failed:  Fenphen (prior to children): effectively      Initial Consult Weight: 308 lbs     Wt Readings from Last 4 Encounters:   09/13/24 308 lb (139.7 kg)   09/21/23 305 lb (138.3 kg)   05/02/23 313 lb (142 kg)   07/05/22 316 lb (143.3 kg)     Estimated body mass index is 41.77 kg/m  as calculated from the following:    Height as of 9/13/24: 6' (1.829 m).    Weight as of 9/13/24: 308 lb (139.7 kg).    Hyperlipidemia   Simvastatin 10 mg daily  Patient reports cramping in her legs at night - during intercourse and she also gets during yoga on a consistent basis.   The 10-year ASCVD risk score (Wilma MOMIN, et al., 2019) is: 7.5%    Values used to calculate the score:      Age: 69 years      Sex: Female      Is Non- : No      Diabetic: No      Tobacco smoker: No      Systolic Blood Pressure: 112 mmHg      Is BP treated: No      HDL Cholesterol: 65 mg/dL      Total Cholesterol: 154 mg/dL     Recent Labs   Lab Test 09/13/24  1020 05/09/23  0839   CHOL 154 181   HDL 65 59   LDL 71 103*   TRIG 89 95     GERD    Omeprazole 20 mg daily as needed - really just proactively with acidic meals    Tums as needed  Patient feels that current regimen is effective.       Hypothyroidism   Levothyroxine 88 mcg daily.   Patient is having the following symptoms: none.      TSH   Date Value Ref Range Status   09/13/2024 2.32 0.30 - 4.20 uIU/mL Final   05/27/2022 3.27 0.40 - 4.00 mU/L Final   03/17/2021 4.90 (H) 0.40 - 4.00 mU/L Final     Allergies:   cetirizine 10 mg once daily  Patient reports no current medication side effects.     Patient feels that current therapy is effective.     Supplements   Calcium/magnesium/zinc daily  Vitamin D3 daily  No reported issues at this time.          Today's Vitals: LMP 01/19/2005     Wt Readings from Last 5 Encounters:    09/13/24 308 lb (139.7 kg)   09/21/23 305 lb (138.3 kg)   05/02/23 313 lb (142 kg)   07/05/22 316 lb (143.3 kg)   05/27/22 313 lb (142 kg)     ----------------      I spent 45 minutes with this patient today  (an extra 15 minutes was spent creating the Medication Action Plan). All changes were made via collaborative practice agreement with Osiris Lew NP. A copy of the visit note was provided to the patient's provider(s).    A summary of these recommendations was given to the patient.    Adilson Mitchell, PharmD, BCACP  Medication Therapy Management Pharmacist  Voicemail: [phone]     Medication Therapy Recommendations  Hyperlipidemia LDL goal <130    Current Medication: simvastatin (ZOCOR) 10 MG tablet   Rationale: Undesirable effect - Adverse medication event - Safety   Recommendation: Discontinue Medication   Status: Accepted per CPA

## 2024-09-20 NOTE — LETTER
September 20, 2024  Ashli Rogers  [id number] [id number]    Dear Ms. Rogers, [facility]     Thank you for talking with me on Sep 20, 2024 about your health and medications. As a follow-up to our conversation, I have included two documents:      Your Recommended To-Do List has steps you should take to get the best results from your medications.  Your Medication List will help you keep track of your medications and how to take them.    If you want to talk about these documents, please call Bautista Mitchell RPH at phone: [phone], Monday-Friday 8-4:30pm.    I look forward to working with you and your doctors to make sure your medications work well for you.    Sincerely,  Bautista Mitchell RPH  [facility] Pharmacist, [facility]

## 2024-09-20 NOTE — LETTER
_  Medication List        Prepared on: Sep 20, 2024     Bring your Medication List when you go to the doctor, hospital, or   emergency room. And, share it with your family or caregivers.     Note any changes to how you take your medications.  Cross out medications when you no longer use them.    Medication How I take it Why I use it Prescriber   calcium carbonate (TUMS) 500 MG chewable tablet Take 1 chew tab by mouth 2 times daily as needed for heartburn. Heartburn Patient Reported   CALCIUM-MAGNESIUM-ZINC PO Take 1 tablet by mouth daily. Reported on 3/28/2017 General Health Patient Reported   Cholecalciferol (VITAMIN D3 PO) Take 1,000 Units by mouth daily. General Health Patient Reported   IBUPROFEN PO Take 400 mg by mouth every 8 hours as needed. Pain Patient Reported   levothyroxine (SYNTHROID/LEVOTHROID) 88 MCG tablet Take 1 tablet (88 mcg) by mouth daily. Subclinical Hypothyroidism Osiris Lew NP   omeprazole (PRILOSEC) 20 MG DR capsule Take 1 capsule (20 mg) by mouth daily. Gastroesophageal Reflux Disease without Esophagitis; Hiatal Hernia Osiris Lew NP   simvastatin (ZOCOR) 10 MG tablet Take 1 tablet (10 mg) by mouth at bedtime. Hyperlipidemia LDL Goal <130 Osiris Lew NP   ZYRTEC 10 MG OR TABS ONE TABLET DAILY Allergic Rhinitis, Cause Unspecified Earnest Ortega PA-C         Add new medications, over-the-counter drugs, herbals, vitamins, or  minerals in the blank rows below.    Medication How I take it Why I use it Prescriber                                      Allergies:      - Cephalexin Monohydrate  - Gluten Meal - GI Disturbance  - Warfarin Sodium        Side effects I have had:      Not on File        Other Information:              My notes and questions:

## 2024-09-20 NOTE — PATIENT INSTRUCTIONS
Recommendations from today's MTM visit:                                                    MTM (medication therapy management) is a service provided by a clinical pharmacist designed to help you get the most of out of your medicines.   Today we reviewed what your medicines are for, how to know if they are working, that your medicines are safe and how to make your medicine regimen as easy as possible.      HOLD SIMVASTATIN at least for the next 2 weeks to see if this helps with your cramping or muscle aches.     We are considering starting a GLP-1 (Glucagon-like Peptide-1) medication. Examples of this medication include Wegovy, Zepbound, etc. The medication is not covered by your insurance plan so using compounding pharmacy would be the most affordable option. . These medications work the same, in that they can help you lose weight and control your blood sugar. One of the ways it works is by slowing down the rate that food leaves your stomach. You feel costa and will eat less.  It also helps regulate hormones that can help improve your blood sugars and satiety. It may help your body's insulin stick around longer to minimize insulin release as ell.     Types of GLP-1 agonist medications include:    Saxenda (liraglutide): This contains the same active ingredient in Victoza, except FDA approved for weight loss specifically. Doses increase up to 3 mg daily.  Pen needles need to be ordered also.     Wegovy (semaglutide): This contains the same active ingredient in Ozempic, except FDA approved for weight loss specifically. Doses increase up to 2.4 mg once weekly. Each pen contains one dose and needle is within pen.     Zepbound (tirzepatide): This contains the same active ingredient in Mounjaro, except FDA approved for weight loss specifically. Doses increase up to 15 mg once weekly. Each pen contains on dose and needle is within pen.     Cost: The compounded version of semaglutide is around $230 monthly for doses up to 1  mg weekly (the first 3 dose ranges) and about $370 fmonthly or doses up to 2.4 mg weekly.  There are newer programs available for Zepbound but costs are often >$500.       Side effects of GLP- Medications include: The most common side effects are mostly gastrointestinal related and consist of: nausea, constipation, diarrhea, burping, heartburn, or gassiness. Patients are advised to eat slowly and less, and nausea typically passes if people can stick it out. Other side effect can include headache.     You should not be prescribed this medication if you have a personal or family history of medullary thyroid carcinoma (MTC) or Multiple Endocrine Neoplasia syndrome type 2 (MEN, type 2). Please let us know if you have personal or family history of this specific kind of thyroid cancer.     The risk of pancreatitis (inflammation of the pancreas) has been associated with this type of medication, but is very rare.  If you have had pancreatitis in the past, this medication may not be for you. Please let us know about any past history of pancreas problems.      Symptoms of pancreatitis include: Pain in your upper stomach area which  may travel to your back and be worse after eating. Your stomach area may be tender to the touch.  You may have vomiting or nausea and/or have a fever. If you should develop any of these symptoms, stop the medication and contact your primary care doctor. They will do a blood test to check for pancreatitis.       There is a rare chance you may have some low blood sugar after taking the medication.   The signs of low blood sugar are:  Weakness  Shaky   Hungry  Sweating  Confusion      Alternative therapies we could consider include:    PHENTERMINE (~$20 with [employer] - this is a controlled substance): Patients on Phentermine find that they feel less hunger, find it easier to push the plate away, and have an easier time eating less For some of our patients, these feelings are very real and immediate. For  "other patients, the feelings are less obvious. They don't feel much of a change but find they've lost weight.     Side-effects. Phentermine is generally well tolerated. The main side-effects we see are feelings of racing pulse or rapid heart beat. Some people can get an elevated blood pressure. Because of this we may have you come back within a week or so of starting the medication for a blood pressure check     TOPIRAMATE (~$20 with GoodRx - likely to avoid due to Nuvaring interaction): Topiramate may make you: feel less interest in eating in between meals, think less about food and eating, find it easier to push the plate away, find giving up pop easier, have an easier time eating less    Side-effects. Topiramate is generally well tolerated. The main side-effects we see are: Tingling in hands,feet, or face (usually not very troublesome), Mental confusion and word finding trouble (about 10% of patients have this.), Feeling sleepy or a bit dopey- this goes away very soon after starting.    NALTREXONE (~$35 with GoodRx): Just how Naltrexone helps with weight loss has not been exactly determined.  It seems to work by quieting down brain signals related to strong food cravings. Many of our patients use the word \"addiction\" to describe their feelings and constant thoughts about food. It makes sense then to treat the feeling of dependence on food, outside of real hunger, with a medication designed to help with other sorts of dependence. Our patients on Naltrexone find that they: feel less interest in food, think less about food and eating and have more time to think of other things, find it easier to push the plate away, have an easier time eating less     Side-effects. Naltrexone is generally well tolerated. The main side-effect we see is nausea or a woozy feeling. A small number of people feel quite ill. Most people have a mild reaction and some people have no reaction at all.  The good news is that this feeling does " "go away. We do recheck labs in 6-8 weeks.      Weight loss medication work best when you help it work. This means:  Having less tempting high calorie (fattening) food around the house or office. (For people with strong cravings this is very important.)   Staying away from situations or people that may trigger your cravings  Eating out only one time or less each week.  Eating your meals at a table with the TV or computer off.      Follow-up: after we decide on starting a medication we can determine follow-up    It was great speaking with you today.  I value your experience and would be very thankful for your time in providing feedback in our clinic survey. In the next few days, you may receive an email or text message from [employer] with a link to a survey related to your  clinical pharmacist.\"     To schedule another MTM appointment, please call the clinic directly or you may call the MTM scheduling line at [phone] or toll-free at [phone].     My Clinical Pharmacist's contact information:                                                      Please feel free to contact me with any questions or concerns you have.      Adilson Mitchell, PharmD, BCACP  Medication Therapy Management Pharmacist  Voicemail: [phone]  "

## 2024-09-20 NOTE — LETTER
"Recommended To-Do List      Prepared on: Sep 20, 2024       You can get the best results from your medications by completing the items on this \"To-Do List.\"      Bring your To-Do List when you go to your doctor. And, share it with your family or caregivers.    My To-Do List:  What we talked about: What I should do:   An issue with your medication    Stop taking simvastatin (ZOCOR) for 2 weeks to see if this helps with your ongoing cramping issues.                 "

## 2024-09-24 ENCOUNTER — LAB SERVICES (OUTPATIENT)
Dept: LAB | Age: 69
End: 2024-09-24

## 2024-09-24 DIAGNOSIS — E03.9 HYPOTHYROIDISM, UNSPECIFIED TYPE: ICD-10-CM

## 2024-09-24 LAB — TSH SERPL-ACNC: 0.46 MCUNITS/ML (ref 0.35–5)

## 2024-09-24 PROCEDURE — 36415 COLL VENOUS BLD VENIPUNCTURE: CPT | Performed by: FAMILY MEDICINE

## 2024-09-24 PROCEDURE — 84443 ASSAY THYROID STIM HORMONE: CPT | Performed by: INTERNAL MEDICINE

## 2024-09-24 RX ORDER — LOSARTAN POTASSIUM 100 MG/1
100 TABLET ORAL DAILY
Qty: 90 TABLET | Refills: 3 | Status: SHIPPED | OUTPATIENT
Start: 2024-09-24

## 2024-09-27 RX ORDER — LEVOTHYROXINE SODIUM 125 UG/1
125 TABLET ORAL DAILY
Qty: 90 TABLET | Refills: 0 | Status: SHIPPED | OUTPATIENT
Start: 2024-09-27

## 2024-10-03 DIAGNOSIS — E78.5 DYSLIPIDEMIA: ICD-10-CM

## 2024-10-03 RX ORDER — FLUTICASONE PROPIONATE 50 MCG
2 SPRAY, SUSPENSION (ML) NASAL DAILY
Qty: 48 G | Refills: 2 | Status: SHIPPED | OUTPATIENT
Start: 2024-10-03

## 2024-10-03 RX ORDER — ATORVASTATIN CALCIUM 40 MG/1
40 TABLET, FILM COATED ORAL DAILY
Qty: 90 TABLET | Refills: 1 | Status: SHIPPED | OUTPATIENT
Start: 2024-10-03

## 2024-10-03 RX ORDER — CICLOPIROX 80 MG/ML
SOLUTION TOPICAL NIGHTLY
Qty: 6.6 ML | Refills: 0 | Status: SHIPPED | OUTPATIENT
Start: 2024-10-03

## 2024-10-08 ENCOUNTER — APPOINTMENT (OUTPATIENT)
Dept: MAMMOGRAPHY | Age: 69
End: 2024-10-08
Attending: FAMILY MEDICINE

## 2024-10-08 DIAGNOSIS — Z12.31 VISIT FOR SCREENING MAMMOGRAM: ICD-10-CM

## 2024-10-08 PROCEDURE — 77067 SCR MAMMO BI INCL CAD: CPT | Performed by: RADIOLOGY

## 2024-10-08 PROCEDURE — 77063 BREAST TOMOSYNTHESIS BI: CPT | Performed by: RADIOLOGY

## 2024-10-10 ENCOUNTER — E-ADVICE (OUTPATIENT)
Dept: FAMILY MEDICINE | Age: 69
End: 2024-10-10

## 2024-10-12 ENCOUNTER — HEALTH MAINTENANCE LETTER (OUTPATIENT)
Age: 69
End: 2024-10-12

## 2024-11-14 ENCOUNTER — HOSPITAL ENCOUNTER (OUTPATIENT)
Dept: MAMMOGRAPHY | Facility: CLINIC | Age: 69
Discharge: HOME | End: 2024-11-14
Attending: NURSE PRACTITIONER
Payer: COMMERCIAL

## 2024-11-14 ENCOUNTER — HOSPITAL ENCOUNTER (OUTPATIENT)
Dept: BONE DENSITY | Facility: CLINIC | Age: 69
Discharge: HOME | End: 2024-11-14
Attending: NURSE PRACTITIONER
Payer: COMMERCIAL

## 2024-11-14 DIAGNOSIS — Z12.31 VISIT FOR SCREENING MAMMOGRAM: ICD-10-CM

## 2024-11-14 DIAGNOSIS — Z78.0 ASYMPTOMATIC MENOPAUSAL STATE: ICD-10-CM

## 2024-11-14 PROCEDURE — 77080 DXA BONE DENSITY AXIAL: CPT

## 2024-11-14 PROCEDURE — 77063 BREAST TOMOSYNTHESIS BI: CPT

## 2024-11-18 ENCOUNTER — APPOINTMENT (OUTPATIENT)
Dept: PODIATRY | Age: 69
End: 2024-11-18

## 2024-11-18 DIAGNOSIS — B35.1 ONYCHOMYCOSIS: ICD-10-CM

## 2024-11-18 DIAGNOSIS — M20.41 HAMMER TOES OF BOTH FEET: Primary | ICD-10-CM

## 2024-11-18 DIAGNOSIS — M21.6X2 ACQUIRED EQUINUS DEFORMITY OF BOTH FEET: ICD-10-CM

## 2024-11-18 DIAGNOSIS — M20.42 HAMMER TOES OF BOTH FEET: Primary | ICD-10-CM

## 2024-11-18 DIAGNOSIS — M21.6X1 ACQUIRED EQUINUS DEFORMITY OF BOTH FEET: ICD-10-CM

## 2024-11-18 DIAGNOSIS — L60.0 INGROWN NAIL: ICD-10-CM

## 2024-11-18 RX ORDER — CICLOPIROX 80 MG/ML
SOLUTION TOPICAL NIGHTLY
Qty: 6.6 ML | Refills: 3 | Status: SHIPPED | OUTPATIENT
Start: 2024-11-18

## 2024-11-21 ASSESSMENT — ENCOUNTER SYMPTOMS
COLOR CHANGE: 1
ACTIVITY CHANGE: 1

## 2024-11-25 ENCOUNTER — OFFICE VISIT (OUTPATIENT)
Dept: FAMILY MEDICINE | Facility: CLINIC | Age: 69
End: 2024-11-25
Payer: COMMERCIAL

## 2024-11-25 VITALS
DIASTOLIC BLOOD PRESSURE: 74 MMHG | RESPIRATION RATE: 18 BRPM | WEIGHT: 293 LBS | TEMPERATURE: 97.2 F | SYSTOLIC BLOOD PRESSURE: 118 MMHG | BODY MASS INDEX: 39.68 KG/M2 | HEART RATE: 65 BPM | OXYGEN SATURATION: 97 % | HEIGHT: 72 IN

## 2024-11-25 DIAGNOSIS — R73.03 PREDIABETES: Primary | ICD-10-CM

## 2024-11-25 DIAGNOSIS — E66.01 MORBID OBESITY (H): ICD-10-CM

## 2024-11-25 PROCEDURE — 99214 OFFICE O/P EST MOD 30 MIN: CPT | Performed by: FAMILY MEDICINE

## 2024-11-25 RX ORDER — PHENTERMINE HYDROCHLORIDE 30 MG/1
30 CAPSULE ORAL EVERY MORNING
Qty: 30 CAPSULE | Refills: 5 | Status: SHIPPED | OUTPATIENT
Start: 2024-11-25

## 2024-11-25 RX ORDER — METFORMIN HYDROCHLORIDE 500 MG/1
500 TABLET, EXTENDED RELEASE ORAL
Qty: 90 TABLET | Refills: 1 | Status: SHIPPED | OUTPATIENT
Start: 2024-11-25

## 2024-11-25 ASSESSMENT — PAIN SCALES - GENERAL: PAINLEVEL_OUTOF10: NO PAIN (0)

## 2024-11-25 NOTE — PROGRESS NOTES
Assessment & Plan       ICD-10-CM    1. Prediabetes  R73.03 metFORMIN (GLUCOPHAGE XR) 500 MG 24 hr tablet      2. Morbid obesity (H)  E66.01 metFORMIN (GLUCOPHAGE XR) 500 MG 24 hr tablet     phentermine 30 MG capsule           We talked about medications for weight loss and she may benefit from medication for glucose control with her prediabetes.  At this time I recommend a combination of metformin and Phentermine. She will continue using Noom as well. The metformin may help reverse metabolic changes related to prediabetes and metabolic syndrome and the Phentermine may help reinforce health eating habits.     Would like her to have a follow up visit in 1-2 months with new provider. She plans to transition to [facility] after my departure.     We discussed potential side effects of both medications. Will follow up sooner prn any concerns.       Zelda Garcia MD     Subjective   Ashli is a 69 year old, presenting for the following health issues:  Follow Up        11/25/2024     7:42 AM   Additional Questions   Roomed by Jaclyn SALINAS     History of Present Illness       Diabetes:   She presents for follow up of diabetes.    She is not checking blood glucose.        She is concerned about other.   She is having numbness in feet.            Reason for visit:  Get Dr. Garcia's opinion about weight loss, and other information, and say goodbye.    She eats 2-3 servings of fruits and vegetables daily.She consumes 0 sweetened beverage(s) daily.She exercises with enough effort to increase her heart rate 30 to 60 minutes per day.  She exercises with enough effort to increase her heart rate 4 days per week.   She is taking medications regularly.     Ashli is here to follow up on her weight and her labs from September. She is prediabetic and has been working on weight loss. She has been using Noom since May 2023 and hasn't seen much benefit in overall weight loss, although she has learned healthier eating habits. She  "has lost 7 lbs since then.       Component      Latest Ref Rng 9/13/2024  10:20 AM   Sodium      135 - 145 mmol/L 142    Potassium      3.4 - 5.3 mmol/L 4.9    Chloride      98 - 107 mmol/L 108 (H)    Carbon Dioxide (CO2)      22 - 29 mmol/L 24    Anion Gap      7 - 15 mmol/L 10    Urea Nitrogen      8.0 - 23.0 mg/dL 16.5    Creatinine      0.51 - 0.95 mg/dL 0.80    GFR Estimate      >60 mL/min/1.73m2 78    Calcium      8.8 - 10.4 mg/dL 8.9    Glucose      70 - 99 mg/dL 106 (H)    Patient Fasting? Yes    Patient Fasting? Yes    Cholesterol      <200 mg/dL 154    Triglycerides      <150 mg/dL 89    HDL Cholesterol      >=50 mg/dL 65    LDL Cholesterol Calculated      <100 mg/dL 71    Non HDL Cholesterol      <130 mg/dL 89    TSH      0.30 - 4.20 uIU/mL 2.32    Hemoglobin A1C      <5.7 % 5.8 (H)           7 pt ROS is otherwise negative except as noted in HPI.        Objective    /74   Pulse 65   Temp 97.2  F (36.2  C) (Temporal)   Resp 18   Ht 1.829 m (6' 0.01\")   Wt 138.8 kg (306 lb)   LMP 01/19/2005   SpO2 97%   BMI 41.49 kg/m    Body mass index is 41.49 kg/m .  Physical Exam   Vitals noted.  Patient alert, oriented, and in no acute distress.   Neck:  Supple without lymphadenopathy, JVD or masses.   CV:  RRR without murmur.   Respiratory:  Lungs clear to auscultation bilaterally.       See prior lab results above.         Signed Electronically by: Zelda Garcia MD    "

## 2024-12-05 RX ORDER — CLOBETASOL PROPIONATE 0.5 MG/G
OINTMENT TOPICAL 2 TIMES DAILY
Qty: 30 G | Refills: 0 | Status: SHIPPED | OUTPATIENT
Start: 2024-12-05

## 2024-12-05 NOTE — PATIENT INSTRUCTIONS
Ashli, sorry for the delay in getting these notes to you.   I have ordered 2 medications for you: Metformin, and Phentermine.     The Phentermine is the stimulant type medication. It should help curb your appetite and reduce your snacking.   It can raise your heart rate and blood pressure so we need to monitor for those side effects.   You'll take it once daily. I am giving you the medium dose. We could go up or down if needed.     The other medication Is metformin. This is also a once daily medication. This makes your insulin work better and helps you break down your food more efficiently, making better use of the energy in your food.   This has low risk of side effects. In some people it can cause GI upset such as abdominal cramps and diarrhea, but at the low dose I am putting you on I think that risk is small.   This is often used for diabetes and may help reduce your glucose and A1c readings in time.     The combination should work well to help you lose weight and stay healthy.     There are other meds that can be used for weight loss. These are usually injectable and some oral medications such as semaglutide (Ozempic/Rybelsus/Wegovy), tirzepatide (Mounjaro, Zepbound). These might be considered if the metformin and phentermine don't work out.   These medications are often much more expensive and in many cases are not covered by insurance. But if this is something we want to pursue we can look at options down the road.     Please schedule a follow up visit in 1-2 months, I'll ask my team to help you schedule.

## 2024-12-23 RX ORDER — LEVOTHYROXINE SODIUM 125 UG/1
125 TABLET ORAL DAILY
Qty: 90 TABLET | Refills: 0 | Status: SHIPPED | OUTPATIENT
Start: 2024-12-23

## 2024-12-23 RX ORDER — LOSARTAN POTASSIUM 100 MG/1
100 TABLET ORAL DAILY
Qty: 90 TABLET | Refills: 3 | OUTPATIENT
Start: 2024-12-23

## 2024-12-31 ASSESSMENT — ENCOUNTER SYMPTOMS
NERVOUS/ANXIOUS: 0
VOMITING: 0
WHEEZING: 0
APPETITE CHANGE: 0
ABDOMINAL PAIN: 0
FEVER: 0
FACIAL SWELLING: 0
SLEEP DISTURBANCE: 0
SORE THROAT: 0
HEADACHES: 0
DIARRHEA: 0
CHEST TIGHTNESS: 0
ADENOPATHY: 0

## 2025-01-06 ENCOUNTER — E-ADVICE (OUTPATIENT)
Dept: ALLERGY | Age: 70
End: 2025-01-06

## 2025-01-06 ENCOUNTER — E-ADVICE (OUTPATIENT)
Dept: FAMILY MEDICINE | Age: 70
End: 2025-01-06

## 2025-01-06 ENCOUNTER — APPOINTMENT (OUTPATIENT)
Dept: ALLERGY | Age: 70
End: 2025-01-06

## 2025-01-06 VITALS
HEART RATE: 82 BPM | TEMPERATURE: 97.6 F | OXYGEN SATURATION: 97 % | SYSTOLIC BLOOD PRESSURE: 156 MMHG | WEIGHT: 210.8 LBS | BODY MASS INDEX: 33.88 KG/M2 | DIASTOLIC BLOOD PRESSURE: 82 MMHG | HEIGHT: 66 IN

## 2025-01-06 DIAGNOSIS — Z78.0 POST-MENOPAUSAL: Primary | ICD-10-CM

## 2025-01-06 DIAGNOSIS — J31.0 MIXED RHINITIS: ICD-10-CM

## 2025-01-06 DIAGNOSIS — J30.9 ALLERGIC RHINOCONJUNCTIVITIS: ICD-10-CM

## 2025-01-06 DIAGNOSIS — J45.30 MILD PERSISTENT ALLERGIC ASTHMA (CMD): Primary | ICD-10-CM

## 2025-01-06 DIAGNOSIS — H10.10 ALLERGIC RHINOCONJUNCTIVITIS: ICD-10-CM

## 2025-01-06 DIAGNOSIS — K21.9 GASTROESOPHAGEAL REFLUX DISEASE, UNSPECIFIED WHETHER ESOPHAGITIS PRESENT: ICD-10-CM

## 2025-01-08 ENCOUNTER — APPOINTMENT (OUTPATIENT)
Dept: BONE DENSITY | Age: 70
End: 2025-01-08
Attending: FAMILY MEDICINE

## 2025-01-17 ENCOUNTER — APPOINTMENT (OUTPATIENT)
Dept: BONE DENSITY | Age: 70
End: 2025-01-17
Attending: FAMILY MEDICINE

## 2025-01-17 DIAGNOSIS — Z78.0 POST-MENOPAUSAL: ICD-10-CM

## 2025-01-17 LAB
DEXA LT FEMNECK TSCORE: -0.5
DEXA LT FEMNECK ZSCORE: 1.4
DEXA LT FOREARM T-SCORE: -0.3
DEXA LT FOREARM Z-SCORE: 1.9
DEXA LT TOTFEM TSCORE: 0.2
DEXA SPINE L1-L4 T-SCORE: -0.8
DEXA SPINE L1-L4 Z-SCORE: 1.4

## 2025-01-17 PROCEDURE — 77080 DXA BONE DENSITY AXIAL: CPT | Performed by: RADIOLOGY

## 2025-01-24 DIAGNOSIS — F41.1 GAD (GENERALIZED ANXIETY DISORDER): ICD-10-CM

## 2025-01-24 RX ORDER — BUSPIRONE HYDROCHLORIDE 7.5 MG/1
7.5 TABLET ORAL DAILY
Qty: 90 TABLET | Refills: 3 | Status: SHIPPED | OUTPATIENT
Start: 2025-01-24

## 2025-02-02 ENCOUNTER — E-ADVICE (OUTPATIENT)
Dept: FAMILY MEDICINE | Age: 70
End: 2025-02-02

## 2025-02-02 DIAGNOSIS — E03.9 HYPOTHYROIDISM, UNSPECIFIED TYPE: Primary | ICD-10-CM

## 2025-02-04 ENCOUNTER — LAB SERVICES (OUTPATIENT)
Dept: LAB | Age: 70
End: 2025-02-04

## 2025-02-04 DIAGNOSIS — E03.9 HYPOTHYROIDISM, UNSPECIFIED TYPE: ICD-10-CM

## 2025-02-04 LAB
T3FREE SERPL-MCNC: 2.7 PG/ML (ref 2.2–4)
T4 FREE SERPL-MCNC: 1.3 NG/DL (ref 0.8–1.5)
TSH SERPL-ACNC: 0.56 MCUNITS/ML (ref 0.35–5)

## 2025-02-04 PROCEDURE — 36415 COLL VENOUS BLD VENIPUNCTURE: CPT | Performed by: FAMILY MEDICINE

## 2025-02-04 PROCEDURE — 84439 ASSAY OF FREE THYROXINE: CPT | Performed by: INTERNAL MEDICINE

## 2025-02-04 PROCEDURE — 84481 FREE ASSAY (FT-3): CPT | Performed by: INTERNAL MEDICINE

## 2025-02-04 PROCEDURE — 84443 ASSAY THYROID STIM HORMONE: CPT | Performed by: INTERNAL MEDICINE

## 2025-02-06 RX ORDER — VERAPAMIL HYDROCHLORIDE 180 MG/1
180 CAPSULE, DELAYED RELEASE ORAL NIGHTLY
Qty: 90 CAPSULE | Refills: 1 | Status: SHIPPED | OUTPATIENT
Start: 2025-02-06

## 2025-03-22 ENCOUNTER — HEALTH MAINTENANCE LETTER (OUTPATIENT)
Age: 70
End: 2025-03-22

## 2025-03-22 DIAGNOSIS — J45.30 MILD PERSISTENT ALLERGIC ASTHMA (CMD): ICD-10-CM

## 2025-03-22 DIAGNOSIS — J31.0 MIXED RHINITIS: ICD-10-CM

## 2025-03-24 RX ORDER — MONTELUKAST SODIUM 10 MG/1
10 TABLET ORAL NIGHTLY
Qty: 90 TABLET | Refills: 3 | Status: SHIPPED | OUTPATIENT
Start: 2025-03-24

## 2025-03-24 RX ORDER — LEVOTHYROXINE SODIUM 125 UG/1
125 TABLET ORAL DAILY
Qty: 90 TABLET | Refills: 0 | Status: SHIPPED | OUTPATIENT
Start: 2025-03-24

## 2025-03-28 DIAGNOSIS — E78.5 DYSLIPIDEMIA: ICD-10-CM

## 2025-03-28 RX ORDER — ATORVASTATIN CALCIUM 40 MG/1
40 TABLET, FILM COATED ORAL DAILY
Qty: 90 TABLET | Refills: 1 | Status: SHIPPED | OUTPATIENT
Start: 2025-03-28

## 2025-04-08 ENCOUNTER — OFFICE VISIT (OUTPATIENT)
Dept: FAMILY MEDICINE | Facility: CLINIC | Age: 70
End: 2025-04-08
Payer: COMMERCIAL

## 2025-04-08 VITALS
HEART RATE: 73 BPM | TEMPERATURE: 97.4 F | OXYGEN SATURATION: 98 % | SYSTOLIC BLOOD PRESSURE: 126 MMHG | DIASTOLIC BLOOD PRESSURE: 80 MMHG | BODY MASS INDEX: 41.02 KG/M2 | WEIGHT: 293 LBS | HEIGHT: 71 IN

## 2025-04-08 DIAGNOSIS — R73.03 PREDIABETES: ICD-10-CM

## 2025-04-08 DIAGNOSIS — E66.01 MORBID OBESITY (H): Primary | ICD-10-CM

## 2025-04-08 PROCEDURE — G2211 COMPLEX E/M VISIT ADD ON: HCPCS | Performed by: NURSE PRACTITIONER

## 2025-04-08 PROCEDURE — 3074F SYST BP LT 130 MM HG: CPT | Performed by: NURSE PRACTITIONER

## 2025-04-08 PROCEDURE — 99213 OFFICE O/P EST LOW 20 MIN: CPT | Performed by: NURSE PRACTITIONER

## 2025-04-08 PROCEDURE — 1126F AMNT PAIN NOTED NONE PRSNT: CPT | Performed by: NURSE PRACTITIONER

## 2025-04-08 PROCEDURE — 3079F DIAST BP 80-89 MM HG: CPT | Performed by: NURSE PRACTITIONER

## 2025-04-08 ASSESSMENT — PAIN SCALES - GENERAL: PAINLEVEL_OUTOF10: NO PAIN (0)

## 2025-04-08 NOTE — PROGRESS NOTES
"  Assessment & Plan     Morbid obesity (H)  Just finished six months of phentermine from prior PCP.  Has lost 15lbs.  She has noticed it affects her sleep.  She is making some lifestyle and dietary changes.  Goes to the gym three days a week with aerobics then aerobics class.  Stopped drinking wine at dinner.  We did discuss other weight loss medications.  Unable to tolerate 500mg metformin as it made her very constipated.  She stopped that 6 weeks ago.  Discussed wellbutrin as she also has a history of ADHD.  She is going to consider and get back to me.      Prediabetes  Did not tolerate metformin.  Last Hgb A1C was 5.8.            BMI  Estimated body mass index is 40.87 kg/m  as calculated from the following:    Height as of this encounter: 1.803 m (5' 11\").    Weight as of this encounter: 132.9 kg (293 lb).   Weight management plan: Discussed healthy diet and exercise guidelines    The longitudinal plan of care for the diagnosis(es)/condition(s) as documented were addressed during this visit. Due to the added complexity in care, I will continue to support Ashli in the subsequent management and with ongoing continuity of care.     Subjective   Ashli is a 70 year old, presenting for the following health issues:  Weight Problem and Hypertension      4/8/2025    11:33 AM   Additional Questions   Roomed by Pilar MIXON     History of Present Illness       Diabetes:   She presents for follow up of diabetes.    She is not checking blood glucose.         She has no concerns regarding her diabetes at this time.  She is having redness, sores, or blisters on feet.            Hypothyroidism:     Since last visit, patient describes the following symptoms::  Constipation and Weight loss    Weight loss::  6-10 lbs.    Reason for visit:  Follow up    She eats 2-3 servings of fruits and vegetables daily.She consumes 0 sweetened beverage(s) daily.She exercises with enough effort to increase her heart rate 60 or more minutes per day.  She " "exercises with enough effort to increase her heart rate 3 or less days per week.   She is taking medications regularly.      Metformin caused constipation.  Phentermine caused night wakenings.      Has picked up exercise class every 3 days a week.  Then does water aerobics.  Down 15lbs in last year.  Quit class wine at dinner.  Doing healthier eating options.      Medication Followup of metformin and phentermine  Taking Medication as prescribed: NO  Side Effects:  constipation and bowel movement concerns and not  sleeping/getting up at night time  Medication Helping Symptoms:          Review of Systems  Constitutional, HEENT, cardiovascular, pulmonary, GI, , musculoskeletal, neuro, skin, endocrine and psych systems are negative, except as otherwise noted.      Objective    Pulse 73   Temp 97.4  F (36.3  C) (Temporal)   Ht 1.803 m (5' 11\")   Wt 132.9 kg (293 lb)   LMP 01/19/2005   SpO2 98%   BMI 40.87 kg/m    Body mass index is 40.87 kg/m .  Physical Exam   GENERAL: alert and no distress  EYES: Eyes grossly normal to inspection, PERRL and conjunctivae and sclerae normal  HENT: ear canals and TM's normal, nose and mouth without ulcers or lesions  NECK: no adenopathy, no asymmetry, masses, or scars  RESP: lungs clear to auscultation - no rales, rhonchi or wheezes  CV: regular rate and rhythm, normal S1 S2, no S3 or S4, no murmur, click or rub, no peripheral edema  ABDOMEN: soft, nontender, no hepatosplenomegaly, no masses and bowel sounds normal  MS: no gross musculoskeletal defects noted, no edema  SKIN: no suspicious lesions or rashes  NEURO: Normal strength and tone, mentation intact and speech normal  PSYCH: mentation appears normal, affect normal/bright    No results found for this or any previous visit (from the past 24 hours).        Signed Electronically by: Osiris Lew NP    "

## 2025-04-16 ENCOUNTER — TELEPHONE (OUTPATIENT)
Dept: PODIATRY | Age: 70
End: 2025-04-16

## 2025-04-23 ENCOUNTER — APPOINTMENT (OUTPATIENT)
Dept: OPTOMETRY | Age: 70
End: 2025-04-23

## 2025-04-23 ENCOUNTER — OFFICE VISIT (OUTPATIENT)
Dept: OPTOMETRY | Age: 70
End: 2025-04-23

## 2025-04-23 DIAGNOSIS — H40.003 PREGLAUCOMA, BILATERAL: Primary | ICD-10-CM

## 2025-04-23 DIAGNOSIS — H04.123 TEAR FILM INSUFFICIENCY, BILATERAL: ICD-10-CM

## 2025-04-23 DIAGNOSIS — H25.13 SENILE NUCLEAR SCLEROSIS, BILATERAL: ICD-10-CM

## 2025-04-23 PROBLEM — H25.12 NUCLEAR SCLEROSIS OF LEFT EYE: Status: ACTIVE | Noted: 2025-04-23

## 2025-04-23 ASSESSMENT — VISUAL ACUITY
OS_CC: 20/20
METHOD: SNELLEN - LINEAR
METHOD_MR: PATIENT COMFORTABLE WITH THEIR CURRENT VISION DECLINED REFRACTION
OD_CC+: -1
OS_CC: JAEGER 1+
OD_CC: 20/20
OD_CC: JAEGER 1+
OS_CC+: -2

## 2025-04-23 ASSESSMENT — CONF VISUAL FIELD
OS_INFERIOR_TEMPORAL_RESTRICTION: 0
OD_SUPERIOR_NASAL_RESTRICTION: 0
OS_INFERIOR_NASAL_RESTRICTION: 0
OS_SUPERIOR_NASAL_RESTRICTION: 0
OS_SUPERIOR_TEMPORAL_RESTRICTION: 0
OS_NORMAL: 1
OD_INFERIOR_TEMPORAL_RESTRICTION: 0
OD_SUPERIOR_TEMPORAL_RESTRICTION: 0
OD_INFERIOR_NASAL_RESTRICTION: 0
OD_NORMAL: 1

## 2025-04-23 ASSESSMENT — TONOMETRY
OD_IOP_MMHG: 13
IOP_METHOD: APPLANATION
OS_IOP_MMHG: 13

## 2025-04-23 ASSESSMENT — SLIT LAMP EXAM - LIDS
COMMENTS: DMC
COMMENTS: DMC

## 2025-04-23 ASSESSMENT — PACHYMETRY
OD_CT(UM): 564
OS_CT(UM): 561

## 2025-04-23 ASSESSMENT — EXTERNAL EXAM - LEFT EYE: OS_EXAM: NORMAL

## 2025-04-23 ASSESSMENT — EXTERNAL EXAM - RIGHT EYE: OD_EXAM: NORMAL

## 2025-04-23 ASSESSMENT — REFRACTION_WEARINGRX
OD_AXIS: 150
OS_SPHERE: -3.25
OD_CYLINDER: +1.00
OD_SPHERE: -2.75
OD_ADD: +2.25
SPECS_TYPE: PROGRESSIVE
OS_AXIS: 075
OS_ADD: +2.25
OS_CYLINDER: +0.75

## 2025-04-25 ENCOUNTER — APPOINTMENT (OUTPATIENT)
Dept: CARDIOLOGY | Age: 70
End: 2025-04-25

## 2025-04-25 ENCOUNTER — RESULTS FOLLOW-UP (OUTPATIENT)
Dept: CARDIOLOGY | Age: 70
End: 2025-04-25

## 2025-04-25 VITALS
OXYGEN SATURATION: 97 % | SYSTOLIC BLOOD PRESSURE: 148 MMHG | WEIGHT: 209 LBS | RESPIRATION RATE: 18 BRPM | HEART RATE: 79 BPM | DIASTOLIC BLOOD PRESSURE: 82 MMHG | HEIGHT: 66 IN | BODY MASS INDEX: 33.59 KG/M2

## 2025-04-25 DIAGNOSIS — I25.10 CORONARY ARTERY DISEASE INVOLVING NATIVE CORONARY ARTERY OF NATIVE HEART WITHOUT ANGINA PECTORIS: ICD-10-CM

## 2025-04-25 DIAGNOSIS — I10 ESSENTIAL HYPERTENSION: Primary | ICD-10-CM

## 2025-04-25 RX ORDER — VERAPAMIL HYDROCHLORIDE 240 MG/1
240 CAPSULE, DELAYED RELEASE ORAL NIGHTLY
Qty: 90 CAPSULE | Refills: 3 | Status: SHIPPED | OUTPATIENT
Start: 2025-04-25

## 2025-05-01 ENCOUNTER — TELEPHONE (OUTPATIENT)
Dept: CARDIOLOGY | Age: 70
End: 2025-05-01

## 2025-05-02 ENCOUNTER — RESULTS FOLLOW-UP (OUTPATIENT)
Dept: FAMILY MEDICINE | Age: 70
End: 2025-05-02

## 2025-05-02 ENCOUNTER — APPOINTMENT (OUTPATIENT)
Dept: CARDIOLOGY | Age: 70
End: 2025-05-02

## 2025-05-02 ENCOUNTER — LAB SERVICES (OUTPATIENT)
Dept: LAB | Age: 70
End: 2025-05-02

## 2025-05-02 VITALS — SYSTOLIC BLOOD PRESSURE: 140 MMHG | DIASTOLIC BLOOD PRESSURE: 80 MMHG

## 2025-05-02 DIAGNOSIS — I25.10 CORONARY ARTERY DISEASE INVOLVING NATIVE CORONARY ARTERY OF NATIVE HEART WITHOUT ANGINA PECTORIS: ICD-10-CM

## 2025-05-02 DIAGNOSIS — I10 ESSENTIAL HYPERTENSION: Primary | ICD-10-CM

## 2025-05-02 LAB
ALBUMIN SERPL-MCNC: 4.1 G/DL (ref 3.4–5)
ALBUMIN/GLOB SERPL: 1.1 {RATIO} (ref 1–2.4)
ALP SERPL-CCNC: 89 UNITS/L (ref 45–117)
ALT SERPL-CCNC: 41 UNITS/L
ANION GAP SERPL CALC-SCNC: 13 MMOL/L (ref 7–19)
AST SERPL-CCNC: 26 UNITS/L
BASOPHILS # BLD: 0 K/MCL (ref 0–0.3)
BASOPHILS NFR BLD: 0 %
BILIRUB SERPL-MCNC: 1 MG/DL (ref 0.2–1)
BUN SERPL-MCNC: 18 MG/DL (ref 6–20)
BUN/CREAT SERPL: 22 (ref 7–25)
CALCIUM SERPL-MCNC: 10.1 MG/DL (ref 8.4–10.2)
CHLORIDE SERPL-SCNC: 106 MMOL/L (ref 97–110)
CHOLEST SERPL-MCNC: 158 MG/DL
CHOLEST/HDLC SERPL: 2.4 {RATIO}
CO2 SERPL-SCNC: 29 MMOL/L (ref 21–32)
CREAT SERPL-MCNC: 0.81 MG/DL (ref 0.51–0.95)
DEPRECATED RDW RBC: 47.2 FL (ref 39–50)
EGFRCR SERPLBLD CKD-EPI 2021: 77 ML/MIN/{1.73_M2}
EOSINOPHIL # BLD: 0.1 K/MCL (ref 0–0.5)
EOSINOPHIL NFR BLD: 2 %
ERYTHROCYTE [DISTWIDTH] IN BLOOD: 13.2 % (ref 11–15)
FASTING DURATION TIME PATIENT: ABNORMAL H
GLOBULIN SER-MCNC: 3.7 G/DL (ref 2–4)
GLUCOSE SERPL-MCNC: 106 MG/DL (ref 70–99)
HCT VFR BLD CALC: 39.6 % (ref 36–46.5)
HDLC SERPL-MCNC: 65 MG/DL
HGB BLD-MCNC: 12.7 G/DL (ref 12–15.5)
IMM GRANULOCYTES # BLD AUTO: 0 K/MCL (ref 0–0.2)
IMM GRANULOCYTES # BLD: 0 %
LDLC SERPL CALC-MCNC: 81 MG/DL
LYMPHOCYTES # BLD: 1.7 K/MCL (ref 1–4)
LYMPHOCYTES NFR BLD: 23 %
MCH RBC QN AUTO: 31.1 PG (ref 26–34)
MCHC RBC AUTO-ENTMCNC: 32.1 G/DL (ref 32–36.5)
MCV RBC AUTO: 96.8 FL (ref 78–100)
MONOCYTES # BLD: 0.8 K/MCL (ref 0.3–0.9)
MONOCYTES NFR BLD: 11 %
NEUTROPHILS # BLD: 4.8 K/MCL (ref 1.8–7.7)
NEUTROPHILS NFR BLD: 64 %
NONHDLC SERPL-MCNC: 93 MG/DL
PLATELET # BLD AUTO: 304 K/MCL (ref 140–450)
POTASSIUM SERPL-SCNC: 5 MMOL/L (ref 3.4–5.1)
PROT SERPL-MCNC: 7.8 G/DL (ref 6.4–8.2)
RBC # BLD: 4.09 MIL/MCL (ref 4–5.2)
SODIUM SERPL-SCNC: 143 MMOL/L (ref 135–145)
TRIGL SERPL-MCNC: 58 MG/DL
WBC # BLD: 7.4 K/MCL (ref 4.2–11)

## 2025-05-02 PROCEDURE — 36415 COLL VENOUS BLD VENIPUNCTURE: CPT | Performed by: INTERNAL MEDICINE

## 2025-05-02 PROCEDURE — 80061 LIPID PANEL: CPT | Performed by: INTERNAL MEDICINE

## 2025-05-02 PROCEDURE — 85025 COMPLETE CBC W/AUTO DIFF WBC: CPT | Performed by: INTERNAL MEDICINE

## 2025-05-02 PROCEDURE — 80053 COMPREHEN METABOLIC PANEL: CPT | Performed by: INTERNAL MEDICINE

## 2025-05-19 ENCOUNTER — APPOINTMENT (OUTPATIENT)
Dept: PODIATRY | Age: 70
End: 2025-05-19

## 2025-05-27 RX ORDER — OMEPRAZOLE 40 MG/1
40 CAPSULE, DELAYED RELEASE ORAL DAILY
Qty: 90 CAPSULE | Refills: 1 | Status: SHIPPED | OUTPATIENT
Start: 2025-05-27

## 2025-06-03 ENCOUNTER — WALK IN (OUTPATIENT)
Dept: URGENT CARE | Age: 70
End: 2025-06-03

## 2025-06-03 VITALS
OXYGEN SATURATION: 97 % | TEMPERATURE: 98.1 F | SYSTOLIC BLOOD PRESSURE: 146 MMHG | HEART RATE: 78 BPM | DIASTOLIC BLOOD PRESSURE: 74 MMHG | RESPIRATION RATE: 18 BRPM

## 2025-06-03 DIAGNOSIS — L03.115 CELLULITIS OF RIGHT LEG: Primary | ICD-10-CM

## 2025-06-03 PROCEDURE — 99214 OFFICE O/P EST MOD 30 MIN: CPT | Performed by: FAMILY MEDICINE

## 2025-06-03 RX ORDER — CEPHALEXIN 500 MG/1
500 CAPSULE ORAL 3 TIMES DAILY
Qty: 21 CAPSULE | Refills: 0 | Status: SHIPPED | OUTPATIENT
Start: 2025-06-03 | End: 2025-06-10

## 2025-06-07 ENCOUNTER — WALK IN (OUTPATIENT)
Dept: URGENT CARE | Age: 70
End: 2025-06-07

## 2025-06-07 VITALS
HEART RATE: 63 BPM | OXYGEN SATURATION: 96 % | SYSTOLIC BLOOD PRESSURE: 130 MMHG | RESPIRATION RATE: 16 BRPM | DIASTOLIC BLOOD PRESSURE: 78 MMHG | TEMPERATURE: 98.4 F

## 2025-06-07 DIAGNOSIS — L03.115 CELLULITIS OF RIGHT LEG: Primary | ICD-10-CM

## 2025-06-07 ASSESSMENT — ENCOUNTER SYMPTOMS
NAUSEA: 0
HEADACHES: 0
CHILLS: 0
FEVER: 0

## 2025-06-20 RX ORDER — LEVOTHYROXINE SODIUM 125 UG/1
125 TABLET ORAL DAILY
Qty: 90 TABLET | Refills: 0 | Status: SHIPPED | OUTPATIENT
Start: 2025-06-20

## 2025-07-07 ENCOUNTER — APPOINTMENT (OUTPATIENT)
Dept: CARDIOLOGY | Age: 70
End: 2025-07-07

## 2025-07-07 VITALS
HEIGHT: 66 IN | OXYGEN SATURATION: 98 % | DIASTOLIC BLOOD PRESSURE: 70 MMHG | SYSTOLIC BLOOD PRESSURE: 132 MMHG | BODY MASS INDEX: 33.14 KG/M2 | HEART RATE: 66 BPM | RESPIRATION RATE: 17 BRPM | WEIGHT: 206.2 LBS

## 2025-07-07 DIAGNOSIS — I25.10 CORONARY ARTERY DISEASE INVOLVING NATIVE CORONARY ARTERY OF NATIVE HEART WITHOUT ANGINA PECTORIS: ICD-10-CM

## 2025-07-07 DIAGNOSIS — I10 ESSENTIAL HYPERTENSION: Primary | ICD-10-CM

## 2025-07-07 DIAGNOSIS — E78.5 DYSLIPIDEMIA: ICD-10-CM

## 2025-07-07 DIAGNOSIS — E66.9 OBESITY (BMI 30-39.9): ICD-10-CM

## 2025-07-07 PROCEDURE — 99214 OFFICE O/P EST MOD 30 MIN: CPT | Performed by: INTERNAL MEDICINE

## 2025-07-07 ASSESSMENT — PATIENT HEALTH QUESTIONNAIRE - PHQ9
1. LITTLE INTEREST OR PLEASURE IN DOING THINGS: NOT AT ALL
CLINICAL INTERPRETATION OF PHQ2 SCORE: NO FURTHER SCREENING NEEDED
SUM OF ALL RESPONSES TO PHQ9 QUESTIONS 1 AND 2: 0
SUM OF ALL RESPONSES TO PHQ9 QUESTIONS 1 AND 2: 0
2. FEELING DOWN, DEPRESSED OR HOPELESS: NOT AT ALL

## 2025-07-28 SDOH — ECONOMIC STABILITY: FOOD INSECURITY: WITHIN THE PAST 12 MONTHS, THE FOOD YOU BOUGHT JUST DIDN'T LAST AND YOU DIDN'T HAVE MONEY TO GET MORE.: NEVER TRUE

## 2025-07-28 SDOH — ECONOMIC STABILITY: HOUSING INSECURITY: DO YOU HAVE PROBLEMS WITH ANY OF THE FOLLOWING?: NONE OF THE ABOVE

## 2025-07-28 SDOH — ECONOMIC STABILITY: HOUSING INSECURITY: WHAT IS YOUR LIVING SITUATION TODAY?: I HAVE A STEADY PLACE TO LIVE

## 2025-07-28 ASSESSMENT — PATIENT HEALTH QUESTIONNAIRE - PHQ9
CLINICAL INTERPRETATION OF PHQ2 SCORE: 0
CLINICAL INTERPRETATION OF PHQ2 SCORE: NO FURTHER SCREENING NEEDED
SUM OF ALL RESPONSES TO PHQ9 QUESTIONS 1 AND 2: 0
2. FEELING DOWN, DEPRESSED OR HOPELESS: NOT AT ALL
1. LITTLE INTEREST OR PLEASURE IN DOING THINGS: NOT AT ALL

## 2025-07-28 ASSESSMENT — SOCIAL DETERMINANTS OF HEALTH (SDOH): IN THE PAST 12 MONTHS, HAS THE ELECTRIC, GAS, OIL, OR WATER COMPANY THREATENED TO SHUT OFF SERVICE IN YOUR HOME?: NO

## 2025-08-04 ENCOUNTER — RESULTS FOLLOW-UP (OUTPATIENT)
Dept: FAMILY MEDICINE | Age: 70
End: 2025-08-04

## 2025-08-04 ENCOUNTER — LAB SERVICES (OUTPATIENT)
Dept: LAB | Age: 70
End: 2025-08-04

## 2025-08-04 ENCOUNTER — E-ADVICE (OUTPATIENT)
Dept: FAMILY MEDICINE | Age: 70
End: 2025-08-04

## 2025-08-04 ENCOUNTER — APPOINTMENT (OUTPATIENT)
Dept: FAMILY MEDICINE | Age: 70
End: 2025-08-04

## 2025-08-04 VITALS
TEMPERATURE: 97.9 F | BODY MASS INDEX: 32.78 KG/M2 | HEART RATE: 76 BPM | WEIGHT: 204 LBS | HEIGHT: 66 IN | SYSTOLIC BLOOD PRESSURE: 158 MMHG | RESPIRATION RATE: 18 BRPM | DIASTOLIC BLOOD PRESSURE: 86 MMHG

## 2025-08-04 DIAGNOSIS — Z00.00 MEDICARE ANNUAL WELLNESS VISIT, SUBSEQUENT: Primary | ICD-10-CM

## 2025-08-04 DIAGNOSIS — E03.8 OTHER SPECIFIED HYPOTHYROIDISM: ICD-10-CM

## 2025-08-04 DIAGNOSIS — M19.90 ARTHRITIS PAIN: ICD-10-CM

## 2025-08-04 DIAGNOSIS — E03.9 HYPOTHYROIDISM, UNSPECIFIED TYPE: Primary | ICD-10-CM

## 2025-08-04 DIAGNOSIS — E78.5 DYSLIPIDEMIA: ICD-10-CM

## 2025-08-04 RX ORDER — ATORVASTATIN CALCIUM 40 MG/1
40 TABLET, FILM COATED ORAL DAILY
Qty: 90 TABLET | Refills: 3 | Status: SHIPPED | OUTPATIENT
Start: 2025-08-04

## 2025-08-04 RX ORDER — MELOXICAM 15 MG/1
15 TABLET ORAL DAILY
Qty: 30 TABLET | Refills: 1 | Status: SHIPPED | OUTPATIENT
Start: 2025-08-04

## 2025-08-19 ENCOUNTER — APPOINTMENT (OUTPATIENT)
Dept: PODIATRY | Age: 70
End: 2025-08-19

## 2025-08-19 DIAGNOSIS — M20.41 HAMMER TOES OF BOTH FEET: Primary | ICD-10-CM

## 2025-08-19 DIAGNOSIS — M20.42 HAMMER TOES OF BOTH FEET: Primary | ICD-10-CM

## 2025-08-19 DIAGNOSIS — L60.0 INGROWN NAIL: ICD-10-CM

## 2025-08-19 PROCEDURE — 99213 OFFICE O/P EST LOW 20 MIN: CPT | Performed by: PODIATRIST

## 2025-08-29 ASSESSMENT — ENCOUNTER SYMPTOMS
COLOR CHANGE: 1
ACTIVITY CHANGE: 1

## 2025-09-04 ENCOUNTER — LAB SERVICES (OUTPATIENT)
Dept: LAB | Age: 70
End: 2025-09-04

## 2025-09-04 DIAGNOSIS — E03.9 HYPOTHYROIDISM, UNSPECIFIED TYPE: ICD-10-CM

## 2025-09-04 LAB — TSH SERPL-ACNC: 0.53 MCUNITS/ML (ref 0.35–5)

## 2025-10-09 ENCOUNTER — APPOINTMENT (OUTPATIENT)
Dept: MAMMOGRAPHY | Age: 70
End: 2025-10-09
Attending: FAMILY MEDICINE

## 2026-02-16 ENCOUNTER — APPOINTMENT (OUTPATIENT)
Dept: PODIATRY | Age: 71
End: 2026-02-16

## 2026-04-27 ENCOUNTER — APPOINTMENT (OUTPATIENT)
Dept: OPHTHALMOLOGY | Age: 71
End: 2026-04-27

## 2026-04-27 ENCOUNTER — APPOINTMENT (OUTPATIENT)
Dept: OPTOMETRY | Age: 71
End: 2026-04-27

## 2026-07-09 ENCOUNTER — APPOINTMENT (OUTPATIENT)
Dept: CARDIOLOGY | Age: 71
End: 2026-07-09

## (undated) DEVICE — SOL WATER IRRIG 1000ML BOTTLE 07139-09

## (undated) DEVICE — GLOVE PROTEGRITY 7.5 LATEX

## (undated) DEVICE — SOL NACL 0.9% IRRIG 1000ML BOTTLE 07138-09

## (undated) DEVICE — Device

## (undated) RX ORDER — FENTANYL CITRATE 50 UG/ML
INJECTION, SOLUTION INTRAMUSCULAR; INTRAVENOUS
Status: DISPENSED
Start: 2021-10-21

## (undated) RX ORDER — FENTANYL CITRATE 50 UG/ML
INJECTION, SOLUTION INTRAMUSCULAR; INTRAVENOUS
Status: DISPENSED
Start: 2021-10-07